# Patient Record
Sex: FEMALE | Race: WHITE | NOT HISPANIC OR LATINO | Employment: FULL TIME | URBAN - NONMETROPOLITAN AREA
[De-identification: names, ages, dates, MRNs, and addresses within clinical notes are randomized per-mention and may not be internally consistent; named-entity substitution may affect disease eponyms.]

---

## 2021-07-19 ENCOUNTER — OFFICE VISIT (OUTPATIENT)
Dept: URGENT CARE | Facility: PHYSICIAN GROUP | Age: 61
End: 2021-07-19
Payer: COMMERCIAL

## 2021-07-19 VITALS
HEIGHT: 63 IN | DIASTOLIC BLOOD PRESSURE: 94 MMHG | BODY MASS INDEX: 27.46 KG/M2 | HEART RATE: 86 BPM | OXYGEN SATURATION: 99 % | WEIGHT: 155 LBS | RESPIRATION RATE: 14 BRPM | SYSTOLIC BLOOD PRESSURE: 136 MMHG | TEMPERATURE: 98.5 F

## 2021-07-19 DIAGNOSIS — E03.9 HYPOTHYROIDISM, UNSPECIFIED TYPE: ICD-10-CM

## 2021-07-19 DIAGNOSIS — R53.83 FATIGUE, UNSPECIFIED TYPE: ICD-10-CM

## 2021-07-19 PROCEDURE — 99204 OFFICE O/P NEW MOD 45 MIN: CPT | Performed by: PHYSICIAN ASSISTANT

## 2021-07-19 RX ORDER — LEVOTHYROXINE SODIUM 112 UG/1
112 TABLET ORAL
COMMUNITY
End: 2021-07-19 | Stop reason: SDUPTHER

## 2021-07-19 RX ORDER — PREDNISONE 10 MG/1
TABLET ORAL
Qty: 15 TABLET | Refills: 0 | Status: SHIPPED | OUTPATIENT
Start: 2021-07-19 | End: 2021-07-24

## 2021-07-19 RX ORDER — LEVOTHYROXINE SODIUM 112 UG/1
112 TABLET ORAL
Qty: 90 TABLET | Refills: 0 | Status: SHIPPED | OUTPATIENT
Start: 2021-07-19 | End: 2021-08-02 | Stop reason: SDUPTHER

## 2021-07-19 NOTE — PROGRESS NOTES
Chief Complaint   Patient presents with   • Rash     itchy, has tried at home alternatives with no luck, arms   • Medication Refill     synthroid, pt is taking a hormonal pill but cannot remember the name       HISTORY OF PRESENT ILLNESS: Patient is a 60 y.o. female who presents today for the following:    Patient reports a rash on both arms for starting few days ago  It started immediately after pulling weeds  She has a rash on the chest and lower legs as well but not as bad as 8 hours  She complains of itching, denying pain  Over-the-counter medication has not helped    She needs a refill of her thyroid medicine  She has not had labs done since [facility]  Her primary care provider is hard to get into    There are no problems to display for this patient.      Allergies:Codeine and Pcn [penicillins]    Current Outpatient Medications Ordered in Epic   Medication Sig Dispense Refill   • predniSONE (DELTASONE) 10 MG Tab 50 mg x 1 day; 40 mg x 1 day; 30 mg x 1 day; 20 mg x 1 day; 10 mg x 1 day 15 tablet 0   • levothyroxine (SYNTHROID) 112 MCG Tab Take 1 tablet by mouth every morning on an empty stomach. 90 tablet 0     No current Epic-ordered facility-administered medications on file.       History reviewed. No pertinent past medical history.    Social History     Tobacco Use   • Smoking status: Current Every Day Smoker   • Smokeless tobacco: Never Used   Substance Use Topics   • Alcohol use: Never   • Drug use: Never       No family status information on file.   History reviewed. No pertinent family history.    Review of Systems:    Constitutional ROS: No unexpected change in weight, No weakness, No fatigue  Pulmonary ROS: No chronic cough, sputum, or hemoptysis, No dyspnea on exertion, No wheezing  Cardiovascular ROS: No diaphoresis, No edema, No palpitations  Hematologic/Lymphatic ROS: No chills, No night sweats, No weight loss  Skin/Integumentary ROS: Positive for rash    Exam:  /94   Pulse 86   Temp 36.9 °C  "(98.5 °F)   Resp 14   Ht 1.6 m (5' 3\")   Wt 70.3 kg (155 lb)   SpO2 99%   General: Well developed, well nourished. No distress.    HENT: Head is grossly normal.  Pulmonary: Unlabored respiratory effort.   Neurologic: Grossly nonfocal. No facial asymmetry noted.  Skin: Scattered, raised erythematous lesions noted on both arms with excoriation marks diffusely.  Similar rash is noted on the chest on the lower legs.  Psych: Normal mood. Alert and oriented to person, place and time.    Assessment/Plan:  Use all medication as directed.  Patient to have labs drawn and will establish with primary care here in [place]. Encouraged patient to sign up for [facility]. Sign up information was sent via text message.  1. Hypothyroidism, unspecified type  levothyroxine (SYNTHROID) 112 MCG Tab    TSH WITH REFLEX TO FT4    ANTITHYROGLOBULIN AB    THYROID PEROX AB TPO (REFLEX)   2. Fatigue, unspecified type  predniSONE (DELTASONE) 10 MG Tab    CBC WITH DIFFERENTIAL    Comp Metabolic Panel    Lipid Profile    VITAMIN D,25 HYDROXY       "

## 2021-07-24 ENCOUNTER — HOSPITAL ENCOUNTER (OUTPATIENT)
Dept: LAB | Facility: MEDICAL CENTER | Age: 61
End: 2021-07-24
Attending: PHYSICIAN ASSISTANT
Payer: COMMERCIAL

## 2021-07-24 ENCOUNTER — OFFICE VISIT (OUTPATIENT)
Dept: URGENT CARE | Facility: PHYSICIAN GROUP | Age: 61
End: 2021-07-24
Payer: COMMERCIAL

## 2021-07-24 VITALS
RESPIRATION RATE: 16 BRPM | HEART RATE: 64 BPM | SYSTOLIC BLOOD PRESSURE: 116 MMHG | OXYGEN SATURATION: 98 % | HEIGHT: 63 IN | DIASTOLIC BLOOD PRESSURE: 84 MMHG | BODY MASS INDEX: 27.11 KG/M2 | WEIGHT: 153 LBS | TEMPERATURE: 98.4 F

## 2021-07-24 DIAGNOSIS — R53.83 FATIGUE, UNSPECIFIED TYPE: ICD-10-CM

## 2021-07-24 DIAGNOSIS — L23.9 ALLERGIC DERMATITIS: Primary | ICD-10-CM

## 2021-07-24 DIAGNOSIS — E03.9 HYPOTHYROIDISM, UNSPECIFIED TYPE: ICD-10-CM

## 2021-07-24 LAB
25(OH)D3 SERPL-MCNC: 17 NG/ML (ref 30–100)
ALBUMIN SERPL BCP-MCNC: 4.3 G/DL (ref 3.2–4.9)
ALBUMIN/GLOB SERPL: 1.6 G/DL
ALP SERPL-CCNC: 68 U/L (ref 30–99)
ALT SERPL-CCNC: 16 U/L (ref 2–50)
ANION GAP SERPL CALC-SCNC: 12 MMOL/L (ref 7–16)
AST SERPL-CCNC: 12 U/L (ref 12–45)
BASOPHILS # BLD AUTO: 0.9 % (ref 0–1.8)
BASOPHILS # BLD: 0.13 K/UL (ref 0–0.12)
BILIRUB SERPL-MCNC: 0.4 MG/DL (ref 0.1–1.5)
BUN SERPL-MCNC: 14 MG/DL (ref 8–22)
CALCIUM SERPL-MCNC: 9 MG/DL (ref 8.5–10.5)
CHLORIDE SERPL-SCNC: 104 MMOL/L (ref 96–112)
CHOLEST SERPL-MCNC: 180 MG/DL (ref 100–199)
CO2 SERPL-SCNC: 24 MMOL/L (ref 20–33)
CREAT SERPL-MCNC: 0.92 MG/DL (ref 0.5–1.4)
EOSINOPHIL # BLD AUTO: 0.87 K/UL (ref 0–0.51)
EOSINOPHIL NFR BLD: 5.9 % (ref 0–6.9)
ERYTHROCYTE [DISTWIDTH] IN BLOOD BY AUTOMATED COUNT: 47.6 FL (ref 35.9–50)
FASTING STATUS PATIENT QL REPORTED: NORMAL
GLOBULIN SER CALC-MCNC: 2.7 G/DL (ref 1.9–3.5)
GLUCOSE SERPL-MCNC: 83 MG/DL (ref 65–99)
HCT VFR BLD AUTO: 48.1 % (ref 37–47)
HDLC SERPL-MCNC: 76 MG/DL
HGB BLD-MCNC: 15.5 G/DL (ref 12–16)
IMM GRANULOCYTES # BLD AUTO: 0.05 K/UL (ref 0–0.11)
IMM GRANULOCYTES NFR BLD AUTO: 0.3 % (ref 0–0.9)
LDLC SERPL CALC-MCNC: 85 MG/DL
LYMPHOCYTES # BLD AUTO: 3.68 K/UL (ref 1–4.8)
LYMPHOCYTES NFR BLD: 25 % (ref 22–41)
MCH RBC QN AUTO: 31.9 PG (ref 27–33)
MCHC RBC AUTO-ENTMCNC: 32.2 G/DL (ref 33.6–35)
MCV RBC AUTO: 99 FL (ref 81.4–97.8)
MONOCYTES # BLD AUTO: 0.82 K/UL (ref 0–0.85)
MONOCYTES NFR BLD AUTO: 5.6 % (ref 0–13.4)
NEUTROPHILS # BLD AUTO: 9.15 K/UL (ref 2–7.15)
NEUTROPHILS NFR BLD: 62.3 % (ref 44–72)
NRBC # BLD AUTO: 0 K/UL
NRBC BLD-RTO: 0 /100 WBC
PLATELET # BLD AUTO: 309 K/UL (ref 164–446)
PMV BLD AUTO: 10.7 FL (ref 9–12.9)
POTASSIUM SERPL-SCNC: 4.1 MMOL/L (ref 3.6–5.5)
PROT SERPL-MCNC: 7 G/DL (ref 6–8.2)
RBC # BLD AUTO: 4.86 M/UL (ref 4.2–5.4)
SODIUM SERPL-SCNC: 140 MMOL/L (ref 135–145)
TRIGL SERPL-MCNC: 96 MG/DL (ref 0–149)
TSH SERPL DL<=0.005 MIU/L-ACNC: 0.63 UIU/ML (ref 0.38–5.33)
WBC # BLD AUTO: 14.7 K/UL (ref 4.8–10.8)

## 2021-07-24 PROCEDURE — 85025 COMPLETE CBC W/AUTO DIFF WBC: CPT

## 2021-07-24 PROCEDURE — 80053 COMPREHEN METABOLIC PANEL: CPT

## 2021-07-24 PROCEDURE — 80061 LIPID PANEL: CPT

## 2021-07-24 PROCEDURE — 82306 VITAMIN D 25 HYDROXY: CPT

## 2021-07-24 PROCEDURE — 36415 COLL VENOUS BLD VENIPUNCTURE: CPT

## 2021-07-24 PROCEDURE — 99214 OFFICE O/P EST MOD 30 MIN: CPT | Performed by: PHYSICIAN ASSISTANT

## 2021-07-24 PROCEDURE — 84443 ASSAY THYROID STIM HORMONE: CPT

## 2021-07-24 PROCEDURE — 86800 THYROGLOBULIN ANTIBODY: CPT

## 2021-07-24 PROCEDURE — 86376 MICROSOMAL ANTIBODY EACH: CPT

## 2021-07-24 RX ORDER — ESTRADIOL AND NORETHINDRONE ACETATE 1; .5 MG/1; MG/1
1 TABLET ORAL
COMMUNITY
Start: 2021-06-25 | End: 2022-11-21 | Stop reason: SDUPTHER

## 2021-07-24 RX ORDER — CLOBETASOL PROPIONATE 0.5 MG/G
1 CREAM TOPICAL 2 TIMES DAILY
Qty: 60 G | Refills: 0 | Status: SHIPPED | OUTPATIENT
Start: 2021-07-24 | End: 2021-08-03

## 2021-07-24 NOTE — PROGRESS NOTES
"Subjective:   Diane Aguilar is a 60 y.o. female who presents for Itching (x1week, had an allergic reaction )        HPI   The patient was initially seen on 7/19/2021 treated for allergic dermatitis with a 5-day prednisone taper.  She completed full course of medication without improvement.  Symptoms began shortly after pulling weeds in her garden.  There was no new food, products, medications.  She has tried over-the-counter Benadryl cream, cortisone 10 without relief.  She continues to have redness, itching and swelling.      PMH:  has no past medical history on file.  MEDS:   Current Outpatient Medications:   •  estradiol-norethindrone (ACTIVELLA) 1-0.5 MG per tablet, Take 1 tablet by mouth every day., Disp: , Rfl:   •  clobetasol (TEMOVATE) 0.05 % Cream, Apply 1 Application topically 2 times a day for 10 days., Disp: 60 g, Rfl: 0  •  levothyroxine (SYNTHROID) 112 MCG Tab, Take 1 tablet by mouth every morning on an empty stomach., Disp: 90 tablet, Rfl: 0  ALLERGIES:   Allergies   Allergen Reactions   • Codeine    • Pcn [Penicillins]      SURGHX: No past surgical history on file.  SOCHX:  reports that she has been smoking. She has never used smokeless tobacco. She reports that she does not drink alcohol and does not use drugs.  No family history on file.     Objective:   /84   Pulse 64   Temp 36.9 °C (98.4 °F) (Temporal)   Resp 16   Ht 1.6 m (5' 3\")   Wt 69.4 kg (153 lb)   SpO2 98%   BMI 27.10 kg/m²     Physical Exam  Vitals reviewed.   Constitutional:       General: She is not in acute distress.     Appearance: She is well-developed.   HENT:      Head: Normocephalic and atraumatic.   Neck:      Trachea: No tracheal deviation.   Skin:     General: Skin is warm and dry.      Capillary Refill: Capillary refill takes less than 2 seconds.          Neurological:      Mental Status: She is alert and oriented to person, place, and time.   Psychiatric:         Mood and Affect: Mood normal.         Behavior: " Behavior normal.           Assessment/Plan:     1. Allergic dermatitis  clobetasol (TEMOVATE) 0.05 % Cream     Advised patient to discontinue all suspicious products including ingredients with fragrance, dyes.  Patient can apply Aquaphor for moisturizing. We discussed appropriate use and AEs of prolonged use of steroid.     If symptoms persist she can contact me for referral to allergy.    If symptoms worsen or persist patient can return to clinic for reevaluation. Patient confirmed understanding of information.    Please note that this dictation was created using voice recognition software. I have made every reasonable attempt to correct obvious errors, but I expect that there are errors of grammar and possibly content that I did not discover before finalizing the note.

## 2021-07-26 LAB
THYROGLOB AB SERPL-ACNC: 286.8 IU/ML (ref 0–4)
THYROPEROXIDASE AB SERPL-ACNC: 51.2 IU/ML (ref 0–9)

## 2021-08-02 ENCOUNTER — OFFICE VISIT (OUTPATIENT)
Dept: MEDICAL GROUP | Facility: PHYSICIAN GROUP | Age: 61
End: 2021-08-02
Payer: COMMERCIAL

## 2021-08-02 VITALS
HEART RATE: 85 BPM | OXYGEN SATURATION: 97 % | WEIGHT: 155.4 LBS | TEMPERATURE: 98.6 F | DIASTOLIC BLOOD PRESSURE: 84 MMHG | SYSTOLIC BLOOD PRESSURE: 136 MMHG | BODY MASS INDEX: 27.54 KG/M2 | HEIGHT: 63 IN

## 2021-08-02 DIAGNOSIS — Z76.89 ENCOUNTER TO ESTABLISH CARE: ICD-10-CM

## 2021-08-02 DIAGNOSIS — E03.9 HYPOTHYROIDISM, UNSPECIFIED TYPE: ICD-10-CM

## 2021-08-02 DIAGNOSIS — Z78.0 POSTMENOPAUSAL: ICD-10-CM

## 2021-08-02 DIAGNOSIS — F17.210 TOBACCO DEPENDENCE DUE TO CIGARETTES: ICD-10-CM

## 2021-08-02 DIAGNOSIS — E06.3 HASHIMOTO'S THYROIDITIS: ICD-10-CM

## 2021-08-02 DIAGNOSIS — E55.9 VITAMIN D DEFICIENCY: ICD-10-CM

## 2021-08-02 PROCEDURE — 99214 OFFICE O/P EST MOD 30 MIN: CPT | Mod: 25 | Performed by: NURSE PRACTITIONER

## 2021-08-02 PROCEDURE — 99406 BEHAV CHNG SMOKING 3-10 MIN: CPT | Performed by: NURSE PRACTITIONER

## 2021-08-02 RX ORDER — LEVOTHYROXINE SODIUM 112 UG/1
112 TABLET ORAL
Qty: 90 TABLET | Refills: 3 | Status: SHIPPED | OUTPATIENT
Start: 2021-08-02 | End: 2022-10-19 | Stop reason: SDUPTHER

## 2021-08-02 ASSESSMENT — FIBROSIS 4 INDEX: FIB4 SCORE: 0.57

## 2021-08-02 ASSESSMENT — PATIENT HEALTH QUESTIONNAIRE - PHQ9: CLINICAL INTERPRETATION OF PHQ2 SCORE: 0

## 2021-08-02 NOTE — ASSESSMENT & PLAN NOTE
Labwork reviewed and up to date. Taking medicine as directed. Levothyroxine 112 mcg daily.   Denies palpitations, skin changes, temperature intolerance, changes in bowel habits.

## 2021-08-02 NOTE — PROGRESS NOTES
Chief Complaint   Patient presents with   • Establish Care       Subjective:     HPI:   Diane Aguilar is a 60 y.o. female here to discuss the evaluation and management of:        Hashimoto's thyroiditis  Labwork reviewed and up to date. Taking medicine as directed. Levothyroxine 112 mcg daily.   Denies palpitations, skin changes, temperature intolerance, changes in bowel habits.      Postmenopausal  Patient reports that she is postmenopausal.  She is currently taking estradiol-norethindrone daily to help manage symptoms.  She reports that she is up-to-date on her mammogram-we will be requesting records.    Discussed with patient that she must have annual mammogram screenings as Estradiol-normethadone can place her at higher risk for breast cancer.  Patient also does smoke cigarettes.     Vitamin D deficiency  This is a new condition.  Discussed needing daily vitamin D supplement with patient.  We will continue to monitor future appointments.          ROS:  Gen: no fevers/chills, no changes in weight  Eyes: no changes in vision  ENT: no sore throat, no hearing loss, no bloody nose  Pulm: no sob, no cough  CV: no chest pain, no palpitations  GI: no nausea/vomiting, no diarrhea  : no dysuria  MSk: no myalgias  Skin: no rash  Neuro: no headaches, no numbness/tingling      Allergies   Allergen Reactions   • Codeine    • Pcn [Penicillins]        Current medicines (including changes today)  Current Outpatient Medications   Medication Sig Dispense Refill   • levothyroxine (SYNTHROID) 112 MCG Tab Take 1 tablet by mouth every morning on an empty stomach. 90 tablet 3   • estradiol-norethindrone (ACTIVELLA) 1-0.5 MG per tablet Take 1 tablet by mouth every day.     • clobetasol (TEMOVATE) 0.05 % Cream Apply 1 Application topically 2 times a day for 10 days. 60 g 0     No current facility-administered medications for this visit.       Social History     Tobacco Use   • Smoking status: Current Every Day Smoker     Packs/day: 1.00      Years: 23.00     Pack years: 23.00     Types: Cigarettes   • Smokeless tobacco: Never Used   Vaping Use   • Vaping Use: Never assessed   Substance Use Topics   • Alcohol use: Yes     Comment: occ   • Drug use: Never       Patient Active Problem List    Diagnosis Date Noted   • Hashimoto's thyroiditis 08/02/2021   • Vitamin D deficiency 08/02/2021   • Postmenopausal 08/02/2021       Family History   Problem Relation Age of Onset   • Other Father         ALS   • Lung Disease Paternal Grandmother           Objective:     Hospital Outpatient Visit on 07/24/2021   Component Date Value Ref Range Status   • Microsomal -Tpo- Abs 07/24/2021 51.2* 0.0 - 9.0 IU/mL Final    Comment: Performed By: [facility]  [address] [place] [address]  : Susan Brown MD     • Anti-Thyroglobulin 07/24/2021 286.8* 0.0 - 4.0 IU/mL Final    Comment: INTERPRETIVE INFORMATION: Thyroglobulin Antibody    A value of 4.0 IU/mL or less indicates a negative result for  thyroglobulin antibodies.  The Thyroglobulin Antibody assay is being performed using the  [employer] DxI method.  Performed By: [facility]  [address] [id number]  : Susan Brown MD     • TSH 07/24/2021 0.630  0.380 - 5.330 uIU/mL Final    Comment: Please note new reference ranges effective 12/14/2017 10:00 AM    Pregnant Females, 1st Trimester  0.050-3.700  Pregnant Females, 2nd Trimester  0.310-4.350  Pregnant Females, 3rd Trimester  0.410-5.180     • 25-Hydroxy   Vitamin D 25 07/24/2021 17* 30 - 100 ng/mL Final    Comment: Adult Ranges:   <20 ng/mL - Deficiency  20-29 ng/mL - Insufficiency   ng/mL - Sufficiency  Effective 3/18/2020, this electrochemiluminescence binding assay is  performed using Roche leonides e immunoassay analyzer.  The [employer] Vitamin D  total II assay is intended for the quantitative determination of total 25  hydroxyvitamin D in human serum and plasma.  This assay is to be used as an  aid in the assessment of vitamin D sufficiency in adults.     • Cholesterol,Tot 07/24/2021 180  100 - 199 mg/dL Final   • Triglycerides 07/24/2021 96  0 - 149 mg/dL Final   • HDL 07/24/2021 76  >=40 mg/dL Final   • LDL 07/24/2021 85  <100 mg/dL Final   • Sodium 07/24/2021 140  135 - 145 mmol/L Final   • Potassium 07/24/2021 4.1  3.6 - 5.5 mmol/L Final   • Chloride 07/24/2021 104  96 - 112 mmol/L Final   • Co2 07/24/2021 24  20 - 33 mmol/L Final   • Anion Gap 07/24/2021 12.0  7.0 - 16.0 Final   • Glucose 07/24/2021 83  65 - 99 mg/dL Final   • Bun 07/24/2021 14  8 - 22 mg/dL Final   • Creatinine 07/24/2021 0.92  0.50 - 1.40 mg/dL Final   • Calcium 07/24/2021 9.0  8.5 - 10.5 mg/dL Final   • AST(SGOT) 07/24/2021 12  12 - 45 U/L Final   • ALT(SGPT) 07/24/2021 16  2 - 50 U/L Final   • Alkaline Phosphatase 07/24/2021 68  30 - 99 U/L Final   • Total Bilirubin 07/24/2021 0.4  0.1 - 1.5 mg/dL Final   • Albumin 07/24/2021 4.3  3.2 - 4.9 g/dL Final   • Total Protein 07/24/2021 7.0  6.0 - 8.2 g/dL Final   • Globulin 07/24/2021 2.7  1.9 - 3.5 g/dL Final   • A-G Ratio 07/24/2021 1.6  g/dL Final   • WBC 07/24/2021 14.7* 4.8 - 10.8 K/uL Final   • RBC 07/24/2021 4.86  4.20 - 5.40 M/uL Final   • Hemoglobin 07/24/2021 15.5  12.0 - 16.0 g/dL Final   • Hematocrit 07/24/2021 48.1* 37.0 - 47.0 % Final   • MCV 07/24/2021 99.0* 81.4 - 97.8 fL Final   • MCH 07/24/2021 31.9  27.0 - 33.0 pg Final   • MCHC 07/24/2021 32.2* 33.6 - 35.0 g/dL Final   • RDW 07/24/2021 47.6  35.9 - 50.0 fL Final   • Platelet Count 07/24/2021 309  164 - 446 K/uL Final   • MPV 07/24/2021 10.7  9.0 - 12.9 fL Final   • Neutrophils-Polys 07/24/2021 62.30  44.00 - 72.00 % Final   • Lymphocytes 07/24/2021 25.00  22.00 - 41.00 % Final   • Monocytes 07/24/2021 5.60  0.00 - 13.40 % Final   • Eosinophils 07/24/2021 5.90  0.00 - 6.90 % Final   • Basophils 07/24/2021 0.90  0.00 - 1.80 % Final   • Immature Granulocytes 07/24/2021 0.30  0.00 - 0.90 %  "Final   • Nucleated RBC 07/24/2021 0.00  /100 WBC Final   • Neutrophils (Absolute) 07/24/2021 9.15* 2.00 - 7.15 K/uL Final    Includes immature neutrophils, if present.   • Lymphs (Absolute) 07/24/2021 3.68  1.00 - 4.80 K/uL Final   • Monos (Absolute) 07/24/2021 0.82  0.00 - 0.85 K/uL Final   • Eos (Absolute) 07/24/2021 0.87* 0.00 - 0.51 K/uL Final   • Baso (Absolute) 07/24/2021 0.13* 0.00 - 0.12 K/uL Final   • Immature Granulocytes (abs) 07/24/2021 0.05  0.00 - 0.11 K/uL Final   • NRBC (Absolute) 07/24/2021 0.00  K/uL Final   • Fasting Status 07/24/2021 Fasting   Final   • GFR If  07/24/2021 >60  >60 mL/min/1.73 m 2 Final   • GFR If Non  07/24/2021 >60  >60 mL/min/1.73 m 2 Final       /84   Pulse 85   Temp 37 °C (98.6 °F) (Temporal)   Ht 1.6 m (5' 3\")   Wt 70.5 kg (155 lb 6.4 oz)   SpO2 97%  Body mass index is 27.53 kg/m².    Physical Exam:  Constitutional: Well-developed and well-nourished female in NAD. Not diaphoretic. No distress.   Skin: warm, dry, intact, no evidence of rash or concerning lesions  Head: Atraumatic without lesions.  Eyes: Conjunctivae and extraocular motions are normal. Pupils are equal, round, and reactive to light. No scleral icterus.   Ears:  External ears unremarkable. TMs normal; bilaterally  Mouth/Throat: Tongue normal. Oropharynx is clear and moist. Posterior pharynx without erythema or exudates.  Neck: Supple, trachea midline. No thyromegaly present. No cervical or supraclavicular lymphadenopathy.  Cardiovascular: Regular rate and rhythm without murmur. Radial pulses are intact and equal bilaterally.  Pulmonary: Clear to ausculation. Normal effort. No rales, ronchi, or wheezing.  Extremities: No cyanosis, clubbing, erythema, nor edema.   Neurological: Alert and oriented x 3. No cranial nerve deficit. 5/5 myotomes. Sensation intact.   Psychiatric:  Behavior, mood, and affect are appropriate.       Assessment and Plan:     The following " treatment plan was discussed:    1. Encounter to establish care  This is a very pleasant 60-year-old female presents today to establish care.  I have reviewed and updated her medical history, family history, surgical history, allergies, medications, and social determinants of health.       2. Hashimoto's thyroiditis  Chronic and stable condition.  Refill of levothyroxine 112 mcg daily was sent to pharmacy.  Patient is tolerating this medication well without any side effects.  Recheck labs in approximate 4 months.  - TSH WITH REFLEX TO FT4; Future    3. Hypothyroidism, unspecified type  - levothyroxine (SYNTHROID) 112 MCG Tab; Take 1 tablet by mouth every morning on an empty stomach.  Dispense: 90 tablet; Refill: 3    4. Vitamin D deficiency  Patient will start taking vitamin D supplement daily as well as 1200 mg calcium.  - VITAMIN D,25 HYDROXY; Future    5. Postmenopausal  Patient is currently taking estradiol/norethindrone and reports that she is tolerating it well.  She reports she is also up-to-date on her mammograms.  Patient also continues to smoke cigarettes.    6.  Chronic tobacco dependency due to cigarettes  Patient was counseled for approximately 4 minutes in regards to smoking sensation as well as risks of smoking cigarettes.  She reports states she does not want to quit smoking cigarettes.  She is aware of these risks and continues to smoke.  We will continue to  at future appointments.    Any change or worsening of signs or symptoms, patient encouraged to follow-up or report to emergency room for further evaluation. Patient verbalizes understanding and agrees.    Follow-Up: Return in about 4 months (around 12/2/2021) for Follow up labs thyroid .      PLEASE NOTE: This dictation was created using voice recognition software. I have made every reasonable attempt to correct obvious errors, but I expect that there are errors of grammar and possibly content that I did not discover before finalizing  the note.

## 2021-08-02 NOTE — ASSESSMENT & PLAN NOTE
Patient does continue to want to smoke cigarettes and reports that she does not want to quit today.  She does understand the risks of continuing to smoke cigarettes.    The 10-year ASCVD risk score (Kajal DAVID Jr., et al., 2013) is: 5.2%    Values used to calculate the score:      Age: 60 years      Sex: Female      Is Non- : No      Diabetic: No      Tobacco smoker: Yes      Systolic Blood Pressure: 136 mmHg      Is BP treated: No      HDL Cholesterol: 76 mg/dL      Total Cholesterol: 180 mg/dL

## 2021-08-02 NOTE — ASSESSMENT & PLAN NOTE
This is a new condition.  Discussed needing daily vitamin D supplement with patient.  We will continue to monitor future appointments.

## 2021-08-02 NOTE — PATIENT INSTRUCTIONS
"· Vit D 2,000 IU daily and 1,200mg Walter daily.      Smoking Cessation, Tips for Success  If you are ready to quit smoking, congratulations! You have chosen to help yourself be healthier. Cigarettes bring nicotine, tar, carbon monoxide, and other irritants into your body. Your lungs, heart, and blood vessels will be able to work better without these poisons. There are many different ways to quit smoking. Nicotine gum, nicotine patches, a nicotine inhaler, or nicotine nasal spray can help with physical craving. Hypnosis, support groups, and medicines help break the habit of smoking.  WHAT THINGS CAN I DO TO MAKE QUITTING EASIER?   Here are some tips to help you quit for good:  · Pick a date when you will quit smoking completely. Tell all of your friends and family about your plan to quit on that date.  · Do not try to slowly cut down on the number of cigarettes you are smoking. Pick a quit date and quit smoking completely starting on that day.  · Throw away all cigarettes.    · Clean and remove all ashtrays from your home, work, and car.  · On a card, write down your reasons for quitting. Carry the card with you and read it when you get the urge to smoke.  · Cleanse your body of nicotine. Drink enough water and fluids to keep your urine clear or pale yellow. Do this after quitting to flush the nicotine from your body.  · Learn to predict your moods. Do not let a bad situation be your excuse to have a cigarette. Some situations in your life might tempt you into wanting a cigarette.  · Never have \"just one\" cigarette. It leads to wanting another and another. Remind yourself of your decision to quit.  · Change habits associated with smoking. If you smoked while driving or when feeling stressed, try other activities to replace smoking. Stand up when drinking your coffee. Brush your teeth after eating. Sit in a different chair when you read the paper. Avoid alcohol while trying to quit, and try to drink fewer caffeinated " "beverages. Alcohol and caffeine may urge you to smoke.  · Avoid foods and drinks that can trigger a desire to smoke, such as sugary or spicy foods and alcohol.  · Ask people who smoke not to smoke around you.  · Have something planned to do right after eating or having a cup of coffee. For example, plan to take a walk or exercise.  · Try a relaxation exercise to calm you down and decrease your stress. Remember, you may be tense and nervous for the first 2 weeks after you quit, but this will pass.  · Find new activities to keep your hands busy. Play with a pen, coin, or rubber band. Doodle or draw things on paper.  · Brush your teeth right after eating. This will help cut down on the craving for the taste of tobacco after meals. You can also try mouthwash.    · Use oral substitutes in place of cigarettes. Try using lemon drops, carrots, cinnamon sticks, or chewing gum. Keep them handy so they are available when you have the urge to smoke.  · When you have the urge to smoke, try deep breathing.  · Designate your home as a nonsmoking area.  · If you are a heavy smoker, ask your health care provider about a prescription for nicotine chewing gum. It can ease your withdrawal from nicotine.  · Reward yourself. Set aside the cigarette money you save and buy yourself something nice.  · Look for support from others. Join a support group or smoking cessation program. Ask someone at home or at work to help you with your plan to quit smoking.  · Always ask yourself, \"Do I need this cigarette or is this just a reflex?\" Tell yourself, \"Today, I choose not to smoke,\" or \"I do not want to smoke.\" You are reminding yourself of your decision to quit.  · Do not replace cigarette smoking with electronic cigarettes (commonly called e-cigarettes). The safety of e-cigarettes is unknown, and some may contain harmful chemicals.  · If you relapse, do not give up! Plan ahead and think about what you will do the next time you get the urge to " smoke.  HOW WILL I FEEL WHEN I QUIT SMOKING?  You may have symptoms of withdrawal because your body is used to nicotine (the addictive substance in cigarettes). You may crave cigarettes, be irritable, feel very hungry, cough often, get headaches, or have difficulty concentrating. The withdrawal symptoms are only temporary. They are strongest when you first quit but will go away within 10-14 days. When withdrawal symptoms occur, stay in control. Think about your reasons for quitting. Remind yourself that these are signs that your body is healing and getting used to being without cigarettes. Remember that withdrawal symptoms are easier to treat than the major diseases that smoking can cause.   Even after the withdrawal is over, expect periodic urges to smoke. However, these cravings are generally short lived and will go away whether you smoke or not. Do not smoke!  WHAT RESOURCES ARE AVAILABLE TO HELP ME QUIT SMOKING?  Your health care provider can direct you to community resources or hospitals for support, which may include:  · Group support.  · Education.  · Hypnosis.  · Therapy.     This information is not intended to replace advice given to you by your health care provider. Make sure you discuss any questions you have with your health care provider.     Document Released: 09/15/2005 Document Revised: 01/08/2016 Document Reviewed: 06/05/2014  [employer] Interactive Patient Education ©2016 [employer]      MyPlate from [employer]    MyPlate is an outline of a general healthy diet based on the 2010 Dietary Guidelines for Americans, from the U.S. Department of Agriculture (USDA). It sets guidelines for how much food you should eat from each food group based on your age, sex, and level of physical activity.  What are tips for following MyPlate?  To follow MyPlate recommendations:  · Eat a wide variety of fruits and vegetables, grains, and protein foods.  · Serve smaller portions and eat less food throughout the day.  · Limit  portion sizes to avoid overeating.  · Enjoy your food.  · Get at least 150 minutes of exercise every week. This is about 30 minutes each day, 5 or more days per week.  It can be difficult to have every meal look like MyPlate. Think about MyPlate as eating guidelines for an entire day, rather than each individual meal.  Fruits and vegetables  · Make half of your plate fruits and vegetables.  · Eat many different colors of fruits and vegetables each day.  · For a 2,000 calorie daily food plan, eat:  ? 2½ cups of vegetables every day.  ? 2 cups of fruit every day.  · 1 cup is equal to:  ? 1 cup raw or cooked vegetables.  ? 1 cup raw fruit.  ? 1 medium-sized orange, apple, or banana.  ? 1 cup 100% fruit or vegetable juice.  ? 2 cups raw leafy greens, such as lettuce, spinach, or kale.  ? ½ cup dried fruit.  Grains  · One fourth of your plate should be grains.  · Make at least half of the grains you eat each day whole grains.  · For a 2,000 calorie daily food plan, eat 6 oz of grains every day.  · 1 oz is equal to:  ? 1 slice bread.  ? 1 cup cereal.  ? ½ cup cooked rice, cereal, or pasta.  Protein  · One fourth of your plate should be protein.  · Eat a wide variety of protein foods, including meat, poultry, fish, eggs, beans, nuts, and tofu.  · For a 2,000 calorie daily food plan, eat 5½ oz of protein every day.  · 1 oz is equal to:  ? 1 oz meat, poultry, or fish.  ? ¼ cup cooked beans.  ? 1 egg.  ? ½ oz nuts or seeds.  ? 1 Tbsp peanut butter.  Dairy  · Drink fat-free or low-fat (1%) milk.  · Eat or drink dairy as a side to meals.  · For a 2,000 calorie daily food plan, eat or drink 3 cups of dairy every day.  · 1 cup is equal to:  ? 1 cup milk, yogurt, cottage cheese, or soy milk (soy beverage).  ? 2 oz processed cheese.  ? 1½ oz natural cheese.  Fats, oils, salt, and sugars  · Only small amounts of oils are recommended.  · Avoid foods that are high in calories and low in nutritional value (empty calories), like foods  high in fat or added sugars.  · Choose foods that are low in salt (sodium). Choose foods that have less than 140 milligrams (mg) of sodium per serving.  · Drink water instead of sugary drinks. Drink enough water each day to keep your urine pale yellow.  Where to find support  · Work with your health care provider or a nutrition specialist (dietitian) to develop a customized eating plan that is right for you.  · Download an elizabet (mobile application) to help you track your daily food intake.  Where to find more information  · Go to ChooseMyPlate.gov for more information.  · Learn more and log your daily food intake according to MyPlate using [employer]: www.MyTime.[employer].gov  Summary  · MyPlate is a general guideline for healthy eating from the USDA. It is based on the 2010 Dietary Guidelines for Americans.  · In general, fruits and vegetables should take up ½ of your plate, grains should take up ¼ of your plate, and protein should take up ¼ of your plate.  This information is not intended to replace advice given to you by your health care provider. Make sure you discuss any questions you have with your health care provider.  Document Released: 01/06/2009 Document Revised: 01/19/2019 Document Reviewed: 03/19/2018  Elsevier Patient Education © 2020 [employer] Inc.

## 2021-08-02 NOTE — ASSESSMENT & PLAN NOTE
Patient reports that she is postmenopausal.  She is currently taking estradiol-norethindrone daily to help manage symptoms.  She reports that she is up-to-date on her mammogram-we will be requesting records.    Discussed with patient that she must have annual mammogram screenings as Estradiol-normethadone can place her at higher risk for breast cancer.  Patient also does smoke cigarettes.

## 2021-11-10 ENCOUNTER — OFFICE VISIT (OUTPATIENT)
Dept: URGENT CARE | Facility: PHYSICIAN GROUP | Age: 61
End: 2021-11-10
Payer: COMMERCIAL

## 2021-11-10 VITALS
SYSTOLIC BLOOD PRESSURE: 108 MMHG | HEIGHT: 63 IN | HEART RATE: 80 BPM | DIASTOLIC BLOOD PRESSURE: 64 MMHG | OXYGEN SATURATION: 95 % | RESPIRATION RATE: 16 BRPM | WEIGHT: 159 LBS | BODY MASS INDEX: 28.17 KG/M2 | TEMPERATURE: 97.8 F

## 2021-11-10 DIAGNOSIS — M79.641 HAND PAIN, RIGHT: ICD-10-CM

## 2021-11-10 DIAGNOSIS — M25.532 WRIST PAIN, ACUTE, LEFT: ICD-10-CM

## 2021-11-10 PROCEDURE — 99213 OFFICE O/P EST LOW 20 MIN: CPT | Performed by: PHYSICIAN ASSISTANT

## 2021-11-10 RX ORDER — PREDNISONE 10 MG/1
TABLET ORAL
Qty: 15 TABLET | Refills: 0 | Status: SHIPPED | OUTPATIENT
Start: 2021-11-10 | End: 2021-12-07

## 2021-11-10 RX ORDER — IBUPROFEN 200 MG
200 TABLET ORAL EVERY 6 HOURS PRN
COMMUNITY
End: 2022-03-22

## 2021-11-10 ASSESSMENT — FIBROSIS 4 INDEX: FIB4 SCORE: 0.58

## 2021-11-11 NOTE — PROGRESS NOTES
Chief Complaint   Patient presents with   • Hand Pain     severe pain with movement and swelling-worse in the AM        HISTORY OF PRESENT ILLNESS: Patient is a 61 y.o. female who presents today for the following:    Bilateral hand pain x 1 week  No injury  Right hand swelling along digits 1 and 2  Left wrist pain, dorsal aspect  Ice/ibuprofen seems to help  Denies fever, chills    Patient Active Problem List    Diagnosis Date Noted   • Hashimoto's thyroiditis 2021   • Vitamin D deficiency 2021   • Postmenopausal 2021   • Tobacco dependence due to cigarettes 2021       Allergies:Codeine and Pcn [penicillins]    Current Outpatient Medications Ordered in Epic   Medication Sig Dispense Refill   • ibuprofen (MOTRIN) 200 MG Tab Take 200 mg by mouth every 6 hours as needed.     • VITAMIN D PO Take  by mouth.     • Calcium Carbonate (CALCIUM 500 PO) Take  by mouth.     • predniSONE (DELTASONE) 10 MG Tab 50 mg x 1 day; 40 mg x 1 day; 30 mg x 1 day; 20 mg x 1 day; 10 mg x 1 day 15 Tablet 0   • levothyroxine (SYNTHROID) 112 MCG Tab Take 1 tablet by mouth every morning on an empty stomach. 90 tablet 3   • estradiol-norethindrone (ACTIVELLA) 1-0.5 MG per tablet Take 1 tablet by mouth every day.       No current [facility]-ordered facility-administered medications on file.       Past Medical History:   Diagnosis Date   • Thyroid disease        Social History     Tobacco Use   • Smoking status: Current Every Day Smoker     Packs/day: 1.00     Years: 23.00     Pack years: 23.00     Types: Cigarettes   • Smokeless tobacco: Never Used   Vaping Use   • Vaping Use: Not on file   Substance Use Topics   • Alcohol use: Yes     Comment: occ   • Drug use: Never       Family Status   Relation Name Status   • Mo  Alive   • Fa     • Bro  Alive   • MGMo     • MGFa     • PGMo     • PGFa     • Bro  Alive   • Bro  Alive   • Bro     • Bro       Family History   Problem  "Relation Age of Onset   • Other Father         ALS   • Lung Disease Paternal Grandmother        Review of Systems:    Constitutional ROS: No unexpected change in weight, No weakness, No fatigue  Pulmonary ROS: No chronic cough, sputum, or hemoptysis, No dyspnea on exertion, No wheezing  Cardiovascular ROS: No diaphoresis, No edema, No palpitations  Musculoskeletal/Extremities ROS: bilateral hand pain  Hematologic/Lymphatic ROS: No chills, No night sweats, No weight loss  Skin/Integumentary ROS: No edema, No evidence of rash, No itching    Exam:  /64   Pulse 80   Temp 36.6 °C (97.8 °F)   Resp 16   Ht 1.6 m (5' 3\")   Wt 72.1 kg (159 lb)   SpO2 95%   General: Well developed, well nourished. No distress.    HENT: Head is grossly normal.  Pulmonary: Unlabored respiratory effort.   Neurologic: Grossly nonfocal. No facial asymmetry noted.  Musculoskeletal: Diffuse tenderness is noted on digits 1 and 2 of the right hand with trace edema extending into the thenar aspect.  No erythema or rashes noted in the area of pain.  Tenderness is noted on the dorsal aspect of the left wrist without soft tissue swelling or ecchymosis.  Patient is full range of motion of the left wrist.  Able to make a full fist bilaterally.  Brisk cap refill noted bilaterally.  Skin: Warm, dry, good turgor. No rashes in visible areas.   Psych: Normal mood. Alert and oriented to person, place and time.    Assessment/Plan:  Unknown etiology.  Starting prednisone but have advised follow-up if symptoms do not improve or if they return after stopping steroids.  No signs of infection noted. Follow up for worsening or persistent symptoms.  1. Hand pain, right  predniSONE (DELTASONE) 10 MG Tab   2. Wrist pain, acute, left  predniSONE (DELTASONE) 10 MG Tab       "

## 2021-12-07 ENCOUNTER — OFFICE VISIT (OUTPATIENT)
Dept: MEDICAL GROUP | Facility: PHYSICIAN GROUP | Age: 61
End: 2021-12-07
Payer: COMMERCIAL

## 2021-12-07 VITALS
DIASTOLIC BLOOD PRESSURE: 84 MMHG | SYSTOLIC BLOOD PRESSURE: 132 MMHG | HEIGHT: 63 IN | TEMPERATURE: 97.4 F | BODY MASS INDEX: 28.24 KG/M2 | RESPIRATION RATE: 16 BRPM | WEIGHT: 159.4 LBS | HEART RATE: 89 BPM | OXYGEN SATURATION: 99 %

## 2021-12-07 DIAGNOSIS — R21 RASH: ICD-10-CM

## 2021-12-07 PROCEDURE — 99214 OFFICE O/P EST MOD 30 MIN: CPT | Performed by: NURSE PRACTITIONER

## 2021-12-07 RX ORDER — CETIRIZINE HYDROCHLORIDE 10 MG/1
TABLET ORAL
Qty: 30 TABLET | Refills: 2 | Status: SHIPPED | OUTPATIENT
Start: 2021-12-07 | End: 2023-11-22

## 2021-12-07 ASSESSMENT — FIBROSIS 4 INDEX: FIB4 SCORE: 0.58

## 2021-12-08 NOTE — PROGRESS NOTES
Chief Complaint   Patient presents with   • Itching     since september    • Hand Pain     bilateral hurts more on the right x since august        Subjective:     HPI:   Diane Aguilar is a 61 y.o. female here to discuss the evaluation and management of:      Rash  This is a chronic and ongoing condition.  Patient has been seen off-and-on in the urgent care since July 2021 for allergic dermatitis.  She has tried multiple over-the-counter medications including oral Benadryl, Benadryl cream, and cortisone 10.  She has also been prescribed Clobetasol 0.05% and prednisone which has given her some relief however it is only been short-term.  Approximately a week after the steroid treatments her symptoms come back.  She is recently prescribed on 11- prednisone taper.  She reports that back in July the rash appeared and she thinks it was related to when she was weed eating and pulling weeds in her yard however it has never completely gone away.  She denies anyone else in the family with this rash.  She denies any history of anyone diagnosed with scabies.  She denies any new medications.  She denies any change in detergents or fragrances.      ROS  Gen: no fevers/chills, no changes in weight  Pulm: no sob, no cough  CV: no chest pain, no palpitations  GI: no nausea/vomiting, no diarrhea  MSk: no myalgias  Skin: Positive per HPI  Neuro: no headaches, no numbness/tingling      Allergies   Allergen Reactions   • Codeine    • Pcn [Penicillins]        Current medicines (including changes today)  Current Outpatient Medications   Medication Sig Dispense Refill   • cetirizine (ZYRTEC) 10 MG Tab Take 4 tablets at night for 1 to 2 weeks then 1 tablet nightly for itching 30 Tablet 2   • ibuprofen (MOTRIN) 200 MG Tab Take 200 mg by mouth every 6 hours as needed.     • VITAMIN D PO Take  by mouth.     • Calcium Carbonate (CALCIUM 500 PO) Take  by mouth.     • levothyroxine (SYNTHROID) 112 MCG Tab Take 1 tablet by mouth every morning  "on an empty stomach. 90 tablet 3   • estradiol-norethindrone (ACTIVELLA) 1-0.5 MG per tablet Take 1 tablet by mouth every day.       No current facility-administered medications for this visit.       Social History     Tobacco Use   • Smoking status: Current Every Day Smoker     Packs/day: 1.00     Years: 23.00     Pack years: 23.00     Types: Cigarettes   • Smokeless tobacco: Never Used   Vaping Use   • Vaping Use: Never used   Substance Use Topics   • Alcohol use: Yes     Comment: occ   • Drug use: Never       Patient Active Problem List    Diagnosis Date Noted   • Rash 12/07/2021   • Hashimoto's thyroiditis 08/02/2021   • Vitamin D deficiency 08/02/2021   • Postmenopausal 08/02/2021   • Tobacco dependence due to cigarettes 08/02/2021       Family History   Problem Relation Age of Onset   • Other Father         ALS   • Lung Disease Paternal Grandmother           Objective:     /84 (BP Location: Right arm, Patient Position: Sitting, BP Cuff Size: Adult)   Pulse 89   Temp 36.3 °C (97.4 °F) (Temporal)   Resp 16   Ht 1.6 m (5' 3\")   Wt 72.3 kg (159 lb 6.4 oz)   SpO2 99%  Body mass index is 28.24 kg/m².    Physical Exam:  Constitutional: Well-developed and well-nourished female in NAD. Not diaphoretic. No distress.   Skin: Multiple scattered raised lesions on both upper and lower extremities that are erythematous, pruritic in nature, with diffuse excoriations, with mild weeping.  No central clearing, no blisters, no scaling.  Cardiovascular: Regular rate and rhythm without murmur. Radial pulses are intact and equal bilaterally.  Pulmonary: Clear to ausculation. Normal effort. No rales, ronchi, or wheezing.  Abdomen: Soft, non tender, and without distention. Active bowel sounds in all four quadrants. No rebound, guarding, masses   Extremities: No cyanosis, clubbing, erythema, nor edema.   Neurological: Alert and oriented x 3. No cranial nerve deficit. 5/5 myotomes. Sensation intact.   Psychiatric:  Behavior, " mood, and affect are appropriate.       Assessment and Plan:     The following treatment plan was discussed:      1. Rash  Chronic and ongoing condition with exacerbation.  Patient will start taking Zyrtec 4 tablets at night for 1 to 2 weeks then 1 tablet nightly for 3 months.    over-the-counter Sarna with menthol cream and Pepcid.  Ibuprofen 600 mg 2-3 times daily as needed for inflammation.    - cetirizine (ZYRTEC) 10 MG Tab; Take 4 tablets at night for 1 to 2 weeks then 1 tablet nightly for itching  Dispense: 30 Tablet; Refill: 2  - Referral to Dermatology  -Referral to allergist      Any change or worsening of signs or symptoms, patient encouraged to follow-up or report to emergency room for further evaluation. Patient verbalizes understanding and agrees.    Follow-Up: Return in about 7 weeks (around 1/25/2022) for Rash.      PLEASE NOTE: This dictation was created using voice recognition software. I have made every reasonable attempt to correct obvious errors, but I expect that there are errors of grammar and possibly content that I did not discover before finalizing the note.

## 2021-12-08 NOTE — PATIENT INSTRUCTIONS
Taking Zyrtec as prescribed.   over-the-counter Sarna with menthol.  Start taking over-the-counter Pepcid once daily.  You can take over-the-counter ibuprofen 600 mg 2-3 times daily as needed for inflammation from rash.

## 2021-12-08 NOTE — ASSESSMENT & PLAN NOTE
This is a chronic and ongoing condition.  Patient has been seen off-and-on in the urgent care since July 2021 for allergic dermatitis.  She has tried multiple over-the-counter medications including oral Benadryl, Benadryl cream, and cortisone 10.  She has also been prescribed Clobetasol 0.05% and prednisone which has given her some relief however it is only been short-term.  Approximately a week after the steroid treatments her symptoms come back.  She is recently prescribed on 11- prednisone taper.  She reports that back in July the rash appeared and she thinks it was related to when she was weed eating and pulling weeds in her yard however it has never completely gone away.  She denies anyone else in the family with this rash.  She denies any history of anyone diagnosed with scabies.  She denies any new medications.  She denies any change in detergents or fragrances.

## 2022-01-26 ENCOUNTER — OFFICE VISIT (OUTPATIENT)
Dept: MEDICAL GROUP | Facility: PHYSICIAN GROUP | Age: 62
End: 2022-01-26
Payer: COMMERCIAL

## 2022-01-26 VITALS
HEART RATE: 81 BPM | OXYGEN SATURATION: 97 % | HEIGHT: 63 IN | WEIGHT: 163 LBS | BODY MASS INDEX: 28.88 KG/M2 | TEMPERATURE: 97.6 F | DIASTOLIC BLOOD PRESSURE: 80 MMHG | RESPIRATION RATE: 20 BRPM | SYSTOLIC BLOOD PRESSURE: 120 MMHG

## 2022-01-26 DIAGNOSIS — L03.113 CELLULITIS OF RIGHT UPPER EXTREMITY: ICD-10-CM

## 2022-01-26 DIAGNOSIS — R21 RASH: ICD-10-CM

## 2022-01-26 PROCEDURE — 99214 OFFICE O/P EST MOD 30 MIN: CPT | Performed by: NURSE PRACTITIONER

## 2022-01-26 RX ORDER — CLOBETASOL PROPIONATE 0.5 MG/G
1 CREAM TOPICAL 2 TIMES DAILY
Qty: 30 G | Refills: 1 | Status: SHIPPED | OUTPATIENT
Start: 2022-01-26 | End: 2022-08-19

## 2022-01-26 RX ORDER — CLINDAMYCIN HYDROCHLORIDE 300 MG/1
300 CAPSULE ORAL 3 TIMES DAILY
Qty: 21 CAPSULE | Refills: 0 | Status: SHIPPED | OUTPATIENT
Start: 2022-01-26 | End: 2022-02-02

## 2022-01-26 RX ORDER — METHYLPREDNISOLONE 4 MG/1
TABLET ORAL
Qty: 21 TABLET | Refills: 0 | Status: SHIPPED | OUTPATIENT
Start: 2022-01-26 | End: 2022-03-08

## 2022-01-26 RX ORDER — NAPROXEN 250 MG/1
250 TABLET ORAL 2 TIMES DAILY WITH MEALS
COMMUNITY
End: 2022-03-08

## 2022-01-26 ASSESSMENT — FIBROSIS 4 INDEX: FIB4 SCORE: 0.58

## 2022-01-26 ASSESSMENT — PATIENT HEALTH QUESTIONNAIRE - PHQ9: CLINICAL INTERPRETATION OF PHQ2 SCORE: 0

## 2022-01-26 NOTE — ASSESSMENT & PLAN NOTE
Is a chronic ongoing condition.  Patient continues to have a allergic dermatitis rash on bilateral upper arms and lower ankles.  She is currently using Zyrtec, cream with menthol, and Clobetasol 0.05% and has had mild improvement on most areas.  Her right wrist today is red, mildly swollen and warm to the touch.  She also has pain with movement.  She did follow-up with her an allergist and was recommended to change shampoos.  She reports that they are unsure of what would be causing it.  She does have an appointment scheduled with dermatology however is not until March.    Plan:  Continue with Zyrtec, over-the-counter lotion with menthol, and Cobetasol 0.05%.   New prescription of clindamycin was sent in for cellulitis symptoms.  Close follow-up in 1 month.  Encourage patient to keep dermatology appointment.  ER precautions were discussed

## 2022-01-26 NOTE — PROGRESS NOTES
Chief Complaint   Patient presents with   • Rash     hands, arms, itching        Subjective:     HPI:   Diane Aguilar is a 61 y.o. female here to discuss the evaluation and management of:      Rash  Is a chronic ongoing condition.  Patient continues to have a allergic dermatitis rash on bilateral upper arms and lower ankles.  She is currently using Zyrtec, cream with menthol, and Clobetasol 0.05% and has had mild improvement on most areas.  Her right wrist today is red, mildly swollen and warm to the touch.  She also has pain with movement.  She did follow-up with her an allergist and was recommended to change shampoos.  She reports that they are unsure of what would be causing it.  She does have an appointment scheduled with dermatology however is not until March.    Plan:  Continue with Zyrtec, over-the-counter lotion with menthol, and Cobetasol 0.05%.   New prescription of clindamycin was sent in for cellulitis symptoms.  Close follow-up in 1 month.  Encourage patient to keep dermatology appointment.  ER precautions were discussed              ROS:  Gen: no fevers/chills, no changes in weight  Pulm: no sob, no cough  CV: no chest pain, no palpitations  Skin: Per HPI  Neuro: no headaches, no numbness/tingling  Heme/Lymph: no easy bruising    Allergies   Allergen Reactions   • Codeine    • Pcn [Penicillins]        Current medicines (including changes today)  Current Outpatient Medications   Medication Sig Dispense Refill   • naproxen (NAPROSYN) 250 MG Tab Take 250 mg by mouth 2 times a day with meals.     • methylPREDNISolone (MEDROL DOSEPAK) 4 MG Tablet Therapy Pack As directed on the packaging label. 21 Tablet 0   • clindamycin (CLEOCIN) 300 MG Cap Take 1 Capsule by mouth 3 times a day for 7 days. 21 Capsule 0   • clobetasol (TEMOVATE) 0.05 % Cream Apply 1 Application topically 2 times a day. 30 g 1   • cetirizine (ZYRTEC) 10 MG Tab Take 4 tablets at night for 1 to 2 weeks then 1 tablet nightly for itching 30  "Tablet 2   • VITAMIN D PO Take  by mouth.     • Calcium Carbonate (CALCIUM 500 PO) Take  by mouth.     • levothyroxine (SYNTHROID) 112 MCG Tab Take 1 tablet by mouth every morning on an empty stomach. 90 tablet 3   • estradiol-norethindrone (ACTIVELLA) 1-0.5 MG per tablet Take 1 tablet by mouth every day.     • ibuprofen (MOTRIN) 200 MG Tab Take 200 mg by mouth every 6 hours as needed. (Patient not taking: Reported on 1/26/2022)       No current facility-administered medications for this visit.       Social History     Tobacco Use   • Smoking status: Current Every Day Smoker     Packs/day: 1.00     Years: 23.00     Pack years: 23.00     Types: Cigarettes   • Smokeless tobacco: Never Used   Vaping Use   • Vaping Use: Never used   Substance Use Topics   • Alcohol use: Yes     Comment: occ   • Drug use: Never       Patient Active Problem List    Diagnosis Date Noted   • Cellulitis of right upper extremity 01/26/2022   • Rash 12/07/2021   • Hashimoto's thyroiditis 08/02/2021   • Vitamin D deficiency 08/02/2021   • Postmenopausal 08/02/2021   • Tobacco dependence due to cigarettes 08/02/2021       Family History   Problem Relation Age of Onset   • Other Father         ALS   • Lung Disease Paternal Grandmother           Objective:     /80 (BP Location: Right arm, Patient Position: Sitting, BP Cuff Size: Adult)   Pulse 81   Temp 36.4 °C (97.6 °F) (Temporal)   Resp 20   Ht 1.6 m (5' 3\")   Wt 73.9 kg (163 lb)   SpO2 97%  Body mass index is 28.87 kg/m².    Physical Exam:  Constitutional: Well-developed and well-nourished female in NAD. Not diaphoretic. No distress.   Skin :Multiple scattered raised lesions on both upper and lower extremities that are erythematous, pruritic in nature, with diffuse excoriations, No weeping.  No central clearing, no blisters, no scaling.  Head: Atraumatic without lesions.  Eyes: Conjunctivae and extraocular motions are normal. Pupils are equal, round, and reactive to light. No " scleral icterus.   Cardiovascular: Regular rate and rhythm without murmur. Radial pulses are intact and equal bilaterally.  Pulmonary: Clear to ausculation. Normal effort. No rales, ronchi, or wheezing.  Extremities: No cyanosis, clubbing, erythema, nor edema.   Neurological: Alert and oriented x 3. No cranial nerve deficit. 5/5 myotomes. Sensation intact.   Psychiatric:  Behavior, mood, and affect are appropriate.      Assessment and Plan:     The following treatment plan was discussed:      1. Rash  - methylPREDNISolone (MEDROL DOSEPAK) 4 MG Tablet Therapy Pack; As directed on the packaging label.  Dispense: 21 Tablet; Refill: 0  - clindamycin (CLEOCIN) 300 MG Cap; Take 1 Capsule by mouth 3 times a day for 7 days.  Dispense: 21 Capsule; Refill: 0  - clobetasol (TEMOVATE) 0.05 % Cream; Apply 1 Application topically 2 times a day.  Dispense: 30 g; Refill: 1    2. Cellulitis of right upper extremity  - clindamycin (CLEOCIN) 300 MG Cap; Take 1 Capsule by mouth 3 times a day for 7 days.  Dispense: 21 Capsule; Refill: 0    Other orders  - naproxen (NAPROSYN) 250 MG Tab; Take 250 mg by mouth 2 times a day with meals.      Any change or worsening of signs or symptoms, patient encouraged to follow-up or report to emergency room for further evaluation. Patient verbalizes understanding and agrees.    Follow-Up: Return in about 4 weeks (around 2/23/2022) for Rash.      PLEASE NOTE: This dictation was created using voice recognition software. I have made every reasonable attempt to correct obvious errors, but I expect that there are errors of grammar and possibly content that I did not discover before finalizing the note.

## 2022-03-08 ENCOUNTER — OFFICE VISIT (OUTPATIENT)
Dept: MEDICAL GROUP | Facility: PHYSICIAN GROUP | Age: 62
End: 2022-03-08
Payer: COMMERCIAL

## 2022-03-08 ENCOUNTER — HOSPITAL ENCOUNTER (OUTPATIENT)
Dept: LAB | Facility: MEDICAL CENTER | Age: 62
End: 2022-03-08
Attending: NURSE PRACTITIONER
Payer: COMMERCIAL

## 2022-03-08 VITALS
BODY MASS INDEX: 26.23 KG/M2 | WEIGHT: 157.4 LBS | HEIGHT: 65 IN | HEART RATE: 77 BPM | RESPIRATION RATE: 14 BRPM | DIASTOLIC BLOOD PRESSURE: 78 MMHG | OXYGEN SATURATION: 96 % | TEMPERATURE: 97.1 F | SYSTOLIC BLOOD PRESSURE: 118 MMHG

## 2022-03-08 DIAGNOSIS — R21 RASH: ICD-10-CM

## 2022-03-08 DIAGNOSIS — M79.89 SWELLING OF BOTH HANDS: ICD-10-CM

## 2022-03-08 DIAGNOSIS — L03.113 CELLULITIS OF RIGHT UPPER EXTREMITY: ICD-10-CM

## 2022-03-08 LAB — RHEUMATOID FACT SER IA-ACNC: 80 IU/ML (ref 0–14)

## 2022-03-08 PROCEDURE — 36415 COLL VENOUS BLD VENIPUNCTURE: CPT

## 2022-03-08 PROCEDURE — 86431 RHEUMATOID FACTOR QUANT: CPT

## 2022-03-08 PROCEDURE — 86039 ANTINUCLEAR ANTIBODIES (ANA): CPT

## 2022-03-08 PROCEDURE — 99214 OFFICE O/P EST MOD 30 MIN: CPT | Performed by: NURSE PRACTITIONER

## 2022-03-08 PROCEDURE — 86038 ANTINUCLEAR ANTIBODIES: CPT

## 2022-03-08 RX ORDER — MELOXICAM 7.5 MG/1
7.5 TABLET ORAL DAILY
Qty: 30 TABLET | Refills: 1 | Status: SHIPPED | OUTPATIENT
Start: 2022-03-08 | End: 2022-08-19

## 2022-03-08 ASSESSMENT — FIBROSIS 4 INDEX: FIB4 SCORE: 0.58

## 2022-03-08 NOTE — ASSESSMENT & PLAN NOTE
Chronic and ongoing condition.  Patient reports that approximately every 3 months she continues to have this pruritic rash on her hands and lower extremities.  Bilateral upper extremity joints and wrists also become swollen and painful with palpation.  She does continue to use Zyrtec and cream with menthol in it to help with her symptoms.    She did follow-up with dermatology today who started her on Ultravate 0.05% cream for the next 2 weeks.  Patient does have follow-up in 2 weeks with dermatology.    Plan  Patient will continue with Zyrtec and menthol lotion.  Patient will start new prescription as ordered by dermatology.  Encourage patient to keep follow-up with dermatologist.  Discussed with patient running labs to check for autoimmune disorders.  Patient was agreeable to this.  Orders were placed as indicated below.

## 2022-03-08 NOTE — ASSESSMENT & PLAN NOTE
See additional notes under rash.  This is a chronic and ongoing condition.    Plan   labs were ordered as indicated below.  Discussed possible autoimmune etiologies

## 2022-03-08 NOTE — PROGRESS NOTES
Chief Complaint   Patient presents with   • Follow-Up     Rash/ hand swelling       Subjective:     HPI:   Diane Aguilar is a 61 y.o. female here to discuss the evaluation and management of:      Rash  Chronic and ongoing condition.  Patient reports that approximately every 3 months she continues to have this pruritic rash on her hands and lower extremities.  Bilateral upper extremity joints and wrists also become swollen and painful with palpation.  She does continue to use Zyrtec and cream with menthol in it to help with her symptoms.    She did follow-up with dermatology today who started her on Ultravate 0.05% cream for the next 2 weeks.  Patient does have follow-up in 2 weeks with dermatology.    Plan  Patient will continue with Zyrtec and menthol lotion.  Patient will start new prescription as ordered by dermatology.  Encourage patient to keep follow-up with dermatologist.  Discussed with patient running labs to check for autoimmune disorders.  Patient was agreeable to this.  Orders were placed as indicated below.    Cellulitis of right upper extremity  This is resolved.  Patient finished antibiotic treatment and has had improvement.  No cellulitis indicated today during physical exam.    Swelling of both hands  See additional notes under rash.  This is a chronic and ongoing condition.    Plan   labs were ordered as indicated below.  Discussed possible autoimmune etiologies            ROS:  Gen: no fevers/chills, no changes in weight  Pulm: no sob, no cough  CV: no chest pain, no palpitations  GI: no nausea/vomiting, no diarrhea  MSk: Positive per HPI  Skin: Positive HPI  Neuro: no headaches, no numbness/tingling      Allergies   Allergen Reactions   • Codeine    • Pcn [Penicillins]        Current medicines (including changes today)  Current Outpatient Medications   Medication Sig Dispense Refill   • halobetasol (ULTRAVATE) 0.05 % cream Apply  topically 2 times a day. Times 2 weeks     • meloxicam (MOBIC) 7.5 MG  Tab Take 1 Tablet by mouth every day. 30 Tablet 1   • clobetasol (TEMOVATE) 0.05 % Cream Apply 1 Application topically 2 times a day. 30 g 1   • VITAMIN D PO Take  by mouth.     • Calcium Carbonate (CALCIUM 500 PO) Take  by mouth.     • levothyroxine (SYNTHROID) 112 MCG Tab Take 1 tablet by mouth every morning on an empty stomach. 90 tablet 3   • estradiol-norethindrone (ACTIVELLA) 1-0.5 MG per tablet Take 1 tablet by mouth every day.     • cetirizine (ZYRTEC) 10 MG Tab Take 4 tablets at night for 1 to 2 weeks then 1 tablet nightly for itching 30 Tablet 2   • ibuprofen (MOTRIN) 200 MG Tab Take 200 mg by mouth every 6 hours as needed. (Patient not taking: No sig reported)       No current facility-administered medications for this visit.          Objective:     No visits with results within 1 Month(s) from this visit.   Latest known visit with results is:   Hospital Outpatient Visit on 07/24/2021   Component Date Value Ref Range Status   • Microsomal -Tpo- Abs 07/24/2021 51.2 (A) 0.0 - 9.0 IU/mL Final    Comment: Performed By: [facility]  [address] [place], [place] [id number]  : Susan Brown MD     • Anti-Thyroglobulin 07/24/2021 286.8 (A) 0.0 - 4.0 IU/mL Final    Comment: INTERPRETIVE INFORMATION: Thyroglobulin Antibody    A value of 4.0 IU/mL or less indicates a negative result for  thyroglobulin antibodies.  The Thyroglobulin Antibody assay is being performed using the  [employer] Access DxI method.  Performed By: [facility]  [address] [place] [id number]  : Susan Brown MD     • TSH 07/24/2021 0.630  0.380 - 5.330 uIU/mL Final    Comment: Please note new reference ranges effective 12/14/2017 10:00 AM    Pregnant Females, 1st Trimester  0.050-3.700  Pregnant Females, 2nd Trimester  0.310-4.350  Pregnant Females, 3rd Trimester  0.410-5.180     • 25-Hydroxy   Vitamin D 25 07/24/2021 17 (A) 30 - 100 ng/mL Final    Comment: Adult Ranges:    <20 ng/mL - Deficiency  20-29 ng/mL - Insufficiency   ng/mL - Sufficiency  Effective 3/18/2020, this electrochemiluminescence binding assay is  performed using Roche leonides e immunoassay analyzer.  The [employer] Vitamin D  total II assay is intended for the quantitative determination of total 25  hydroxyvitamin D in human serum and plasma. This assay is to be used as an  aid in the assessment of vitamin D sufficiency in adults.     • Cholesterol,Tot 07/24/2021 180  100 - 199 mg/dL Final   • Triglycerides 07/24/2021 96  0 - 149 mg/dL Final   • HDL 07/24/2021 76  >=40 mg/dL Final   • LDL 07/24/2021 85  <100 mg/dL Final   • Sodium 07/24/2021 140  135 - 145 mmol/L Final   • Potassium 07/24/2021 4.1  3.6 - 5.5 mmol/L Final   • Chloride 07/24/2021 104  96 - 112 mmol/L Final   • Co2 07/24/2021 24  20 - 33 mmol/L Final   • Anion Gap 07/24/2021 12.0  7.0 - 16.0 Final   • Glucose 07/24/2021 83  65 - 99 mg/dL Final   • Bun 07/24/2021 14  8 - 22 mg/dL Final   • Creatinine 07/24/2021 0.92  0.50 - 1.40 mg/dL Final   • Calcium 07/24/2021 9.0  8.5 - 10.5 mg/dL Final   • AST(SGOT) 07/24/2021 12  12 - 45 U/L Final   • ALT(SGPT) 07/24/2021 16  2 - 50 U/L Final   • Alkaline Phosphatase 07/24/2021 68  30 - 99 U/L Final   • Total Bilirubin 07/24/2021 0.4  0.1 - 1.5 mg/dL Final   • Albumin 07/24/2021 4.3  3.2 - 4.9 g/dL Final   • Total Protein 07/24/2021 7.0  6.0 - 8.2 g/dL Final   • Globulin 07/24/2021 2.7  1.9 - 3.5 g/dL Final   • A-G Ratio 07/24/2021 1.6  g/dL Final   • WBC 07/24/2021 14.7 (A) 4.8 - 10.8 K/uL Final   • RBC 07/24/2021 4.86  4.20 - 5.40 M/uL Final   • Hemoglobin 07/24/2021 15.5  12.0 - 16.0 g/dL Final   • Hematocrit 07/24/2021 48.1 (A) 37.0 - 47.0 % Final   • MCV 07/24/2021 99.0 (A) 81.4 - 97.8 fL Final   • MCH 07/24/2021 31.9  27.0 - 33.0 pg Final   • MCHC 07/24/2021 32.2 (A) 33.6 - 35.0 g/dL Final   • RDW 07/24/2021 47.6  35.9 - 50.0 fL Final   • Platelet Count 07/24/2021 309  164 - 446 K/uL Final   • MPV  "07/24/2021 10.7  9.0 - 12.9 fL Final   • Neutrophils-Polys 07/24/2021 62.30  44.00 - 72.00 % Final   • Lymphocytes 07/24/2021 25.00  22.00 - 41.00 % Final   • Monocytes 07/24/2021 5.60  0.00 - 13.40 % Final   • Eosinophils 07/24/2021 5.90  0.00 - 6.90 % Final   • Basophils 07/24/2021 0.90  0.00 - 1.80 % Final   • Immature Granulocytes 07/24/2021 0.30  0.00 - 0.90 % Final   • Nucleated RBC 07/24/2021 0.00  /100 WBC Final   • Neutrophils (Absolute) 07/24/2021 9.15 (A) 2.00 - 7.15 K/uL Final    Includes immature neutrophils, if present.   • Lymphs (Absolute) 07/24/2021 3.68  1.00 - 4.80 K/uL Final   • Monos (Absolute) 07/24/2021 0.82  0.00 - 0.85 K/uL Final   • Eos (Absolute) 07/24/2021 0.87 (A) 0.00 - 0.51 K/uL Final   • Baso (Absolute) 07/24/2021 0.13 (A) 0.00 - 0.12 K/uL Final   • Immature Granulocytes (abs) 07/24/2021 0.05  0.00 - 0.11 K/uL Final   • NRBC (Absolute) 07/24/2021 0.00  K/uL Final   • Fasting Status 07/24/2021 Fasting   Final   • GFR If  07/24/2021 >60  >60 mL/min/1.73 m 2 Final   • GFR If Non  07/24/2021 >60  >60 mL/min/1.73 m 2 Final       /78   Pulse 77   Temp 36.2 °C (97.1 °F)   Resp 14   Ht 1.638 m (5' 4.5\")   Wt 71.4 kg (157 lb 6.4 oz)   SpO2 96%  Body mass index is 26.6 kg/m².    Physical Exam:  Constitutional: Well-developed and well-nourished female in NAD. Not diaphoretic. No distress.   Skin: Multiple scattered raised lesions on both upper extremities and hands that are erythemic in nature and pruritic with mild excoriation on her fingers.  There is no weeping, or signs of infection.  Bilateral lower extremities around ankles excoriation is noted.  Biopsies were taken today from dermatology.  Eyes: Conjunctivae and extraocular motions are normal. Pupils are equal, round, and reactive to light. No scleral icterus.   Cardiovascular: Regular rate and rhythm without murmur. Radial pulses are intact and equal bilaterally.  Pulmonary: Clear to " ausculation. Normal effort. No rales, ronchi, or wheezing.   Extremities: No cyanosis, clubbing, erythema, nor edema.   Neurological: Alert and oriented x 3.   Psychiatric:  Behavior, mood, and affect are appropriate.    Assessment and Plan:     The following treatment plan was discussed:    1. Swelling of both hands  - ANTI-NUCLEAR ANTIBODY SERUM; Future  - RHEUMATOID ARTHRITIS FACTOR; Future  - meloxicam (MOBIC) 7.5 MG Tab; Take 1 Tablet by mouth every day.  Dispense: 30 Tablet; Refill: 1  - T.PALLIDUM AB EIA; Future    2. Rash  - ANTI-NUCLEAR ANTIBODY SERUM; Future  - RHEUMATOID ARTHRITIS FACTOR; Future  - meloxicam (MOBIC) 7.5 MG Tab; Take 1 Tablet by mouth every day.  Dispense: 30 Tablet; Refill: 1  - T.PALLIDUM AB EIA; Future    3. Cellulitis of right upper extremity    Other orders  - halobetasol (ULTRAVATE) 0.05 % cream; Apply  topically 2 times a day. Times 2 weeks      Any change or worsening of signs or symptoms, patient encouraged to follow-up or report to emergency room for further evaluation. Patient verbalizes understanding and agrees.    Follow-Up: Return in about 5 weeks (around 4/12/2022) for Follow-up for rash on hands and lower extremities.      PLEASE NOTE: This dictation was created using voice recognition software. I have made every reasonable attempt to correct obvious errors, but I expect that there are errors of grammar and possibly content that I did not discover before finalizing the note.

## 2022-03-08 NOTE — ASSESSMENT & PLAN NOTE
This is resolved.  Patient finished antibiotic treatment and has had improvement.  No cellulitis indicated today during physical exam.

## 2022-03-10 ENCOUNTER — PATIENT MESSAGE (OUTPATIENT)
Dept: MEDICAL GROUP | Facility: PHYSICIAN GROUP | Age: 62
End: 2022-03-10
Payer: COMMERCIAL

## 2022-03-10 LAB — NUCLEAR IGG SER QL IA: DETECTED

## 2022-03-11 DIAGNOSIS — R76.8 POSITIVE ANA (ANTINUCLEAR ANTIBODY): ICD-10-CM

## 2022-03-11 DIAGNOSIS — M05.80 POLYARTHRITIS WITH POSITIVE RHEUMATOID FACTOR (HCC): ICD-10-CM

## 2022-03-11 RX ORDER — PREDNISONE 2.5 MG/1
2.5 TABLET ORAL DAILY
Qty: 90 TABLET | Refills: 2 | Status: SHIPPED | OUTPATIENT
Start: 2022-03-11 | End: 2022-03-22 | Stop reason: DRUGHIGH

## 2022-03-12 LAB
ANA INTERPRETIVE COMMENT Q5143: ABNORMAL
ANA PATTERN Q5144: ABNORMAL
ANA TITER Q5145: ABNORMAL
ANTINUCLEAR ANTIBODY (ANA), HEP-2, IGG Q5142: DETECTED
CYTOPLASMIC PATTERN TITER Q5148: ABNORMAL

## 2022-03-17 ENCOUNTER — PATIENT MESSAGE (OUTPATIENT)
Dept: MEDICAL GROUP | Facility: PHYSICIAN GROUP | Age: 62
End: 2022-03-17

## 2022-03-17 ENCOUNTER — OFFICE VISIT (OUTPATIENT)
Dept: URGENT CARE | Facility: PHYSICIAN GROUP | Age: 62
End: 2022-03-17
Payer: COMMERCIAL

## 2022-03-17 VITALS
BODY MASS INDEX: 26.16 KG/M2 | DIASTOLIC BLOOD PRESSURE: 78 MMHG | TEMPERATURE: 97.8 F | RESPIRATION RATE: 16 BRPM | HEART RATE: 78 BPM | OXYGEN SATURATION: 96 % | WEIGHT: 157 LBS | SYSTOLIC BLOOD PRESSURE: 132 MMHG | HEIGHT: 65 IN

## 2022-03-17 DIAGNOSIS — M05.741 RHEUMATOID ARTHRITIS INVOLVING BOTH HANDS WITH POSITIVE RHEUMATOID FACTOR (HCC): ICD-10-CM

## 2022-03-17 DIAGNOSIS — M19.90 ARTHRITIS: ICD-10-CM

## 2022-03-17 DIAGNOSIS — M05.742 RHEUMATOID ARTHRITIS INVOLVING BOTH HANDS WITH POSITIVE RHEUMATOID FACTOR (HCC): ICD-10-CM

## 2022-03-17 PROCEDURE — 99213 OFFICE O/P EST LOW 20 MIN: CPT | Performed by: PHYSICIAN ASSISTANT

## 2022-03-17 RX ORDER — PREDNISONE 10 MG/1
10 TABLET ORAL DAILY
Qty: 5 TABLET | Refills: 0 | Status: SHIPPED | OUTPATIENT
Start: 2022-03-17 | End: 2022-03-22 | Stop reason: DRUGHIGH

## 2022-03-17 ASSESSMENT — FIBROSIS 4 INDEX: FIB4 SCORE: 0.58

## 2022-03-17 ASSESSMENT — ENCOUNTER SYMPTOMS
EYE REDNESS: 0
SORE THROAT: 0
VOMITING: 0
SHORTNESS OF BREATH: 0
NAUSEA: 0
FEVER: 0
EYE DISCHARGE: 0
PAIN: 1
HEADACHES: 0
COUGH: 0

## 2022-03-17 NOTE — PROGRESS NOTES
Subjective     Diane Aguilar is a 61 y.o. female who presents with Pain (Located in hands, pt is requesting to have note excusing her from work until she is able to see her doctor for arthritis 03/22/2022)          This is a recurrent problem.  The patient presents to clinic complaining of pain to her bilateral wrists and hands.  The patient states this has been an ongoing problem since July 2021.  The patient states that she was recently diagnosed with rheumatoid arthritis and started on prednisone.  The patient states over the past week she has been experiencing increasing pain and swelling to her bilateral wrists and hands.  The patient reports no recent injury or trauma to her wrist/hands.  The patient also reports no bruising or redness.  The patient states that she is unable to perform her job duties secondary to pain.  The patient notes slight intermittent tingling of her bilateral wrists/hands.  The patient also reports increased pain with use and movement of the wrist/hands.  The patient has been taking the prednisone as prescribed.  Patient states she was also recently prescribed meloxicam.  Patient has not taken any additional OTC medications for her current symptoms.  Patient is requesting a work note at this time.  Patient states she is scheduled to follow-up with a rheumatologist next week.    Pain  Pertinent negatives include no chest pain, congestion, coughing, fever, headaches, nausea, rash, sore throat or vomiting.     PMH:  has a past medical history of Thyroid disease and Tobacco dependence due to cigarettes.    She has no past medical history of Anemia, Anxiety, Arrhythmia, Arthritis, Asthma, Blood transfusion without reported diagnosis, Cancer ([facility]), Clotting disorder ([facility]), COPD (chronic obstructive pulmonary disease) ([facility]), Depression, Diabetes ([facility]), GERD (gastroesophageal reflux disease), Glaucoma, Heart attack ([facility]), Heart murmur, HIV (human immunodeficiency virus infection) ([facility]),  Hyperlipidemia, Hypertension, Kidney disease, Migraine, Pulmonary emphysema (HCC), Seizure (HCC), Stroke (HCC), or Tuberculosis.  MEDS:   Current Outpatient Medications:   •  halobetasol (ULTRAVATE) 0.05 % cream, Apply  topically 2 times a day. Times 2 weeks, Disp: , Rfl:   •  meloxicam (MOBIC) 7.5 MG Tab, Take 1 Tablet by mouth every day., Disp: 30 Tablet, Rfl: 1  •  clobetasol (TEMOVATE) 0.05 % Cream, Apply 1 Application topically 2 times a day., Disp: 30 g, Rfl: 1  •  cetirizine (ZYRTEC) 10 MG Tab, Take 4 tablets at night for 1 to 2 weeks then 1 tablet nightly for itching, Disp: 30 Tablet, Rfl: 2  •  VITAMIN D PO, Take  by mouth., Disp: , Rfl:   •  Calcium Carbonate (CALCIUM 500 PO), Take  by mouth., Disp: , Rfl:   •  levothyroxine (SYNTHROID) 112 MCG Tab, Take 1 tablet by mouth every morning on an empty stomach., Disp: 90 tablet, Rfl: 3  •  estradiol-norethindrone (ACTIVELLA) 1-0.5 MG per tablet, Take 1 tablet by mouth every day., Disp: , Rfl:   •  predniSONE (DELTASONE) 10 MG Tab, Take 1 Tablet by mouth every day for 5 days. (Patient not taking: Reported on 3/17/2022), Disp: 5 Tablet, Rfl: 0  •  prednisONE (DELTASONE) 2.5 MG Tab, Take 1 Tablet by mouth every day. (Patient not taking: Reported on 3/17/2022), Disp: 90 Tablet, Rfl: 2  •  ibuprofen (MOTRIN) 200 MG Tab, Take 200 mg by mouth every 6 hours as needed. (Patient not taking: No sig reported), Disp: , Rfl:   ALLERGIES:   Allergies   Allergen Reactions   • Codeine    • Pcn [Penicillins]      SURGHX: No past surgical history on file.  SOCHX:  reports that she has been smoking cigarettes. She has a 23.00 pack-year smoking history. She has never used smokeless tobacco. She reports current alcohol use. She reports that she does not use drugs.  FH: Family history was reviewed, no pertinent findings to report      Review of Systems   Constitutional: Negative for fever.   HENT: Negative for congestion, ear pain and sore throat.    Eyes: Negative for discharge and  "redness.   Respiratory: Negative for cough and shortness of breath.    Cardiovascular: Negative for chest pain and leg swelling.   Gastrointestinal: Negative for nausea and vomiting.   Musculoskeletal: Positive for joint pain.   Skin: Negative for rash.   Neurological: Negative for headaches.              Objective     /78   Pulse 78   Temp 36.6 °C (97.8 °F) (Temporal)   Resp 16   Ht 1.638 m (5' 4.5\")   Wt 71.2 kg (157 lb)   SpO2 96%   BMI 26.53 kg/m²      Physical Exam  Constitutional:       General: She is not in acute distress.     Appearance: Normal appearance. She is well-developed. She is not ill-appearing.   HENT:      Head: Normocephalic and atraumatic.      Right Ear: External ear normal.      Left Ear: External ear normal.      Nose: Nose normal.   Eyes:      Extraocular Movements: Extraocular movements intact.      Conjunctiva/sclera: Conjunctivae normal.   Cardiovascular:      Rate and Rhythm: Normal rate.   Pulmonary:      Effort: Pulmonary effort is normal.   Musculoskeletal:      Right wrist: Swelling and tenderness present. Decreased range of motion. Normal pulse.      Left wrist: Swelling and tenderness present. Decreased range of motion. Normal pulse.      Right hand: Swelling and tenderness present. Decreased range of motion.      Left hand: Swelling and tenderness present. Decreased range of motion.      Cervical back: Normal range of motion and neck supple.   Skin:     General: Skin is warm and dry.   Neurological:      Mental Status: She is alert and oriented to person, place, and time.                             Assessment & Plan        1. Arthritis    Differential diagnoses, supportive care, and indications for immediate follow-up discussed with patient.   Instructed to return to clinic or nearest emergency department for any change in condition, further concerns, or worsening of symptoms.    Continue prednisone as prescribed  Continue meloxicam as prescribed  RICE  Follow-up " withRheumatology as scheduled   Follow-up with PCP as needed  Work note provided  Return to clinic or go tot he ED if symptoms worsen or fail to improve, or if the patient should develop worsening/increasing pain/tenderness, swelling, bruising, redness or warmth to the affected area, decreased ROM, numbness, tingling or weakness, difficulty walking, fever/chills, and/or any concerning symptoms.     Discussed plan with the patient, and she agrees to the above.    I personally reviewed prior external notes and test results pertinent to today's visit.  I have independently reviewed and interpreted all diagnostics ordered during this urgent care visit.       Please note that this dictation was created using voice recognition software. I have made every reasonable attempt to correct obvious errors, but I expect that there may be errors of grammar and possibly content that I did not discover before finalizing the note.     This note was electronically signed by Sonia Love PA-C

## 2022-03-17 NOTE — LETTER
March 17, 2022         Patient: Diane Aguilar   YOB: 1960   Date of Visit: 3/17/2022           To Whom it May Concern:    Diane Aguilar was seen in my clinic on 3/17/2022. Please excuse her from work 3/16, 3/17, and 3/18. She may return to work on 3/21/2022.    If you have any questions or concerns, please don't hesitate to call.        Sincerely,           Sonia Love P.A.-C.  Electronically Signed

## 2022-03-20 NOTE — PROGRESS NOTES
[facility]  [address], [address], [place] [address]  Phone: [phone] / Fax: [phone]    RHEUMATOLOGY NEW PATIENT VISIT NOTE      DATE OF SERVICE: 03/22/2022    REFERRING PROVIDER:  TOM Pretty [address] [facility] [place] [address]      SUBJECTIVE:     CHIEF COMPLAINT:   Chief Complaint   Patient presents with   • New Patient     Evaluation for Polyarthritis with positive rheumatoid factor        HISTORY OF PRESENT ILLNESS:  Diane Aguilar is a 61 y.o. female with pertinent history notable for Hashimoto's hypothyroidism, vitamin D deficiency, polyarthralgia since 7/2021 preceded by hypersensitivity rash from weeding at her yard. Since then she has been treated with several one-week courses of steroids and presently on prednisone 2.5mg daily. With each course of steroid she experienced improvement but flared soon after completing the treatment. She reports ongoing pain/swelling in her hands, wrists, and feet, as well as pain in her lower back and hips, associated with morning stiffness lasting all day. In addition to the low dose prednisone, she has been managing with meloxicam 7.5mg daily which she has been taken for a long time.  Pertinent labs as of 3/8/22: Positive RF of 80, JORGE 1:320 homogenous/cytoplasmic pattern, anti-TPO, and anti-TG; low vitamin D of 17 in 7/2021.    REVIEW OF SYSTEMS:  Except as noted in the history above, a complete review of systems with emphasis on autoimmune inflammatory conditions was otherwise negative for any significant symptoms.      ACTIVE PROBLEM LIST:  Patient Active Problem List   Diagnosis   • Hashimoto's thyroiditis   • Vitamin D deficiency   • Postmenopausal   • Tobacco dependence due to cigarettes   • Rash   • Cellulitis of right upper extremity   • Swelling of both hands   • Seropositive rheumatoid arthritis (HCC)   • High risk medication use   • Positive JORGE (1:320 homogenous)   No  problem-specific Assessment & Plan notes found for this encounter.      PAST MEDICAL HISTORY:  Past Medical History:   Diagnosis Date   • Seropositive rheumatoid arthritis (HCC) 3/22/2022   • Thyroid disease    • Tobacco dependence due to cigarettes        PAST SURGICAL HISTORY:  History reviewed. No pertinent surgical history.    MEDICATIONS:  Current Outpatient Medications   Medication Sig Dispense Refill   • methotrexate 2.5 MG Tab Take 6 Tablets by mouth every 7 days for 30 days. 24 Tablet 3   • folic acid (FOLVITE) 1 MG Tab Take 1 Tablet by mouth every day for 30 days. 30 Tablet 3   • predniSONE (DELTASONE) 5 MG Tab Take 4 Tablets by mouth every day for 7 days, THEN 3 Tablets every day for 7 days, THEN 2 Tablets every day for 7 days, THEN 1 Tablet every day for 7 days. 70 Tablet 0   • halobetasol (ULTRAVATE) 0.05 % cream Apply  topically 2 times a day. Times 2 weeks     • meloxicam (MOBIC) 7.5 MG Tab Take 1 Tablet by mouth every day. 30 Tablet 1   • clobetasol (TEMOVATE) 0.05 % Cream Apply 1 Application topically 2 times a day. 30 g 1   • cetirizine (ZYRTEC) 10 MG Tab Take 4 tablets at night for 1 to 2 weeks then 1 tablet nightly for itching 30 Tablet 2   • VITAMIN D PO Take  by mouth.     • Calcium Carbonate (CALCIUM 500 PO) Take  by mouth.     • levothyroxine (SYNTHROID) 112 MCG Tab Take 1 tablet by mouth every morning on an empty stomach. 90 tablet 3   • estradiol-norethindrone (ACTIVELLA) 1-0.5 MG per tablet Take 1 tablet by mouth every day.       No current facility-administered medications for this visit.       ALLERGIES:   Allergies   Allergen Reactions   • Codeine    • Pcn [Penicillins]        IMMUNIZATIONS:  Immunization History   Administered Date(s) Administered   • Influenza Seasonal Injectable - Historical Data 12/18/2012   • Influenza Vac Subunit Quad Inj (Pf) 10/15/2018   • Influenza Vaccine Quad Inj (Pf) 10/07/2017, 10/18/2019   • Influenza Vaccine Quad Recombinant 10/06/2020, 11/18/2021   •  "Moderna SARS-CoV-2 Vaccine 06/04/2021, 07/02/2021   • Zoster Vaccine Live (ZVL) (Zostavax) - HISTORICAL DATA 09/20/2016       SOCIAL HISTORY:   Social History     Tobacco Use   • Smoking status: Current Every Day Smoker     Packs/day: 1.00     Years: 23.00     Pack years: 23.00     Types: Cigarettes   • Smokeless tobacco: Never Used   Vaping Use   • Vaping Use: Never used   Substance Use Topics   • Alcohol use: Yes     Comment: occ   • Drug use: Never       FAMILY HISTORY:  Family History   Problem Relation Age of Onset   • Other Father         ALS   • Lung Disease Paternal Grandmother         OBJECTIVE:     Vital Signs: /70 (BP Location: Left arm, Patient Position: Sitting, BP Cuff Size: Adult)   Pulse 70   Temp 36.9 °C (98.4 °F) (Temporal)   Resp 16   Ht 1.549 m (5' 1\")   Wt 71.7 kg (158 lb)   SpO2 95% Body mass index is 29.85 kg/m².    General: Appears well and comfortable; no acute distress  Eyes: Extraocular muscles and vision intact; no conjunctival lesions  ENT: Wearing mask, but no visible lesions  Head/Neck: Neck supple; no cervical LAD; no scalp lesions  Cardiovascular: Regular rate and rhythm; no murmurs  Respiratory: Clear to auscultation bilaterally; no wheezes, rales, or rhonchi  Gastrointestinal: Abdomen soft and non-tender; no masses or organomegaly  Integumentary: Skin soft and supple; no significant cutaneous lesions  Musculoskeletal: Mild tenderness on hands with MCP squeeze, wrists R>L, and feet with MTP squeeze; no significant swelling or joint effusion; no significant restriction in ROM  Neurologic: No focal sensory or motor deficits  Psychiatric: Mood and affect appropriate      LABORATORY RESULTS REVIEWED AND INTERPRETED BY ME:  Lab Results   Component Value Date/Time    WBC 14.7 (H) 07/24/2021 09:09 AM    RBC 4.86 07/24/2021 09:09 AM    HEMOGLOBIN 15.5 07/24/2021 09:09 AM    HEMATOCRIT 48.1 (H) 07/24/2021 09:09 AM    MCV 99.0 (H) 07/24/2021 09:09 AM    MCH 31.9 07/24/2021 09:09 " AM    MCHC 32.2 (L) 07/24/2021 09:09 AM    RDW 47.6 07/24/2021 09:09 AM    PLATELETCT 309 07/24/2021 09:09 AM    MPV 10.7 07/24/2021 09:09 AM    NEUTS 9.15 (H) 07/24/2021 09:09 AM    LYMPHOCYTES 25.00 07/24/2021 09:09 AM    MONOCYTES 5.60 07/24/2021 09:09 AM    EOSINOPHILS 5.90 07/24/2021 09:09 AM    BASOPHILS 0.90 07/24/2021 09:09 AM     Lab Results   Component Value Date/Time    SODIUM 140 07/24/2021 09:09 AM    POTASSIUM 4.1 07/24/2021 09:09 AM    CHLORIDE 104 07/24/2021 09:09 AM    CO2 24 07/24/2021 09:09 AM    GLUCOSE 83 07/24/2021 09:09 AM    BUN 14 07/24/2021 09:09 AM    CREATININE 0.92 07/24/2021 09:09 AM     Lab Results   Component Value Date/Time    ASTSGOT 12 07/24/2021 09:09 AM    ALTSGPT 16 07/24/2021 09:09 AM    ALKPHOSPHAT 68 07/24/2021 09:09 AM    TBILIRUBIN 0.4 07/24/2021 09:09 AM    TOTPROTEIN 7.0 07/24/2021 09:09 AM    ALBUMIN 4.3 07/24/2021 09:09 AM    CALCIUM 9.0 07/24/2021 09:09 AM     Lab Results   Component Value Date/Time    RHEUMFACTN 80 (H) 03/08/2022 02:32 PM     Lab Results   Component Value Date/Time    ANTINUCAB Detected (A) 03/08/2022 02:32 PM     Lab Results   Component Value Date/Time    TSHULTRASEN 0.630 07/24/2021 09:09 AM    ANTITHYROGL 286.8 (H) 07/24/2021 09:09 AM    MICROSOMALA 51.2 (H) 07/24/2021 09:09 AM     Lab Results   Component Value Date/Time    25HYDROXY 17 (L) 07/24/2021 09:09 AM     Lab Results   Component Value Date/Time    CHOLSTRLTOT 180 07/24/2021 09:09 AM    LDL 85 07/24/2021 09:09 AM    HDL 76 07/24/2021 09:09 AM    TRIGLYCERIDE 96 07/24/2021 09:09 AM       RADIOLOGY RESULTS REVIEWED AND INTERPRETED BY ME:    All relevant laboratory and imaging results reported on this note were reviewed and interpreted by me.      ASSESSMENT AND PLAN:     Diane Aguilar is a 61 y.o. female with history as noted above whose presentation merits the following diagnostic and clinical status impressions and recommendations:    1. Seropositive rheumatoid arthritis (HCC)  Her clinical  picture supports the diagnosis of rheumatoid arthritis with laboratory evidence of immunologic activity and clinical evidence of disease activity. However, the possibility of a concomitant background biomechanical joint pain secondary to osteoarthritis and/or central pain cannot be excluded. That said, it is reasonable to undertake further investigation with confirmatory workup for diagnostic clarification and start appropriate DMARD therapy as noted below.  - CCP ANTIBODY; Future  - Sed Rate; Future  - CRP QUANTITIVE (NON-CARDIAC)  - DX-JOINT SURVEY-HANDS SINGLE VIEW; Future  - DX-JOINT SURVEY-FEET SINGLE VIEW; Future  - Methotrexate 2.5 MG Tab; Take 6 Tablets by mouth every 7 days for 30 days.  Dispense: 24 Tablet; Refill: 3  - PredniSONE (DELTASONE) 5 MG Tab; Take 4 Tablets by mouth every day for 7 days, THEN 3 Tablets every day for 7 days, THEN 2 Tablets every day for 7 days, THEN 1 Tablet every day for 7 days.  Dispense: 70 Tablet; Refill: 0    2. High risk medication use  - Up to date on Zostavax, current season influenza, and Moderna COVID-19 vaccines  - Comp Metabolic Panel; Future  - CBC WITH DIFFERENTIAL; Future  - Would need to check QTB and HBV later in case of need for biologic therapy  - Need routine monitoring labs every 3-4 months  - Folic acid (FOLVITE) 1 MG Tab; Take 1 Tablet by mouth every day for 30 days.  Dispense: 30 Tablet; Refill: 3    3. Positive JORGE (1:320 homogenous)  Notably, positive JORGE can be found in over 10% of the general population with no clinical evidence of autoimmune disease, and occurs in patients with Hashimoto's thyroiditis as a cross-reaction with positive anti-TPO. In this case, her historical and physical do not suggest the presence of an underlying JORGE-associated autoimmune disease. That said, it is reasonable to undertake confirmatory workup as noted below.  - JORGE REFLEXIVE PROFILE; Future    FOLLOW-UP: Return in about 3 months (around 6/22/2022) for Short.            Thank you for giving me the opportunity to participate in the care of Diane Aguilar.    Josr Mayer MD, MS  Rheumatologist, [facility]  , Department of Internal Medicine  [facility]

## 2022-03-20 NOTE — PATIENT INSTRUCTIONS
AFTER VISIT INSTRUCTIONS    Below are important information to help you navigate your healthcare needs and help us serve you safely and effectively:  • If lab tests and imaging studies were ordered, be sure to go get them done.  • If new prescriptions or refills were sent to the pharmacy, be sure to go pick them up.  • Take your medications exactly as prescribed unless instructed otherwise.  • Follow up with your primary care provider and/or specialists as scheduled.  • If there are significant findings from your lab tests and imaging studies, I will notify you with explanations via Tushkyhart or phone call, otherwise you can view them on Telik and let me know if you have any questions.  • Sign up for Telik if you have not already done so, in order to have access to the results of your lab tests and imaging studies, and to be able to send and receive messages from me.  • Please note that Telik messaging is for non-urgent matters that do not require immediate attention and are typically read only during office hours, so do not expect immediate responses from me.

## 2022-03-22 ENCOUNTER — OFFICE VISIT (OUTPATIENT)
Dept: RHEUMATOLOGY | Facility: MEDICAL CENTER | Age: 62
End: 2022-03-22
Payer: COMMERCIAL

## 2022-03-22 VITALS
HEART RATE: 70 BPM | HEIGHT: 61 IN | WEIGHT: 158 LBS | RESPIRATION RATE: 16 BRPM | BODY MASS INDEX: 29.83 KG/M2 | SYSTOLIC BLOOD PRESSURE: 126 MMHG | DIASTOLIC BLOOD PRESSURE: 70 MMHG | TEMPERATURE: 98.4 F | OXYGEN SATURATION: 95 %

## 2022-03-22 DIAGNOSIS — M05.9 SEROPOSITIVE RHEUMATOID ARTHRITIS (HCC): Primary | ICD-10-CM

## 2022-03-22 DIAGNOSIS — Z79.899 HIGH RISK MEDICATION USE: ICD-10-CM

## 2022-03-22 DIAGNOSIS — R76.8 POSITIVE ANA (ANTINUCLEAR ANTIBODY): ICD-10-CM

## 2022-03-22 PROCEDURE — 99204 OFFICE O/P NEW MOD 45 MIN: CPT | Performed by: STUDENT IN AN ORGANIZED HEALTH CARE EDUCATION/TRAINING PROGRAM

## 2022-03-22 RX ORDER — PREDNISONE 5 MG/1
TABLET ORAL
Qty: 70 TABLET | Refills: 0 | Status: SHIPPED | OUTPATIENT
Start: 2022-03-22 | End: 2022-04-19

## 2022-03-22 RX ORDER — FOLIC ACID 1 MG/1
1 TABLET ORAL DAILY
Qty: 30 TABLET | Refills: 3 | Status: SHIPPED | OUTPATIENT
Start: 2022-03-22 | End: 2022-04-21

## 2022-03-22 ASSESSMENT — FIBROSIS 4 INDEX: FIB4 SCORE: 0.58

## 2022-03-22 ASSESSMENT — PAIN SCALES - GENERAL: PAINLEVEL: 8=MODERATE-SEVERE PAIN

## 2022-03-23 ENCOUNTER — APPOINTMENT (OUTPATIENT)
Dept: RADIOLOGY | Facility: IMAGING CENTER | Age: 62
End: 2022-03-23
Attending: STUDENT IN AN ORGANIZED HEALTH CARE EDUCATION/TRAINING PROGRAM
Payer: COMMERCIAL

## 2022-03-23 ENCOUNTER — HOSPITAL ENCOUNTER (OUTPATIENT)
Dept: LAB | Facility: MEDICAL CENTER | Age: 62
End: 2022-03-23
Attending: STUDENT IN AN ORGANIZED HEALTH CARE EDUCATION/TRAINING PROGRAM
Payer: COMMERCIAL

## 2022-03-23 ENCOUNTER — APPOINTMENT (OUTPATIENT)
Dept: URGENT CARE | Facility: PHYSICIAN GROUP | Age: 62
End: 2022-03-23
Payer: COMMERCIAL

## 2022-03-23 DIAGNOSIS — M05.9 SEROPOSITIVE RHEUMATOID ARTHRITIS (HCC): ICD-10-CM

## 2022-03-23 DIAGNOSIS — Z79.899 HIGH RISK MEDICATION USE: ICD-10-CM

## 2022-03-23 LAB
ALBUMIN SERPL BCP-MCNC: 4.4 G/DL (ref 3.2–4.9)
ALBUMIN/GLOB SERPL: 1.6 G/DL
ALP SERPL-CCNC: 64 U/L (ref 30–99)
ALT SERPL-CCNC: 20 U/L (ref 2–50)
ANION GAP SERPL CALC-SCNC: 14 MMOL/L (ref 7–16)
AST SERPL-CCNC: 17 U/L (ref 12–45)
BASOPHILS # BLD AUTO: 0.5 % (ref 0–1.8)
BASOPHILS # BLD: 0.07 K/UL (ref 0–0.12)
BILIRUB SERPL-MCNC: 0.4 MG/DL (ref 0.1–1.5)
BUN SERPL-MCNC: 14 MG/DL (ref 8–22)
CALCIUM SERPL-MCNC: 9.4 MG/DL (ref 8.5–10.5)
CHLORIDE SERPL-SCNC: 103 MMOL/L (ref 96–112)
CO2 SERPL-SCNC: 23 MMOL/L (ref 20–33)
CREAT SERPL-MCNC: 0.83 MG/DL (ref 0.5–1.4)
EOSINOPHIL # BLD AUTO: 0.2 K/UL (ref 0–0.51)
EOSINOPHIL NFR BLD: 1.4 % (ref 0–6.9)
ERYTHROCYTE [DISTWIDTH] IN BLOOD BY AUTOMATED COUNT: 47.2 FL (ref 35.9–50)
GFR SERPLBLD CREATININE-BSD FMLA CKD-EPI: 80 ML/MIN/1.73 M 2
GLOBULIN SER CALC-MCNC: 2.8 G/DL (ref 1.9–3.5)
GLUCOSE SERPL-MCNC: 77 MG/DL (ref 65–99)
HCT VFR BLD AUTO: 48 % (ref 37–47)
HGB BLD-MCNC: 15.6 G/DL (ref 12–16)
IMM GRANULOCYTES # BLD AUTO: 0.05 K/UL (ref 0–0.11)
IMM GRANULOCYTES NFR BLD AUTO: 0.4 % (ref 0–0.9)
LYMPHOCYTES # BLD AUTO: 3.83 K/UL (ref 1–4.8)
LYMPHOCYTES NFR BLD: 27 % (ref 22–41)
MCH RBC QN AUTO: 31.3 PG (ref 27–33)
MCHC RBC AUTO-ENTMCNC: 32.5 G/DL (ref 33.6–35)
MCV RBC AUTO: 96.4 FL (ref 81.4–97.8)
MONOCYTES # BLD AUTO: 0.91 K/UL (ref 0–0.85)
MONOCYTES NFR BLD AUTO: 6.4 % (ref 0–13.4)
NEUTROPHILS # BLD AUTO: 9.1 K/UL (ref 2–7.15)
NEUTROPHILS NFR BLD: 64.3 % (ref 44–72)
NRBC # BLD AUTO: 0 K/UL
NRBC BLD-RTO: 0 /100 WBC
PLATELET # BLD AUTO: 348 K/UL (ref 164–446)
PMV BLD AUTO: 10 FL (ref 9–12.9)
POTASSIUM SERPL-SCNC: 5 MMOL/L (ref 3.6–5.5)
PROT SERPL-MCNC: 7.2 G/DL (ref 6–8.2)
RBC # BLD AUTO: 4.98 M/UL (ref 4.2–5.4)
SODIUM SERPL-SCNC: 140 MMOL/L (ref 135–145)
WBC # BLD AUTO: 14.2 K/UL (ref 4.8–10.8)

## 2022-03-23 PROCEDURE — 86039 ANTINUCLEAR ANTIBODIES (ANA): CPT

## 2022-03-23 PROCEDURE — 77077 JOINT SURVEY SINGLE VIEW: CPT | Mod: TC,FY | Performed by: STUDENT IN AN ORGANIZED HEALTH CARE EDUCATION/TRAINING PROGRAM

## 2022-03-23 PROCEDURE — 86038 ANTINUCLEAR ANTIBODIES: CPT

## 2022-03-23 PROCEDURE — 36415 COLL VENOUS BLD VENIPUNCTURE: CPT

## 2022-03-23 PROCEDURE — 85652 RBC SED RATE AUTOMATED: CPT

## 2022-03-23 PROCEDURE — 86225 DNA ANTIBODY NATIVE: CPT

## 2022-03-23 PROCEDURE — 86200 CCP ANTIBODY: CPT

## 2022-03-23 PROCEDURE — 80053 COMPREHEN METABOLIC PANEL: CPT

## 2022-03-23 PROCEDURE — 86235 NUCLEAR ANTIGEN ANTIBODY: CPT

## 2022-03-23 PROCEDURE — 85025 COMPLETE CBC W/AUTO DIFF WBC: CPT

## 2022-03-24 LAB — ERYTHROCYTE [SEDIMENTATION RATE] IN BLOOD BY WESTERGREN METHOD: 5 MM/HOUR (ref 0–25)

## 2022-03-25 LAB
CCP IGG SERPL-ACNC: 81 UNITS (ref 0–19)
NUCLEAR IGG SER QL IA: DETECTED

## 2022-03-28 LAB
ENA JO1 AB TITR SER: 1 AU/ML (ref 0–40)
ENA SCL70 IGG SER QL: 9 AU/ML (ref 0–40)
ENA SM IGG SER-ACNC: 3 AU/ML (ref 0–40)
ENA SS-B IGG SER IA-ACNC: 0 AU/ML (ref 0–40)
SSA52 R0ENA AB IGG Q0420: 1 AU/ML (ref 0–40)
SSA60 R0ENA AB IGG Q0419: 3 AU/ML (ref 0–40)
U1 SNRNP IGG SER QL: 7 UNITS (ref 0–19)

## 2022-03-29 LAB — DSDNA AB TITR SER CLIF: 15 IU (ref 0–24)

## 2022-04-18 ENCOUNTER — TELEPHONE (OUTPATIENT)
Dept: RHEUMATOLOGY | Facility: MEDICAL CENTER | Age: 62
End: 2022-04-18
Payer: COMMERCIAL

## 2022-04-18 NOTE — TELEPHONE ENCOUNTER
Patient called to ask if she could possible take Claritin for her severe allergies. Patient stated that at the last visit she was asked to stop all allergy meds and she cannot function without them. Ok to resume?? What is safe for patient to take. Please Advise thank you.

## 2022-04-19 NOTE — TELEPHONE ENCOUNTER
Her allergy meds are not contraindicated with her RA treatment and I don't remember telling her to stop them. Okay to resume.    Josr Mayer M.D.

## 2022-06-05 NOTE — PROGRESS NOTES
[facility]  [address] [place] [address], [address], [place] [address]  Phone: [phone] / Fax: [phone]    RHEUMATOLOGY FOLLOW-UP VISIT NOTE      DATE OF SERVICE: 06/10/2022    PRIMARY CARE PROVIDER:   TOM Pretty  [address] [place] [address]    LAST CLINIC VISIT:  4/18/2022      SUBJECTIVE:     CHIEF COMPLAINT:   Chief Complaint   Patient presents with   • Follow-Up     Seropositive Rheumatoid Arthritis       RHEUMATOLOGIC HISTORY:  Diane Aguilar is a 61 y.o. female with pertinent history notable for seropositive rheumatoid arthritis diagnosed in 3/2022, osteoarthritis of multiple joints (hands and feet with radiographic evidence in 3/2022), and Hashimoto's thyroiditis with hypothyroidism. She initially presented on 3/22/2022, reported onset of polyarthralgia in 7/2021 preceded by hypersensitivity rash from weeding at her yard. Since then she had been treated with several one-week courses of steroids and was on prednisone 2.5mg daily when evaluated. With each course of steroid she experienced improvement but flared soon after completing the treatment. In addition, she has been managing with meloxicam 7.5mg daily which she had been taken for a long time. She reported ongoing pain/swelling in her hands, wrists, and feet, as well as pain in her lower back and hips, associated with morning stiffness lasting all day and improving with activity.    Pertinent treatments to date: Methotrexate 15 mg weekly with folic acid 1 mg daily, prednisone 20 mg taper (3/2022-4/2022).  Pertinent labs as of 3/2022: Positive RF 80, ACPA 81, JORGE 1:320 homogenous/cytoplasmic patterns (with negative reflex panel), anti-TPO, and anti-TG; low vitamin D of 17 (in 7/2021); and normal CMP.    INTERVAL HISTORY:  Reports pain/swelling (grade 8-10/10 severity) in hands, wrists, and feet.  Difficulty with using her hands, body aches, and fatigue all interfering with her  activities.  Prednisone 20 mg taper was very helpful but methotrexate 15 mg weekly has not been very effective.    REVIEW OF SYSTEMS:  Except as noted in the history above, a complete review of systems with emphasis on autoimmune inflammatory conditions was otherwise negative for any significant symptoms.      ACTIVE PROBLEM LIST:  Patient Active Problem List   Diagnosis   • Hashimoto's thyroiditis   • Vitamin D deficiency   • Postmenopausal   • Tobacco dependence due to cigarettes   • Rash   • Cellulitis of right upper extremity   • Swelling of both hands   • Seropositive rheumatoid arthritis (HCC)   • High risk medication use   • Positive JORGE (1:320 homogenous)   • Primary osteoarthritis involving multiple joints (hands and feet)   No problem-specific Assessment & Plan notes found for this encounter.      PAST MEDICAL HISTORY:  Past Medical History:   Diagnosis Date   • Seropositive rheumatoid arthritis (HCC) 3/22/2022   • Thyroid disease    • Tobacco dependence due to cigarettes        PAST SURGICAL HISTORY:  No past surgical history on file.    MEDICATIONS:  Current Outpatient Medications   Medication Sig Dispense Refill   • folic acid (FOLVITE) 1 MG Tab Take 1 mg by mouth every day. for 30 days     • methotrexate 2.5 MG Tab Take 8 Tablets by mouth every 7 days. 96 Tablet 3   • predniSONE (DELTASONE) 10 MG Tab Take 3 Tablets by mouth every day for 7 days, THEN 2.5 Tablets every day for 7 days, THEN 2 Tablets every day for 7 days, THEN 1.5 Tablets every day for 7 days, THEN 1 Tablet every day for 7 days, THEN 0.5 Tablets every day for 7 days. Take with food. 74 Tablet 0   • halobetasol (ULTRAVATE) 0.05 % cream Apply  topically 2 times a day. Times 2 weeks     • clobetasol (TEMOVATE) 0.05 % Cream Apply 1 Application topically 2 times a day. 30 g 1   • cetirizine (ZYRTEC) 10 MG Tab Take 4 tablets at night for 1 to 2 weeks then 1 tablet nightly for itching 30 Tablet 2   • VITAMIN D PO Take  by mouth.     • Calcium  "Carbonate (CALCIUM 500 PO) Take  by mouth.     • levothyroxine (SYNTHROID) 112 MCG Tab Take 1 tablet by mouth every morning on an empty stomach. 90 tablet 3   • estradiol-norethindrone (ACTIVELLA) 1-0.5 MG per tablet Take 1 tablet by mouth every day.     • meloxicam (MOBIC) 7.5 MG Tab Take 1 Tablet by mouth every day. (Patient not taking: Reported on 6/10/2022) 30 Tablet 1     No current facility-administered medications for this visit.       ALLERGIES:   Allergies   Allergen Reactions   • Codeine    • Pcn [Penicillins]        IMMUNIZATIONS:  Immunization History   Administered Date(s) Administered   • Influenza Seasonal Injectable - Historical Data 12/18/2012   • Influenza Vac Subunit Quad Inj (Pf) 10/15/2018   • Influenza Vaccine Quad Inj (Pf) 10/07/2017, 10/18/2019   • Influenza Vaccine Quad Recombinant 10/06/2020, 11/18/2021   • Moderna SARS-CoV-2 Vaccine 06/04/2021, 07/02/2021   • Zoster Vaccine Live (ZVL) (Zostavax) - HISTORICAL DATA 09/20/2016       SOCIAL HISTORY:   Social History     Tobacco Use   • Smoking status: Current Every Day Smoker     Packs/day: 1.00     Years: 23.00     Pack years: 23.00     Types: Cigarettes   • Smokeless tobacco: Never Used   Vaping Use   • Vaping Use: Never used   Substance Use Topics   • Alcohol use: Yes     Comment: occ   • Drug use: Never       FAMILY HISTORY:  Family History   Problem Relation Age of Onset   • Other Father         ALS   • Lung Disease Paternal Grandmother         OBJECTIVE:     Vital Signs: /80 (BP Location: Left arm, Patient Position: Sitting, BP Cuff Size: Adult)   Pulse 82   Temp 36.8 °C (98.2 °F) (Temporal)   Resp 16   Ht 1.549 m (5' 1\")   Wt 71.8 kg (158 lb 6.4 oz)   SpO2 93% Body mass index is 29.93 kg/m².    General: Appears well and comfortable; no acute distress  Eyes: Extraocular muscles and vision intact; no conjunctival lesions  ENT: Wearing facemask but no visible lesions  Head/Neck: No scalp lesions; neck supple with no " lymphadenopathy  Cardiovascular: Regular rate and rhythm by palpation  Respiratory: Quiet breathing with no respiratory difficulty  Gastrointestinal: Abdomen soft and non-tender; no masses or organomegaly  Integumentary: Skin soft and supple; no significant cutaneous lesions  Musculoskeletal: Mild to moderate tenderness of wrists/hands (on MCP and PIP squeeze) with puffiness, and feet (on MTP squeeze); range of motion of affected joints limited by pain  Neurologic: No focal sensory or motor deficits  Psychiatric: Mood and affect appropriate      LABORATORY RESULTS REVIEWED AND INTERPRETED BY ME:  Lab Results   Component Value Date/Time    SEDRATEWES 5 03/23/2022 09:46 AM     Lab Results   Component Value Date/Time    RHEUMFACTN 80 (H) 03/08/2022 02:32 PM    CCPANTIBODY 81 (H) 03/23/2022 09:46 AM     Lab Results   Component Value Date/Time    ANTINUCAB Detected (A) 03/23/2022 09:46 AM     Lab Results   Component Value Date/Time    ANTIDNADS 15 03/23/2022 09:46 AM    SMITHAB 3 03/23/2022 09:46 AM    RNPAB 7 03/23/2022 09:46 AM    EMTPURM20 9 03/23/2022 09:46 AM    SSA60 3 03/23/2022 09:46 AM    SSA52 1 03/23/2022 09:46 AM    ANTISSBSJ 0 03/23/2022 09:46 AM    JO1AB 1 03/23/2022 09:46 AM     Lab Results   Component Value Date/Time    MICROSOMALA 51.2 (H) 07/24/2021 09:09 AM    ANTITHYROGL 286.8 (H) 07/24/2021 09:09 AM    TSHULTRASEN 0.630 07/24/2021 09:09 AM     Lab Results   Component Value Date/Time    ASTSGOT 17 03/23/2022 09:46 AM    ALTSGPT 20 03/23/2022 09:46 AM    ALKPHOSPHAT 64 03/23/2022 09:46 AM    TBILIRUBIN 0.4 03/23/2022 09:46 AM    TOTPROTEIN 7.2 03/23/2022 09:46 AM    ALBUMIN 4.4 03/23/2022 09:46 AM     Lab Results   Component Value Date/Time    SODIUM 140 03/23/2022 09:46 AM    POTASSIUM 5.0 03/23/2022 09:46 AM    CHLORIDE 103 03/23/2022 09:46 AM    CO2 23 03/23/2022 09:46 AM    GLUCOSE 77 03/23/2022 09:46 AM    BUN 14 03/23/2022 09:46 AM    CREATININE 0.83 03/23/2022 09:46 AM    CALCIUM 9.4 03/23/2022  09:46 AM     Lab Results   Component Value Date/Time    WBC 14.2 (H) 03/23/2022 09:46 AM    RBC 4.98 03/23/2022 09:46 AM    HEMOGLOBIN 15.6 03/23/2022 09:46 AM    HEMATOCRIT 48.0 (H) 03/23/2022 09:46 AM    MCV 96.4 03/23/2022 09:46 AM    MCH 31.3 03/23/2022 09:46 AM    MCHC 32.5 (L) 03/23/2022 09:46 AM    RDW 47.2 03/23/2022 09:46 AM    PLATELETCT 348 03/23/2022 09:46 AM    MPV 10.0 03/23/2022 09:46 AM    NEUTS 9.10 (H) 03/23/2022 09:46 AM    LYMPHOCYTES 27.00 03/23/2022 09:46 AM    MONOCYTES 6.40 03/23/2022 09:46 AM    EOSINOPHILS 1.40 03/23/2022 09:46 AM    BASOPHILS 0.50 03/23/2022 09:46 AM     Lab Results   Component Value Date/Time    25HYDROXY 17 (L) 07/24/2021 09:09 AM     Lab Results   Component Value Date/Time    CHOLSTRLTOT 180 07/24/2021 09:09 AM    LDL 85 07/24/2021 09:09 AM    HDL 76 07/24/2021 09:09 AM    TRIGLYCERIDE 96 07/24/2021 09:09 AM       RADIOLOGY RESULTS REVIEWED AND INTERPRETED BY ME:  Results for orders placed in visit on 03/23/22    DX-JOINT SURVEY-FEET SINGLE VIEW    Impression  1.  No evidence for inflammatory arthritis    2.  Postoperative changes of the right 5th proximal phalanx in the left 1st metatarsal head    3.  osteoarthritis of both 1st metatarsophalangeal joint    4.  Right accessory navicular, anatomic variant    Results for orders placed in visit on 03/23/22    DX-JOINT SURVEY-HANDS SINGLE VIEW    Impression  1.  No evidence for erosive arthritis    2.  Typical osteoarthritis of bilateral 1st carpometacarpal joint and distal interphalangeal joint      All relevant laboratory and imaging results reported on this note were reviewed and interpreted by me.      ASSESSMENT AND PLAN:     Diane Aguilar is a 61 y.o. female with history as noted above whose presentation merits the following diagnostic and clinical status impressions and recommendations:    1. Seropositive rheumatoid arthritis (HCC)  Clinical picture suggests active or inadequately controlled disease on the current  regimen, so needs dose escalation of methotrexate as noted below. Given that DMARDs can take up to 6 or more weeks to exert clinical effect, need to bridge with a course of steroid taper for more immediate symptom relief in the meantime. If if she remains symptomatic at 20 mg of methotrexate, would add sulfasalazine or switch to leflunomide.  - Sed Rate  - CRP QUANTITIVE (NON-CARDIAC)  - methotrexate 2.5 MG Tab; Take 8 Tablets by mouth every 7 days.  Dispense: 96 Tablet; Refill: 3  - predniSONE (DELTASONE) 10 MG Tab; Take 3 Tablets by mouth every day for 7 days, THEN 2.5 Tablets every day for 7 days, THEN 2 Tablets every day for 7 days, THEN 1.5 Tablets every day for 7 days, THEN 1 Tablet every day for 7 days, THEN 0.5 Tablets every day for 7 days. Take with food.  Dispense: 74 Tablet; Refill: 0    2. Primary osteoarthritis involving multiple joints (hands and feet)  Presumably an important contributor to her overall joint pain burden and can be managed with topical NSAID.  -Trial of Voltaren 1% gel 3-4 times daily as needed    3. Positive JORGE (1:320 homogenous)  Given the negative reflexive panel, reasonable to rule out drug-induced autoimmunity.  - ANTI-HISTONE ABS  - CHROMATIN AB,IGG    4. High risk medication use  No current history, physical findings, or laboratory evidence to suggest significant adverse drug effects or opportunistic infections.  - Comp Metabolic Panel  - CBC WITH DIFFERENTIAL  - Continue folic acid 1 mg daily  - Need to confirm/address age-appropriate vaccines      FOLLOW-UP: Return in about 2 months (around 8/10/2022) for Short.           Thank you for giving me the opportunity to participate in the care of Diane Aguilar.    Josr Mayer MD, MS  Rheumatologist, [facility] - [facility]  , Department of Internal Medicine  [facility]

## 2022-06-05 NOTE — PATIENT INSTRUCTIONS
AFTER VISIT INSTRUCTIONS    Below are important information to help you navigate your healthcare needs and help us serve you safely and effectively:  If laboratory tests and/or imaging studies were ordered, remember to go get them done.  If new prescriptions or refills were sent to the pharmacy, remember to go pick them up.  Take your medications exactly as prescribed unless instructed otherwise.  Follow up with your primary care provider and/or specialists as scheduled.  If there are significant findings from your lab tests and imaging studies, I will notify you with explanations via [employer] or phone call, otherwise you can view them on Robert Applebaum MD and let me know if you have any questions.  Sign up for Robert Applebaum MD if you have not already done so, in order to have access to the results of your lab tests and imaging studies, and to be able to send and receive messages from me.  Note that Robert Applebaum MD messaging is for non-urgent matters that do not require immediate attention and are typically read only during office hours, so do not expect immediate responses from me.

## 2022-06-10 ENCOUNTER — OFFICE VISIT (OUTPATIENT)
Dept: RHEUMATOLOGY | Facility: MEDICAL CENTER | Age: 62
End: 2022-06-10
Payer: COMMERCIAL

## 2022-06-10 VITALS
DIASTOLIC BLOOD PRESSURE: 80 MMHG | HEART RATE: 82 BPM | TEMPERATURE: 98.2 F | RESPIRATION RATE: 16 BRPM | BODY MASS INDEX: 29.91 KG/M2 | SYSTOLIC BLOOD PRESSURE: 104 MMHG | HEIGHT: 61 IN | WEIGHT: 158.4 LBS | OXYGEN SATURATION: 93 %

## 2022-06-10 DIAGNOSIS — M05.9 SEROPOSITIVE RHEUMATOID ARTHRITIS (HCC): ICD-10-CM

## 2022-06-10 DIAGNOSIS — Z79.899 HIGH RISK MEDICATION USE: ICD-10-CM

## 2022-06-10 DIAGNOSIS — M15.9 PRIMARY OSTEOARTHRITIS INVOLVING MULTIPLE JOINTS: ICD-10-CM

## 2022-06-10 DIAGNOSIS — R76.8 POSITIVE ANA (ANTINUCLEAR ANTIBODY): ICD-10-CM

## 2022-06-10 PROBLEM — M15.0 PRIMARY OSTEOARTHRITIS INVOLVING MULTIPLE JOINTS: Status: ACTIVE | Noted: 2022-06-10

## 2022-06-10 PROCEDURE — 99214 OFFICE O/P EST MOD 30 MIN: CPT | Performed by: STUDENT IN AN ORGANIZED HEALTH CARE EDUCATION/TRAINING PROGRAM

## 2022-06-10 RX ORDER — METHOTREXATE 2.5 MG/1
TABLET ORAL
COMMUNITY
Start: 2022-06-09 | End: 2022-06-10 | Stop reason: DRUGHIGH

## 2022-06-10 RX ORDER — FOLIC ACID 1 MG/1
1 TABLET ORAL DAILY
COMMUNITY
Start: 2022-05-01 | End: 2023-03-21

## 2022-06-10 RX ORDER — PREDNISONE 10 MG/1
TABLET ORAL
Qty: 74 TABLET | Refills: 0 | Status: SHIPPED | OUTPATIENT
Start: 2022-06-10 | End: 2022-07-22

## 2022-06-10 ASSESSMENT — PAIN SCALES - GENERAL: PAINLEVEL: 10=SEVERE PAIN

## 2022-06-10 ASSESSMENT — FIBROSIS 4 INDEX: FIB4 SCORE: 0.66

## 2022-07-30 ENCOUNTER — HOSPITAL ENCOUNTER (OUTPATIENT)
Dept: LAB | Facility: MEDICAL CENTER | Age: 62
End: 2022-07-30
Attending: STUDENT IN AN ORGANIZED HEALTH CARE EDUCATION/TRAINING PROGRAM
Payer: COMMERCIAL

## 2022-07-30 LAB
ALBUMIN SERPL BCP-MCNC: 4.1 G/DL (ref 3.2–4.9)
ALBUMIN/GLOB SERPL: 1.6 G/DL
ALP SERPL-CCNC: 61 U/L (ref 30–99)
ALT SERPL-CCNC: 22 U/L (ref 2–50)
ANION GAP SERPL CALC-SCNC: 9 MMOL/L (ref 7–16)
AST SERPL-CCNC: 18 U/L (ref 12–45)
BASOPHILS # BLD AUTO: 0.6 % (ref 0–1.8)
BASOPHILS # BLD: 0.05 K/UL (ref 0–0.12)
BILIRUB SERPL-MCNC: 0.7 MG/DL (ref 0.1–1.5)
BUN SERPL-MCNC: 11 MG/DL (ref 8–22)
CALCIUM SERPL-MCNC: 8.9 MG/DL (ref 8.5–10.5)
CHLORIDE SERPL-SCNC: 106 MMOL/L (ref 96–112)
CO2 SERPL-SCNC: 22 MMOL/L (ref 20–33)
CREAT SERPL-MCNC: 0.98 MG/DL (ref 0.5–1.4)
CRP SERPL HS-MCNC: 0.74 MG/DL (ref 0–0.75)
EOSINOPHIL # BLD AUTO: 0.22 K/UL (ref 0–0.51)
EOSINOPHIL NFR BLD: 2.5 % (ref 0–6.9)
ERYTHROCYTE [DISTWIDTH] IN BLOOD BY AUTOMATED COUNT: 48.5 FL (ref 35.9–50)
ERYTHROCYTE [SEDIMENTATION RATE] IN BLOOD BY WESTERGREN METHOD: 4 MM/HOUR (ref 0–25)
GFR SERPLBLD CREATININE-BSD FMLA CKD-EPI: 66 ML/MIN/1.73 M 2
GLOBULIN SER CALC-MCNC: 2.6 G/DL (ref 1.9–3.5)
GLUCOSE SERPL-MCNC: 87 MG/DL (ref 65–99)
HCT VFR BLD AUTO: 46.4 % (ref 37–47)
HGB BLD-MCNC: 15.5 G/DL (ref 12–16)
IMM GRANULOCYTES # BLD AUTO: 0.04 K/UL (ref 0–0.11)
IMM GRANULOCYTES NFR BLD AUTO: 0.4 % (ref 0–0.9)
LYMPHOCYTES # BLD AUTO: 2.39 K/UL (ref 1–4.8)
LYMPHOCYTES NFR BLD: 26.7 % (ref 22–41)
MCH RBC QN AUTO: 33.5 PG (ref 27–33)
MCHC RBC AUTO-ENTMCNC: 33.4 G/DL (ref 33.6–35)
MCV RBC AUTO: 100.2 FL (ref 81.4–97.8)
MONOCYTES # BLD AUTO: 0.63 K/UL (ref 0–0.85)
MONOCYTES NFR BLD AUTO: 7 % (ref 0–13.4)
NEUTROPHILS # BLD AUTO: 5.61 K/UL (ref 2–7.15)
NEUTROPHILS NFR BLD: 62.8 % (ref 44–72)
NRBC # BLD AUTO: 0 K/UL
NRBC BLD-RTO: 0 /100 WBC
PLATELET # BLD AUTO: 299 K/UL (ref 164–446)
PMV BLD AUTO: 10.2 FL (ref 9–12.9)
POTASSIUM SERPL-SCNC: 4.6 MMOL/L (ref 3.6–5.5)
PROT SERPL-MCNC: 6.7 G/DL (ref 6–8.2)
RBC # BLD AUTO: 4.63 M/UL (ref 4.2–5.4)
SODIUM SERPL-SCNC: 137 MMOL/L (ref 135–145)
WBC # BLD AUTO: 8.9 K/UL (ref 4.8–10.8)

## 2022-07-30 PROCEDURE — 85025 COMPLETE CBC W/AUTO DIFF WBC: CPT

## 2022-07-30 PROCEDURE — 80053 COMPREHEN METABOLIC PANEL: CPT

## 2022-07-30 PROCEDURE — 86140 C-REACTIVE PROTEIN: CPT

## 2022-07-30 PROCEDURE — 83516 IMMUNOASSAY NONANTIBODY: CPT | Mod: 91

## 2022-07-30 PROCEDURE — 36415 COLL VENOUS BLD VENIPUNCTURE: CPT

## 2022-07-30 PROCEDURE — 85652 RBC SED RATE AUTOMATED: CPT

## 2022-08-02 LAB — CHROMATIN IGG SERPL-ACNC: 81 UNITS (ref 0–19)

## 2022-08-08 LAB — HISTONE IGG SER IA-ACNC: 0.1 UNITS (ref 0–0.9)

## 2022-08-19 ENCOUNTER — OFFICE VISIT (OUTPATIENT)
Dept: RHEUMATOLOGY | Facility: MEDICAL CENTER | Age: 62
End: 2022-08-19
Attending: STUDENT IN AN ORGANIZED HEALTH CARE EDUCATION/TRAINING PROGRAM
Payer: COMMERCIAL

## 2022-08-19 VITALS
BODY MASS INDEX: 30.43 KG/M2 | HEIGHT: 61 IN | DIASTOLIC BLOOD PRESSURE: 82 MMHG | TEMPERATURE: 98.1 F | OXYGEN SATURATION: 92 % | HEART RATE: 77 BPM | SYSTOLIC BLOOD PRESSURE: 122 MMHG | RESPIRATION RATE: 16 BRPM | WEIGHT: 161.2 LBS

## 2022-08-19 DIAGNOSIS — R76.8 POSITIVE ANA (ANTINUCLEAR ANTIBODY): ICD-10-CM

## 2022-08-19 DIAGNOSIS — E55.9 VITAMIN D DEFICIENCY: ICD-10-CM

## 2022-08-19 DIAGNOSIS — L30.8 PSORIASIFORM ERUPTION: ICD-10-CM

## 2022-08-19 DIAGNOSIS — M05.9 SEROPOSITIVE RHEUMATOID ARTHRITIS (HCC): ICD-10-CM

## 2022-08-19 DIAGNOSIS — Z79.899 HIGH RISK MEDICATION USE: ICD-10-CM

## 2022-08-19 DIAGNOSIS — M15.9 PRIMARY OSTEOARTHRITIS INVOLVING MULTIPLE JOINTS: ICD-10-CM

## 2022-08-19 PROCEDURE — 99214 OFFICE O/P EST MOD 30 MIN: CPT | Performed by: STUDENT IN AN ORGANIZED HEALTH CARE EDUCATION/TRAINING PROGRAM

## 2022-08-19 PROCEDURE — 99212 OFFICE O/P EST SF 10 MIN: CPT | Performed by: STUDENT IN AN ORGANIZED HEALTH CARE EDUCATION/TRAINING PROGRAM

## 2022-08-19 ASSESSMENT — FIBROSIS 4 INDEX: FIB4 SCORE: 0.77

## 2022-08-19 ASSESSMENT — PAIN SCALES - GENERAL: PAINLEVEL: 7=MODERATE-SEVERE PAIN

## 2022-08-19 NOTE — PATIENT INSTRUCTIONS
AFTER VISIT INSTRUCTIONS    Below are important information to help you navigate your healthcare needs and help us serve you safely and effectively:  If laboratory tests and/or imaging studies were ordered, remember to go get them done.  If new prescriptions or refills were sent to the pharmacy, remember to go pick them up.  Take your medications exactly as prescribed unless instructed otherwise.  If there are significant findings on your lab tests and imaging studies that warrant further action, I will notify you with explanations via [employer] or phone call, otherwise you can view them on [employer] and let me know if you have any questions.  Sign up for [employer] if you have not already done so, in order to have access to the results of your lab tests and imaging studies, and to be able to send and receive messages from me.  Note that [employer] messages are typically read during office hours only and may take 1-7 days before a response depending on the urgency of the situation and how busy my schedule is.  In general, [employer] messaging is for non-urgent matters that do not require immediate attention, so for urgent matters that cannot wait, you are advised to go to an urgent care.

## 2022-08-19 NOTE — PROGRESS NOTES
[facility]  [address] [place] [address], [address], [place] [address]  Phone: [phone] / Fax: [phone]    RHEUMATOLOGY FOLLOW-UP VISIT NOTE      DATE OF SERVICE: 08/19/2022    PRIMARY CARE PROVIDER:  TOM Pretty  [address] [place] [address]    MEDICAL RECORD NUMBER:  [id number]    LAST CLINIC VISIT:  Visit date not found      SUBJECTIVE:     CHIEF COMPLAINT:   Chief Complaint   Patient presents with    Follow-Up     Rheumatoid Arthritis        RHEUMATOLOGIC HISTORY:  Diane Aguilar is a 61 y.o. female with pertinent history notable for seropositive rheumatoid arthritis diagnosed in 3/2022 (positive RF and ACPA), osteoarthritis of multiple joints (hands and feet with radiographic evidence in 3/2022), and Hashimoto's thyroiditis with hypothyroidism. She initially presented on 3/22/2022, reported onset of polyarthralgia in 7/2021 preceded by hypersensitivity rash from weeding at her yard. Since then she had been treated with several one-week courses of steroids and was on prednisone 2.5mg daily when evaluated. With each course of steroid she experienced improvement but flared soon after completing the treatment. In addition, she had been managing with meloxicam 7.5mg daily which she had been taken for a long time. She reported ongoing pain/swelling in her hands, wrists, and feet, as well as pain in her lower back and hips, associated with morning stiffness lasting all day and improving with activity.     Pertinent treatments history as of 8/2022: Prednisone 20mg/30 mg taper (3/2022-4/2022, 6/2022-7/2022), methotrexate 15 mg > 20 mg weekly (3/2022-present).  Pertinent labs as of 3-7/2022: Positive RF 80 and ACPA 81; positive JORGE 1:320 homogenous/cytoplasmic patterns with positive anti-chromatin 81 and negative anti-histone; positive anti-TPO and anti-TG; elevated .2 without anemia; low vitamin D of 17 (in 7/2021); normal ESR, CRP, and  CMP.    INTERVAL HISTORY:  Prednisone taper helpful, noticed more symptoms at 3 weeks afterwards, but methotrexate has been helpful.  Joint pain in her hands (MCP joints), wrists, ankles, and right lower back radiating down the leg.  Reports episodic skin itching and peeling since 2021, mildly worsening on palms since 4 weeks ago.  Steroid ointment prescribed by dermatology has not been helpful.    REVIEW OF SYSTEMS:  Except as noted in the history above, a complete review of systems with emphasis on autoimmune inflammatory conditions was otherwise negative for any significant symptoms.      ACTIVE PROBLEM LIST:  Patient Active Problem List   Diagnosis    Hashimoto's thyroiditis    Vitamin D deficiency    Postmenopausal    Tobacco dependence due to cigarettes    Rash    Cellulitis of right upper extremity    Swelling of both hands    Seropositive rheumatoid arthritis (positive RF and ACPA)    High risk medication use    Positive JORGE (anti-chromatin)    Primary osteoarthritis involving multiple joints (hands and feet)   No problem-specific Assessment & Plan notes found for this encounter.      PAST MEDICAL HISTORY:  Past Medical History:   Diagnosis Date    Seropositive rheumatoid arthritis (HCC) 3/22/2022    Thyroid disease     Tobacco dependence due to cigarettes        PAST SURGICAL HISTORY:  History reviewed. No pertinent surgical history.    MEDICATIONS:  Current Outpatient Medications   Medication Sig Dispense Refill    folic acid (FOLVITE) 1 MG Tab Take 1 mg by mouth every day. for 30 days      methotrexate 2.5 MG Tab Take 8 Tablets by mouth every 7 days. 96 Tablet 3    halobetasol (ULTRAVATE) 0.05 % cream Apply  topically 2 times a day. Times 2 weeks      cetirizine (ZYRTEC) 10 MG Tab Take 4 tablets at night for 1 to 2 weeks then 1 tablet nightly for itching 30 Tablet 2    VITAMIN D PO Take  by mouth.      Calcium Carbonate (CALCIUM 500 PO) Take  by mouth.      levothyroxine (SYNTHROID) 112 MCG Tab Take 1  "tablet by mouth every morning on an empty stomach. 90 tablet 3    estradiol-norethindrone (ACTIVELLA) 1-0.5 MG per tablet Take 1 tablet by mouth every day.       No current facility-administered medications for this visit.       ALLERGIES:   Allergies   Allergen Reactions    Codeine     Pcn [Penicillins]        IMMUNIZATIONS:  Immunization History   Administered Date(s) Administered    Influenza Seasonal Injectable - Historical Data 12/18/2012    Influenza Vac Subunit Quad Inj (Pf) 10/15/2018    Influenza Vaccine Quad Inj (Pf) 10/07/2017, 10/18/2019    Influenza Vaccine Quad Recombinant 10/06/2020, 11/18/2021    MODERNA SARS-COV-2 VACCINE (12+) 06/04/2021, 07/02/2021    Zoster Vaccine Live (ZVL) (Zostavax) - HISTORICAL DATA 09/20/2016       SOCIAL HISTORY:   Social History     Tobacco Use    Smoking status: Every Day     Packs/day: 1.00     Years: 23.00     Pack years: 23.00     Types: Cigarettes    Smokeless tobacco: Never   Vaping Use    Vaping Use: Never used   Substance Use Topics    Alcohol use: Yes     Comment: occ    Drug use: Never       FAMILY HISTORY:  Family History   Problem Relation Age of Onset    Other Father         ALS    Lung Disease Paternal Grandmother         OBJECTIVE:     Vital Signs: /82 (BP Location: Right arm, Patient Position: Sitting, BP Cuff Size: Adult)   Pulse 77   Temp 36.7 °C (98.1 °F) (Temporal)   Resp 16   Ht 1.549 m (5' 1\")   Wt 73.1 kg (161 lb 3.2 oz)   SpO2 92% Body mass index is 30.46 kg/m².    General: Appears well and comfortable  Eyes: No scleral or conjunctival lesions  ENT: No apparent oral or nasal lesions  Head/Neck: No apparent scalp or neck lesions  Cardiovascular: Regular rate and rhythm  Respiratory: Breathing quiet and unlabored  Gastrointestinal: No organomegaly or abdominal masses  Integumentary: Multiple dry psoriasiform rashes mostly on palms with mild peeling  Musculoskeletal: Poorly localized mild tenderness of hands (on MCP squeeze), wrists, and " ankles; no definite periarticular soft tissue swelling or warmth; no significant restriction in range of motion of joints examined  Neurologic: No focal sensory or motor deficits  Psychiatric: Mood and affect appropriate      LABORATORY RESULTS REVIEWED AND INTERPRETED BY ME:  Lab Results   Component Value Date/Time    SEDRATEWES 4 07/30/2022 09:21 AM    CREACTPROT 0.74 07/30/2022 09:21 AM     Lab Results   Component Value Date/Time    RHEUMFACTN 80 (H) 03/08/2022 02:32 PM    CCPANTIBODY 81 (H) 03/23/2022 09:46 AM     Lab Results   Component Value Date/Time    ANTINUCAB Detected (A) 03/23/2022 09:46 AM     Lab Results   Component Value Date/Time    ANTIDNADS 15 03/23/2022 09:46 AM    SMITHAB 3 03/23/2022 09:46 AM    RNPAB 7 03/23/2022 09:46 AM    HENEOGZ25 9 03/23/2022 09:46 AM    SSA60 3 03/23/2022 09:46 AM    SSA52 1 03/23/2022 09:46 AM    ANTISSBSJ 0 03/23/2022 09:46 AM    JO1AB 1 03/23/2022 09:46 AM     Lab Results   Component Value Date/Time    MICROSOMALA 51.2 (H) 07/24/2021 09:09 AM    ANTITHYROGL 286.8 (H) 07/24/2021 09:09 AM    TSHULTRASEN 0.630 07/24/2021 09:09 AM     Lab Results   Component Value Date/Time    ASTSGOT 18 07/30/2022 09:21 AM    ALTSGPT 22 07/30/2022 09:21 AM    ALKPHOSPHAT 61 07/30/2022 09:21 AM    TBILIRUBIN 0.7 07/30/2022 09:21 AM    TOTPROTEIN 6.7 07/30/2022 09:21 AM    ALBUMIN 4.1 07/30/2022 09:21 AM     Lab Results   Component Value Date/Time    SODIUM 137 07/30/2022 09:21 AM    POTASSIUM 4.6 07/30/2022 09:21 AM    CHLORIDE 106 07/30/2022 09:21 AM    CO2 22 07/30/2022 09:21 AM    GLUCOSE 87 07/30/2022 09:21 AM    BUN 11 07/30/2022 09:21 AM    CREATININE 0.98 07/30/2022 09:21 AM    CALCIUM 8.9 07/30/2022 09:21 AM     Lab Results   Component Value Date/Time    WBC 8.9 07/30/2022 09:21 AM    RBC 4.63 07/30/2022 09:21 AM    HEMOGLOBIN 15.5 07/30/2022 09:21 AM    HEMATOCRIT 46.4 07/30/2022 09:21 AM    .2 (H) 07/30/2022 09:21 AM    MCH 33.5 (H) 07/30/2022 09:21 AM    MCHC 33.4 (L)  07/30/2022 09:21 AM    RDW 48.5 07/30/2022 09:21 AM    PLATELETCT 299 07/30/2022 09:21 AM    MPV 10.2 07/30/2022 09:21 AM    NEUTS 5.61 07/30/2022 09:21 AM    LYMPHOCYTES 26.70 07/30/2022 09:21 AM    MONOCYTES 7.00 07/30/2022 09:21 AM    EOSINOPHILS 2.50 07/30/2022 09:21 AM    BASOPHILS 0.60 07/30/2022 09:21 AM     Lab Results   Component Value Date/Time    25HYDROXY 17 (L) 07/24/2021 09:09 AM     Lab Results   Component Value Date/Time    CHOLSTRLTOT 180 07/24/2021 09:09 AM    LDL 85 07/24/2021 09:09 AM    HDL 76 07/24/2021 09:09 AM    TRIGLYCERIDE 96 07/24/2021 09:09 AM       RADIOLOGY RESULTS REVIEWED AND INTERPRETED BY ME:  Results for orders placed in visit on 03/23/22    DX-JOINT SURVEY-FEET SINGLE VIEW    Impression  1.  No evidence for inflammatory arthritis    2.  Postoperative changes of the right 5th proximal phalanx in the left 1st metatarsal head    3.  osteoarthritis of both 1st metatarsophalangeal joint    4.  Right accessory navicular, anatomic variant    Results for orders placed in visit on 03/23/22    DX-JOINT SURVEY-HANDS SINGLE VIEW    Impression  1.  No evidence for erosive arthritis    2.  Typical osteoarthritis of bilateral 1st carpometacarpal joint and distal interphalangeal joint      All relevant laboratory and imaging results reported on this note were reviewed and interpreted by me.      ASSESSMENT AND PLAN:     Diane Aguilar is a 61 y.o. female with history as noted above whose presentation merits the following diagnostic and clinical status impressions and recommendations:    1. Seropositive rheumatoid arthritis (positive RF and ACPA)  Clinical picture suggests relatively low-to-moderate disease activity on the current regimen methotrexate monotherapy, so no need for escalation of treatment at this time. That said, given the possibility of discordance between immunologic activity and clinical disease manifestations, would need to routinely check laboratory markers of disease activity for  complete assessment.  - Continue methotrexate 20 mg oral daily with increased dose of folic 2 mg daily  - Consider augmentation of treatment with hydroxychloroquine and/or sulfasalazine if needed    2. Primary osteoarthritis involving multiple joints (hands and feet)  Presumably a significant contributor to her overall joint pain burden which can be managed with topical and/or oral NSAIDs and analgesics.  - Consider intra-articular steroid injection if needed    3. Psoriasiform eruption  Raises concern for possible underlying psoriasis with psoriatic arthritis in addition to her rheumatoid arthritis.  - HLA-B27    4. Vitamin D deficiency  Hypovitaminosis D can contribute to diffuse musculoskeletal aches, fatigue, and general feeling of unwellness.  - VITAMIN D,25 HYDROXY    5. Positive JORGE (anti-chromatin)  Raises concern for possible evolving drug-induced lupus, though no reported history of any culprit medication.  - Consider addition of hydroxychloroquine especially given the psoriasiform rash of unclear etiology    6. High risk medication use  Mildly elevated MCV without anemia may be a side effect of methotrexate for which increasing folic acid from 1 mg to 2 mg would be helpful.  - Need routine monitoring labs every 3-4 months  - Need to confirm/address age-appropriate vaccines      FOLLOW-UP: Return in about 3 months (around 11/19/2022) for Short.           Thank you for giving me the opportunity to participate in the care of Diane Aguilar.    Josr Mayer MD, MS  Rheumatologist, [facility]  , Department of Internal Medicine  [facility]

## 2022-10-25 DIAGNOSIS — E03.9 HYPOTHYROIDISM, UNSPECIFIED TYPE: ICD-10-CM

## 2022-10-25 RX ORDER — LEVOTHYROXINE SODIUM 112 UG/1
112 TABLET ORAL
Qty: 90 TABLET | Refills: 0 | Status: CANCELLED | OUTPATIENT
Start: 2022-10-25

## 2022-10-26 ENCOUNTER — TELEPHONE (OUTPATIENT)
Dept: URGENT CARE | Facility: PHYSICIAN GROUP | Age: 62
End: 2022-10-26
Payer: COMMERCIAL

## 2022-10-26 ENCOUNTER — HOSPITAL ENCOUNTER (OUTPATIENT)
Dept: LAB | Facility: MEDICAL CENTER | Age: 62
End: 2022-10-26
Attending: NURSE PRACTITIONER
Payer: COMMERCIAL

## 2022-10-26 ENCOUNTER — HOSPITAL ENCOUNTER (OUTPATIENT)
Dept: LAB | Facility: MEDICAL CENTER | Age: 62
End: 2022-10-26
Attending: STUDENT IN AN ORGANIZED HEALTH CARE EDUCATION/TRAINING PROGRAM
Payer: COMMERCIAL

## 2022-10-26 DIAGNOSIS — R21 RASH: ICD-10-CM

## 2022-10-26 DIAGNOSIS — M79.89 SWELLING OF BOTH HANDS: ICD-10-CM

## 2022-10-26 LAB
25(OH)D3 SERPL-MCNC: 26 NG/ML (ref 30–100)
T PALLIDUM AB SER QL IA: NORMAL

## 2022-10-26 PROCEDURE — 36415 COLL VENOUS BLD VENIPUNCTURE: CPT

## 2022-10-26 PROCEDURE — 86812 HLA TYPING A B OR C: CPT

## 2022-10-26 PROCEDURE — 82306 VITAMIN D 25 HYDROXY: CPT

## 2022-10-26 PROCEDURE — 86780 TREPONEMA PALLIDUM: CPT

## 2022-10-27 DIAGNOSIS — E06.3 HASHIMOTO'S THYROIDITIS: ICD-10-CM

## 2022-10-27 DIAGNOSIS — Z13.6 SCREENING FOR CARDIOVASCULAR CONDITION: ICD-10-CM

## 2022-10-27 DIAGNOSIS — E55.9 VITAMIN D DEFICIENCY: ICD-10-CM

## 2022-10-27 DIAGNOSIS — F17.210 TOBACCO DEPENDENCE DUE TO CIGARETTES: ICD-10-CM

## 2022-10-28 LAB — HLA-B27 QL FC: NEGATIVE

## 2022-11-04 ENCOUNTER — OFFICE VISIT (OUTPATIENT)
Dept: RHEUMATOLOGY | Facility: MEDICAL CENTER | Age: 62
End: 2022-11-04
Attending: STUDENT IN AN ORGANIZED HEALTH CARE EDUCATION/TRAINING PROGRAM
Payer: COMMERCIAL

## 2022-11-04 VITALS
HEIGHT: 61 IN | TEMPERATURE: 98.4 F | RESPIRATION RATE: 16 BRPM | BODY MASS INDEX: 30.21 KG/M2 | SYSTOLIC BLOOD PRESSURE: 104 MMHG | DIASTOLIC BLOOD PRESSURE: 72 MMHG | WEIGHT: 160 LBS | OXYGEN SATURATION: 96 % | HEART RATE: 116 BPM

## 2022-11-04 DIAGNOSIS — M05.9 SEROPOSITIVE RHEUMATOID ARTHRITIS (HCC): ICD-10-CM

## 2022-11-04 DIAGNOSIS — Z79.60 LONG-TERM USE OF IMMUNOSUPPRESSANT MEDICATION: ICD-10-CM

## 2022-11-04 DIAGNOSIS — M15.9 PRIMARY OSTEOARTHRITIS INVOLVING MULTIPLE JOINTS: ICD-10-CM

## 2022-11-04 DIAGNOSIS — E55.9 VITAMIN D DEFICIENCY: ICD-10-CM

## 2022-11-04 PROCEDURE — 99214 OFFICE O/P EST MOD 30 MIN: CPT | Performed by: STUDENT IN AN ORGANIZED HEALTH CARE EDUCATION/TRAINING PROGRAM

## 2022-11-04 PROCEDURE — 99211 OFF/OP EST MAY X REQ PHY/QHP: CPT | Performed by: STUDENT IN AN ORGANIZED HEALTH CARE EDUCATION/TRAINING PROGRAM

## 2022-11-04 RX ORDER — PREDNISONE 10 MG/1
TABLET ORAL
Qty: 74 TABLET | Refills: 0 | Status: SHIPPED | OUTPATIENT
Start: 2022-11-04 | End: 2022-12-16

## 2022-11-04 RX ORDER — LEFLUNOMIDE 20 MG/1
TABLET ORAL
Qty: 105 TABLET | Refills: 0 | Status: SHIPPED | OUTPATIENT
Start: 2022-11-04 | End: 2022-12-07 | Stop reason: SDUPTHER

## 2022-11-04 ASSESSMENT — FIBROSIS 4 INDEX: FIB4 SCORE: 0.78

## 2022-11-04 NOTE — PROGRESS NOTES
[facility] RHEUMATOLOGY  [address], [address], [place] [address]  Phone: [phone] ? Fax: [phone]    RHEUMATOLOGY FOLLOW-UP VISIT NOTE      DATE OF SERVICE: 11/04/2022         Subjective     PRIMARY CARE PROVIDER:  TOM [facility] [address] [place] [address]    PATIENT IDENTIFICATION:  Diane [facility] [id number]    YOB: 1960    MEDICAL RECORD NUMBER: [id number]          CHIEF COMPLAINT:   Chief Complaint   Patient presents with    Follow-Up     Rheumatoid arthritis        RHEUMATOLOGIC HISTORY:  Diane Aguilar is a 62 y.o. female with pertinent history notable for seropositive rheumatoid arthritis diagnosed in 3/2022, osteoarthritis of multiple joints (hands and feet), and Hashimoto's thyroiditis with hypothyroidism. She initially presented on 3/22/2022, reported onset of polyarthralgia in 7/2021 preceded by hypersensitivity rash from weeding at her yard. Since then she had been treated with several one-week courses of steroids and was on prednisone 2.5mg daily when evaluated. With each course of steroid she experienced improvement but flared soon after completing the treatment. In addition, she has been managing with meloxicam 7.5mg daily which she had been taken for a long time. She reported ongoing pain/swelling in her hands, wrists, and feet, as well as pain in her lower back and hips, associated with morning stiffness lasting all day and improving with activity.     Pertinent treatment history as of 11/2022: Methotrexate 15-20 mg weekly (3/2022-11/2022, ineffective), prednisone 20-30 mg tapers (3/2022-4/2022, 11/2022-12/2022, effective).    Pertinent labs as of 10/2022: Positive RF 80, anti-CCP 81, JORGE 1:320 homogenous/cytoplasmic patterns with negative reflex panel (in 3/2022); positive anti-TPO 51.2 and anti-.8 (in 7/2021); elevated .2 with normal Hgb; low vitamin D of 26; negative/normal HLA-B27, ESR, CRP, and  CMP.    Pertinent imaging as of 3/23/2022 XRs: Hands with typical osteoarthritis of bilateral 1st carpometacarpal joint and distal interphalangeal joint. Feet with postoperative changes of the right 5th proximal phalanx in the left 1st metatarsal head, osteoarthritis of both 1st metatarsophalangeal joint, and right accessory navicular, anatomic variant.    INTERVAL HISTORY:  Since 10/16/22, significant joint pain/stiffness in hands/wrists with swelling, right shoulder, right knee, ankles and feet.  Methotrexate has not been helpful despite the higher dose of 20 mg since last visit.  Meloxicam borrowed from her  has been helpful.    REVIEW OF SYSTEMS:  Except as noted in the history above, relevant review of systems with emphasis on autoimmune inflammatory processes was otherwise negative.      ACTIVE PROBLEM LIST:  Patient Active Problem List    Diagnosis Date Noted    Primary osteoarthritis involving multiple joints (hands and feet) 06/10/2022    Seropositive rheumatoid arthritis (positive RF and ACPA) 03/22/2022    Long-term use of immunosuppressant medication 03/22/2022    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    MEDICATIONS:  Current Outpatient Medications   Medication Sig Dispense Refill    leflunomide (ARAVA) 20 MG Tab Take 5 tablets (100 mg) daily for 3 days, then 1 tablet (20 mg) daily thereafter. 105 Tablet 0    predniSONE (DELTASONE) 10 MG Tab Take 3 Tablets by mouth every day for 7  days, THEN 2.5 Tablets every day for 7 days, THEN 2 Tablets every day for 7 days, THEN 1.5 Tablets every day for 7 days, THEN 1 Tablet every day for 7 days, THEN 0.5 Tablets every day for 7 days. Take with food. 74 Tablet 0    levothyroxine (SYNTHROID) 112 MCG Tab Take 1 Tablet by mouth every morning on an empty stomach. Take 1 tablet by mouth every morning on an empty stomach. DUE FOR THYROID LABS prior to additional refills 90 Tablet 0    folic acid (FOLVITE) 1 MG Tab Take 1 mg by mouth every day. for 30 days      methotrexate 2.5 MG Tab Take 8 Tablets by mouth every 7 days. 96 Tablet 3    halobetasol (ULTRAVATE) 0.05 % cream Apply  topically 2 times a day. Times 2 weeks      cetirizine (ZYRTEC) 10 MG Tab Take 4 tablets at night for 1 to 2 weeks then 1 tablet nightly for itching 30 Tablet 2    VITAMIN D PO Take  by mouth.      Calcium Carbonate (CALCIUM 500 PO) Take  by mouth.      estradiol-norethindrone (ACTIVELLA) 1-0.5 MG per tablet Take 1 tablet by mouth every day.       No current facility-administered medications for this visit.       ALLERGIES:   Allergies   Allergen Reactions    Codeine     Pcn [Penicillins]        IMMUNIZATIONS:  Immunization History   Administered Date(s) Administered    Influenza Seasonal Injectable - Historical Data 12/18/2012    Influenza Vac Subunit Quad Inj (Pf) 10/15/2018    Influenza Vaccine Quad Inj (Pf) 10/07/2017, 10/18/2019    Influenza Vaccine Quad Recombinant 10/06/2020, 11/18/2021    MODERNA SARS-COV-2 VACCINE (12+) 06/04/2021, 07/02/2021    Zoster Vaccine Live (ZVL) (Zostavax) - HISTORICAL DATA 09/20/2016       SOCIAL HISTORY:   Social History     Socioeconomic History    Marital status:    Tobacco Use    Smoking status: Every Day     Packs/day: 1.00     Years: 23.00     Pack years: 23.00     Types: Cigarettes    Smokeless tobacco: Never   Vaping Use    Vaping Use: Never used   Substance and Sexual Activity    Alcohol use: Yes     Comment: occ    Drug use: Never     "Sexual activity: Yes     Partners: Male     Birth control/protection: Post-Menopausal       FAMILY HISTORY:  Family History   Problem Relation Age of Onset    Other Father         ALS    Lung Disease Paternal Grandmother             Objective     Vital Signs: /72 (BP Location: Left arm, Patient Position: Sitting, BP Cuff Size: Adult)   Pulse (!) 116   Temp 36.9 °C (98.4 °F) (Temporal)   Resp 16   Ht 1.549 m (5' 1\")   Wt 72.6 kg (160 lb)   SpO2 96% Body mass index is 30.23 kg/m².    General: Appears uncomfortable due to joint pain  Eyes: No scleral or conjunctival lesions  ENT: No apparent oral or nasal lesions  Head/Neck: No apparent scalp or neck lesions  Cardiovascular: Regular rate and rhythm  Respiratory: Breathing quiet and unlabored  Gastrointestinal: No organomegaly or abdominal masses  Integumentary: No significant cutaneous lesions or discolorations  Musculoskeletal: Moderate tenderness of hands (with synovial swelling across MCP joints), wrists (with synovial swelling), shoulders AC joints, knees medial joint line, ankles and feet (on MTP squeeze); overall range of motion limited by pain  Neurologic: No focal sensory or motor deficits  Psychiatric: Mood and affect appropriate      LABORATORY RESULTS REVIEWED AND INTERPRETED BY ME:  Lab Results   Component Value Date/Time    SEDRATEWES 4 07/30/2022 09:21 AM    CREACTPROT 0.74 07/30/2022 09:21 AM     Lab Results   Component Value Date/Time    RHEUMFACTN 80 (H) 03/08/2022 02:32 PM    CCPANTIBODY 81 (H) 03/23/2022 09:46 AM    DRAC19YAES Negative 10/26/2022 03:55 PM     Lab Results   Component Value Date/Time    ANTINUCAB Detected (A) 03/23/2022 09:46 AM     Lab Results   Component Value Date/Time    ANTIDNADS 15 03/23/2022 09:46 AM    SMITHAB 3 03/23/2022 09:46 AM    RNPAB 7 03/23/2022 09:46 AM    PQVADVW72 9 03/23/2022 09:46 AM    SSA60 3 03/23/2022 09:46 AM    SSA52 1 03/23/2022 09:46 AM    ANTISSBSJ 0 03/23/2022 09:46 AM    JO1AB 1 03/23/2022 " 09:46 AM     Lab Results   Component Value Date/Time    MICROSOMALA 51.2 (H) 07/24/2021 09:09 AM    ANTITHYROGL 286.8 (H) 07/24/2021 09:09 AM    TSHULTRASEN 0.630 07/24/2021 09:09 AM     Lab Results   Component Value Date/Time    ASTSGOT 18 07/30/2022 09:21 AM    ALTSGPT 22 07/30/2022 09:21 AM    ALKPHOSPHAT 61 07/30/2022 09:21 AM    TBILIRUBIN 0.7 07/30/2022 09:21 AM    TOTPROTEIN 6.7 07/30/2022 09:21 AM    ALBUMIN 4.1 07/30/2022 09:21 AM     Lab Results   Component Value Date/Time    SODIUM 137 07/30/2022 09:21 AM    POTASSIUM 4.6 07/30/2022 09:21 AM    CHLORIDE 106 07/30/2022 09:21 AM    CO2 22 07/30/2022 09:21 AM    GLUCOSE 87 07/30/2022 09:21 AM    BUN 11 07/30/2022 09:21 AM    CREATININE 0.98 07/30/2022 09:21 AM    CALCIUM 8.9 07/30/2022 09:21 AM     Lab Results   Component Value Date/Time    WBC 8.9 07/30/2022 09:21 AM    RBC 4.63 07/30/2022 09:21 AM    HEMOGLOBIN 15.5 07/30/2022 09:21 AM    HEMATOCRIT 46.4 07/30/2022 09:21 AM    .2 (H) 07/30/2022 09:21 AM    MCH 33.5 (H) 07/30/2022 09:21 AM    MCHC 33.4 (L) 07/30/2022 09:21 AM    RDW 48.5 07/30/2022 09:21 AM    PLATELETCT 299 07/30/2022 09:21 AM    MPV 10.2 07/30/2022 09:21 AM    NEUTS 5.61 07/30/2022 09:21 AM    LYMPHOCYTES 26.70 07/30/2022 09:21 AM    MONOCYTES 7.00 07/30/2022 09:21 AM    EOSINOPHILS 2.50 07/30/2022 09:21 AM    BASOPHILS 0.60 07/30/2022 09:21 AM     Lab Results   Component Value Date/Time    25HYDROXY 26 (L) 10/26/2022 03:55 PM     Lab Results   Component Value Date/Time    CHOLSTRLTOT 180 07/24/2021 09:09 AM    LDL 85 07/24/2021 09:09 AM    HDL 76 07/24/2021 09:09 AM    TRIGLYCERIDE 96 07/24/2021 09:09 AM       RADIOLOGY RESULTS REVIEWED AND INTERPRETED BY ME:  Results for orders placed in visit on 03/23/22    DX-JOINT SURVEY-FEET SINGLE VIEW    Impression  1.  No evidence for inflammatory arthritis    2.  Postoperative changes of the right 5th proximal phalanx in the left 1st metatarsal head    3.  osteoarthritis of both 1st  metatarsophalangeal joint    4.  Right accessory navicular, anatomic variant    Results for orders placed in visit on 03/23/22    DX-JOINT SURVEY-HANDS SINGLE VIEW    Impression  1.  No evidence for erosive arthritis    2.  Typical osteoarthritis of bilateral 1st carpometacarpal joint and distal interphalangeal joint      All relevant laboratory and imaging results reported on this note were reviewed and interpreted by me.         Assessment & Plan     Diane Aguilar is a 62 y.o. female with history as noted above whose presentation merits the following diagnostic and clinical status impressions and recommendations:    1. Seropositive rheumatoid arthritis (HCC)  Clinically active disease inadequately controlled on the current regimen of methotrexate monotherapy, so needs optimization of treatment by switching to leflunomide. Given that DMARDs can take up to 6 or more weeks to exert clinical effect, need to bridge with a course of steroid taper for sooner symptom relief in the meantime.  - leflunomide (ARAVA) 20 MG Tab; Take 5 tablets (100 mg) daily for 3 days, then 1 tablet (20 mg) daily thereafter.  Dispense: 105 Tablet; Refill: 0  - predniSONE (DELTASONE) 10 MG Tab; Take 3 Tablets by mouth every day for 7 days, THEN 2.5 Tablets every day for 7 days, THEN 2 Tablets every day for 7 days, THEN 1.5 Tablets every day for 7 days, THEN 1 Tablet every day for 7 days, THEN 0.5 Tablets every day for 7 days. Take with food.  Dispense: 74 Tablet; Refill: 0  - Discontinue methotrexate  - Sed Rate; Future  - CRP QUANTITIVE (NON-CARDIAC); Future    2. Long-term use of immunosuppressant medication  Mild macrocytosis presumably a side effect of her methotrexate use, but since discontinuing methotrexate, would just monitor for recovery.  - CBC WITH DIFFERENTIAL; Future  - Comp Metabolic Panel; Future    3. Primary osteoarthritis involving multiple joints  Presumably an important contributor to overall joint pain burden which can be  managed with topical NSAIDs and analgesics.  - Voltaren 1% gel 3 times daily as needed  - Consider intra-articular steroid injection if needed    4. Vitamin D deficiency  Hypovitaminosis D can contribute to diffuse musculoskeletal aches, fatigue, and general feeling of unwellness.  - Needs ongoing repletion of vitamin D      FOLLOW-UP: Return in about 8 weeks (around 12/30/2022) for Short.         Thank you for the opportunity to participate in the care of Diane Aguilar.    Josr Mayer MD, MS  Rheumatologist, [facility] Rheumatology ? [facility]   of Clinical Medicine, Department of Internal Medicine  [facility] ? [facility]

## 2022-11-04 NOTE — PATIENT INSTRUCTIONS
AFTER VISIT INSTRUCTIONS    Below are important information to help you navigate your healthcare needs and help us serve you safely and effectively:  If laboratory tests and/or imaging studies were ordered, remember to go get them done.  If new prescriptions or refills were sent to the pharmacy, remember to go pick them up.  Take your medications exactly as prescribed unless instructed otherwise.  If there are significant findings on your lab tests and imaging studies that warrant further action, I will notify you with explanations via Smartjoghart or phone call, otherwise you can view them on Cosharedt and let me know if you have any questions.  Sign up for CloudMine if you have not already done so, in order to have access to the results of your lab tests and imaging studies, and to be able to send and receive messages from me.  Note that CloudMine messages are typically read during office hours only and may take 1-7 business days before a response depending on the urgency of the situation and how busy my clinic schedule is.  In general, CloudMine messaging is for non-urgent matters that do not require immediate attention, so for urgent matters that cannot wait, you are advised to go to an urgent care.

## 2022-11-21 DIAGNOSIS — Z12.31 ENCOUNTER FOR SCREENING MAMMOGRAM FOR BREAST CANCER: ICD-10-CM

## 2022-11-21 DIAGNOSIS — Z79.890 HORMONE REPLACEMENT THERAPY (HRT): ICD-10-CM

## 2022-11-21 RX ORDER — ESTRADIOL AND NORETHINDRONE ACETATE 1; .5 MG/1; MG/1
1 TABLET ORAL
Qty: 30 TABLET | Refills: 1 | Status: SHIPPED | OUTPATIENT
Start: 2022-11-21

## 2022-11-21 NOTE — TELEPHONE ENCOUNTER
Received request via: Patient    Was the patient seen in the last year in this department? Yes    Does the patient have an active prescription (recently filled or refills available) for medication(s) requested? No    Does the patient have [facility] and need 100 day supply (blood pressure, diabetes and cholesterol meds only)? Patient does not have SCP    Last OV 03/08/22

## 2022-11-22 NOTE — TELEPHONE ENCOUNTER
Please call patient and let her know I have given her 60-day supply of her estrogen however she needs to complete mammogram for further refills.

## 2022-12-14 ENCOUNTER — HOSPITAL ENCOUNTER (OUTPATIENT)
Dept: LAB | Facility: MEDICAL CENTER | Age: 62
End: 2022-12-14
Attending: NURSE PRACTITIONER
Payer: COMMERCIAL

## 2022-12-14 ENCOUNTER — HOSPITAL ENCOUNTER (OUTPATIENT)
Dept: LAB | Facility: MEDICAL CENTER | Age: 62
End: 2022-12-14
Attending: STUDENT IN AN ORGANIZED HEALTH CARE EDUCATION/TRAINING PROGRAM
Payer: COMMERCIAL

## 2022-12-14 DIAGNOSIS — E06.3 HASHIMOTO'S THYROIDITIS: ICD-10-CM

## 2022-12-14 DIAGNOSIS — Z79.60 LONG-TERM USE OF IMMUNOSUPPRESSANT MEDICATION: ICD-10-CM

## 2022-12-14 DIAGNOSIS — Z13.6 SCREENING FOR CARDIOVASCULAR CONDITION: ICD-10-CM

## 2022-12-14 DIAGNOSIS — F17.210 TOBACCO DEPENDENCE DUE TO CIGARETTES: ICD-10-CM

## 2022-12-14 DIAGNOSIS — E55.9 VITAMIN D DEFICIENCY: ICD-10-CM

## 2022-12-14 DIAGNOSIS — M05.9 SEROPOSITIVE RHEUMATOID ARTHRITIS (HCC): ICD-10-CM

## 2022-12-14 LAB
25(OH)D3 SERPL-MCNC: 49 NG/ML (ref 30–100)
ALBUMIN SERPL BCP-MCNC: 4.4 G/DL (ref 3.2–4.9)
ALBUMIN/GLOB SERPL: 1.6 G/DL
ALP SERPL-CCNC: 66 U/L (ref 30–99)
ALT SERPL-CCNC: 19 U/L (ref 2–50)
ANION GAP SERPL CALC-SCNC: 9 MMOL/L (ref 7–16)
AST SERPL-CCNC: 14 U/L (ref 12–45)
BASOPHILS # BLD AUTO: 0.7 % (ref 0–1.8)
BASOPHILS # BLD: 0.06 K/UL (ref 0–0.12)
BILIRUB SERPL-MCNC: 0.6 MG/DL (ref 0.1–1.5)
BUN SERPL-MCNC: 16 MG/DL (ref 8–22)
CALCIUM ALBUM COR SERPL-MCNC: 8.8 MG/DL (ref 8.5–10.5)
CALCIUM SERPL-MCNC: 9.1 MG/DL (ref 8.5–10.5)
CHLORIDE SERPL-SCNC: 105 MMOL/L (ref 96–112)
CHOLEST SERPL-MCNC: 203 MG/DL (ref 100–199)
CO2 SERPL-SCNC: 25 MMOL/L (ref 20–33)
CREAT SERPL-MCNC: 0.92 MG/DL (ref 0.5–1.4)
CRP SERPL HS-MCNC: <0.3 MG/DL (ref 0–0.75)
EOSINOPHIL # BLD AUTO: 0.07 K/UL (ref 0–0.51)
EOSINOPHIL NFR BLD: 0.8 % (ref 0–6.9)
ERYTHROCYTE [DISTWIDTH] IN BLOOD BY AUTOMATED COUNT: 51.2 FL (ref 35.9–50)
ERYTHROCYTE [SEDIMENTATION RATE] IN BLOOD BY WESTERGREN METHOD: 6 MM/HOUR (ref 0–25)
FASTING STATUS PATIENT QL REPORTED: NORMAL
GFR SERPLBLD CREATININE-BSD FMLA CKD-EPI: 71 ML/MIN/1.73 M 2
GLOBULIN SER CALC-MCNC: 2.7 G/DL (ref 1.9–3.5)
GLUCOSE SERPL-MCNC: 84 MG/DL (ref 65–99)
HCT VFR BLD AUTO: 46.6 % (ref 37–47)
HDLC SERPL-MCNC: 85 MG/DL
HGB BLD-MCNC: 15.4 G/DL (ref 12–16)
IMM GRANULOCYTES # BLD AUTO: 0.03 K/UL (ref 0–0.11)
IMM GRANULOCYTES NFR BLD AUTO: 0.3 % (ref 0–0.9)
LDLC SERPL CALC-MCNC: 104 MG/DL
LYMPHOCYTES # BLD AUTO: 2.02 K/UL (ref 1–4.8)
LYMPHOCYTES NFR BLD: 22.2 % (ref 22–41)
MCH RBC QN AUTO: 32.6 PG (ref 27–33)
MCHC RBC AUTO-ENTMCNC: 33 G/DL (ref 33.6–35)
MCV RBC AUTO: 98.7 FL (ref 81.4–97.8)
MONOCYTES # BLD AUTO: 0.7 K/UL (ref 0–0.85)
MONOCYTES NFR BLD AUTO: 7.7 % (ref 0–13.4)
NEUTROPHILS # BLD AUTO: 6.21 K/UL (ref 2–7.15)
NEUTROPHILS NFR BLD: 68.3 % (ref 44–72)
NRBC # BLD AUTO: 0 K/UL
NRBC BLD-RTO: 0 /100 WBC
PLATELET # BLD AUTO: 244 K/UL (ref 164–446)
PMV BLD AUTO: 10.9 FL (ref 9–12.9)
POTASSIUM SERPL-SCNC: 4.2 MMOL/L (ref 3.6–5.5)
PROT SERPL-MCNC: 7.1 G/DL (ref 6–8.2)
RBC # BLD AUTO: 4.72 M/UL (ref 4.2–5.4)
SODIUM SERPL-SCNC: 139 MMOL/L (ref 135–145)
TRIGL SERPL-MCNC: 72 MG/DL (ref 0–149)
TSH SERPL DL<=0.005 MIU/L-ACNC: 1.3 UIU/ML (ref 0.38–5.33)
WBC # BLD AUTO: 9.1 K/UL (ref 4.8–10.8)

## 2022-12-14 PROCEDURE — 36415 COLL VENOUS BLD VENIPUNCTURE: CPT

## 2022-12-14 PROCEDURE — 85652 RBC SED RATE AUTOMATED: CPT

## 2022-12-14 PROCEDURE — 86140 C-REACTIVE PROTEIN: CPT

## 2022-12-14 PROCEDURE — 84443 ASSAY THYROID STIM HORMONE: CPT

## 2022-12-14 PROCEDURE — 82306 VITAMIN D 25 HYDROXY: CPT

## 2022-12-14 PROCEDURE — 80053 COMPREHEN METABOLIC PANEL: CPT

## 2022-12-14 PROCEDURE — 85025 COMPLETE CBC W/AUTO DIFF WBC: CPT

## 2022-12-14 PROCEDURE — 80061 LIPID PANEL: CPT

## 2022-12-19 ENCOUNTER — OFFICE VISIT (OUTPATIENT)
Dept: RHEUMATOLOGY | Facility: MEDICAL CENTER | Age: 62
End: 2022-12-19
Attending: STUDENT IN AN ORGANIZED HEALTH CARE EDUCATION/TRAINING PROGRAM
Payer: COMMERCIAL

## 2022-12-19 VITALS
DIASTOLIC BLOOD PRESSURE: 70 MMHG | RESPIRATION RATE: 16 BRPM | SYSTOLIC BLOOD PRESSURE: 118 MMHG | TEMPERATURE: 97.9 F | HEART RATE: 80 BPM | WEIGHT: 160.2 LBS | BODY MASS INDEX: 30.25 KG/M2 | HEIGHT: 61 IN | OXYGEN SATURATION: 93 %

## 2022-12-19 DIAGNOSIS — M05.9 SEROPOSITIVE RHEUMATOID ARTHRITIS (HCC): ICD-10-CM

## 2022-12-19 DIAGNOSIS — M15.9 PRIMARY OSTEOARTHRITIS INVOLVING MULTIPLE JOINTS: ICD-10-CM

## 2022-12-19 DIAGNOSIS — Z79.60 LONG-TERM USE OF IMMUNOSUPPRESSANT MEDICATION: ICD-10-CM

## 2022-12-19 PROCEDURE — 99214 OFFICE O/P EST MOD 30 MIN: CPT | Performed by: STUDENT IN AN ORGANIZED HEALTH CARE EDUCATION/TRAINING PROGRAM

## 2022-12-19 PROCEDURE — 99211 OFF/OP EST MAY X REQ PHY/QHP: CPT | Performed by: STUDENT IN AN ORGANIZED HEALTH CARE EDUCATION/TRAINING PROGRAM

## 2022-12-19 ASSESSMENT — FIBROSIS 4 INDEX: FIB4 SCORE: 0.8

## 2022-12-20 ENCOUNTER — HOSPITAL ENCOUNTER (OUTPATIENT)
Dept: RADIOLOGY | Facility: MEDICAL CENTER | Age: 62
End: 2022-12-20
Payer: COMMERCIAL

## 2022-12-20 NOTE — PATIENT INSTRUCTIONS
AFTER VISIT INSTRUCTIONS    Below are important information to help you navigate your healthcare needs and help us serve you safely and effectively:  If laboratory tests and/or imaging studies were ordered, remember to go get them done as instructed.  If new prescriptions and/or refills were sent, remember to go pick them up from your local pharmacy, or call the specialty pharmacy to request shipment.  Always take your prescription medications exactly as prescribed unless instructed otherwise.  Note that antirheumatic drugs and steroids are immunosuppressive which means they increase your risk of infections and have multiple potential adverse effects on various organ systems in your body, though most of them are uncommon.  It is important that you are up-to-date on age-appropriate immunizations, particularly shingles and bacterial/viral pneumonia vaccines, which you can request from me or your primary care provider.  Be sure to read the drug package inserts to learn about the potential side effects of your medications before you start taking them.  If you experience any significant drug side effects, stop taking the medication and notify me promptly, and depending on the severity of the side effects, consider going to an urgent care or emergency department for immediate attention.  If there are significant findings on your lab tests and imaging studies that warrant further action, I will notify you with explanations via Utterzhart or phone call, otherwise you can view them on [employer] and let me know if you have any questions.  Note that [employer] Green messages are typically read during office hours and may take 1-7 business days before a response depending on the urgency of the situation and how busy my clinic schedule is.  In general, [employer] messaging is for non-urgent matters that do not require immediate attention, so for urgent matters that cannot wait, you are advised to go to an urgent care.  Lastly, you are granted  MyChart access to my documentation of your visit and are encouraged to read my note which details my assessment and plan for your condition.

## 2022-12-20 NOTE — PROGRESS NOTES
[facility] RHEUMATOLOGY  [address] [place] [address], [address], [place] [address]  Phone: [phone] ? Fax: [phone]    RHEUMATOLOGY FOLLOW-UP VISIT NOTE      DATE OF SERVICE: 12/19/2022         Subjective     PRIMARY CARE PRACTITIONER:  TOM Pretty  [address] [facility] [address] [place] [address]    PATIENT IDENTIFICATION:  Diane Aguilar  [address] [place] [id number]    YOB: 1960    MEDICAL RECORD NUMBER: [id number]          CHIEF COMPLAINT:   Chief Complaint   Patient presents with    Follow-Up     Rheumatoid arthritis        RHEUMATOLOGIC HISTORY:  Diane Aguilar is a 62 y.o. female with pertinent history notable for seropositive rheumatoid arthritis diagnosed in 3/2022, osteoarthritis of multiple joints (hands and feet), and Hashimoto's thyroiditis with hypothyroidism. She initially presented on 3/22/2022, reported onset of polyarthralgia in 7/2021 preceded by hypersensitivity rash from weeding at her yard. Since then she had been treated with several one-week courses of steroids and was on prednisone 2.5mg daily when evaluated. With each course of steroid she experienced improvement but flared soon after completing the treatment. In addition, she has been managing with meloxicam 7.5mg daily which she had been taken for a long time. She reported ongoing pain/swelling in her hands, wrists, and feet, as well as pain in her lower back and hips, associated with morning stiffness lasting all day and improving with activity.     Pertinent treatment history as of 11/2022: Methotrexate 15-20 mg weekly (3/2022-11/2022, ineffective), leflunomide 20 mg daily (11/2022-), prednisone 20-30 mg tapers (3/2022-4/2022, 11/2022-12/2022, effective).     Pertinent labs as of 12/2022: Positive RF 80, anti-CCP 81, and JORGE 1:320 homogenous/cytoplasmic patterns with negative reflex panel (in 3/2022); positive anti-TPO 51.2 and anti-.8 (in 7/2021); negative/normal HLA-B27, ESR, CRP, vitamin D  (previously low at 26), CMP, and unremarkable CBC.     Pertinent imaging as of 3/23/2022 XRs: Hands with typical osteoarthritis of bilateral 1st carpometacarpal joint and distal interphalangeal joint. Feet with postoperative changes of the right 5th proximal phalanx in the left 1st metatarsal head, osteoarthritis of both 1st metatarsophalangeal joint, and right accessory navicular, anatomic variant.    INTERVAL HISTORY:  Mild-to-moderate pain in hands (MCP joints) and wrists.  Worsening pain on overuse at work.    REVIEW OF SYSTEMS:  Except as noted in the history above, relevant review of systems with emphasis on autoimmune inflammatory processes was otherwise negative.      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 No pertinent surgical history.    MEDICATIONS:  Current Outpatient Medications   Medication Sig    leflunomide (ARAVA) 20 MG Tab Take 1 Tablet by mouth every day for 90 days.    estradiol-norethindrone (ACTIVELLA) 1-0.5 MG per tablet Take 1 Tablet by mouth every day.    levothyroxine (SYNTHROID) 112 MCG Tab Take 1 Tablet by mouth every morning on an empty stomach. Take 1 tablet by mouth every morning on an empty stomach. DUE  "FOR THYROID LABS prior to additional refills    halobetasol (ULTRAVATE) 0.05 % cream Apply  topically 2 times a day. Times 2 weeks    cetirizine (ZYRTEC) 10 MG Tab Take 4 tablets at night for 1 to 2 weeks then 1 tablet nightly for itching    VITAMIN D PO Take  by mouth.    Calcium Carbonate (CALCIUM 500 PO) Take  by mouth.    folic acid (FOLVITE) 1 MG Tab Take 1 mg by mouth every day. for 30 days (Patient not taking: Reported on 12/19/2022)       ALLERGIES:   Allergies   Allergen Reactions    Codeine     Pcn [Penicillins]        IMMUNIZATIONS:  Immunization History   Administered Date(s) Administered    Influenza Seasonal Injectable - Historical Data 12/18/2012    Influenza Vac Subunit Quad Inj (Pf) 10/15/2018    Influenza Vaccine Quad Inj (Pf) 10/07/2017, 10/18/2019    Influenza Vaccine Quad Recombinant 10/06/2020, 11/18/2021    MODERNA SARS-COV-2 VACCINE (12+) 06/04/2021, 07/02/2021    Zoster Vaccine Live (ZVL) (Zostavax) - HISTORICAL DATA 09/20/2016       SOCIAL HISTORY:   Social History     Socioeconomic History    Marital status:    Tobacco Use    Smoking status: Every Day     Packs/day: 1.00     Years: 23.00     Pack years: 23.00     Types: Cigarettes    Smokeless tobacco: Never   Vaping Use    Vaping Use: Never used   Substance and Sexual Activity    Alcohol use: Yes     Comment: occ    Drug use: Never    Sexual activity: Yes     Partners: Male     Birth control/protection: Post-Menopausal       FAMILY HISTORY:  Family History   Problem Relation Age of Onset    Other Father         ALS    Lung Disease Paternal Grandmother             Objective     Vital Signs: /70 (BP Location: Left arm, Patient Position: Sitting, BP Cuff Size: Adult)   Pulse 80   Temp 36.6 °C (97.9 °F) (Temporal)   Resp 16   Ht 1.549 m (5' 1\")   Wt 72.7 kg (160 lb 3.2 oz)   SpO2 93% Body mass index is 30.27 kg/m².    General: Appears well and comfortable  Eyes: No scleral or conjunctival lesions  ENT: No apparent oral or " nasal lesions  Head/Neck: No apparent scalp or neck lesions  Cardiovascular: Regular rate and rhythm  Respiratory: Breathing quiet and unlabored  Gastrointestinal: No organomegaly or abdominal masses  Integumentary: No significant cutaneous lesions or discolorations  Musculoskeletal: Minimal tenderness of bilateral first CMC joint; no periarticular soft tissue swelling, warmth, erythema, or overt signs of synovitis; no significant restriction in range of motion of joints examined  Neurologic: No focal sensory or motor deficits  Psychiatric: Mood and affect appropriate      LABORATORY RESULTS REVIEWED AND INTERPRETED BY ME:  Lab Results   Component Value Date/Time    SEDRATEWES 6 12/14/2022 11:40 AM    CREACTPROT <0.30 12/14/2022 11:40 AM     Lab Results   Component Value Date/Time    RHEUMFACTN 80 (H) 03/08/2022 02:32 PM    CCPANTIBODY 81 (H) 03/23/2022 09:46 AM    HUAJ56XSAU Negative 10/26/2022 03:55 PM     Lab Results   Component Value Date/Time    ANTINUCAB Detected (A) 03/23/2022 09:46 AM     Lab Results   Component Value Date/Time    ANTIDNADS 15 03/23/2022 09:46 AM    SMITHAB 3 03/23/2022 09:46 AM    RNPAB 7 03/23/2022 09:46 AM    MGRPYJY17 9 03/23/2022 09:46 AM    SSA60 3 03/23/2022 09:46 AM    SSA52 1 03/23/2022 09:46 AM    ANTISSBSJ 0 03/23/2022 09:46 AM    JO1AB 1 03/23/2022 09:46 AM     Lab Results   Component Value Date/Time    MICROSOMALA 51.2 (H) 07/24/2021 09:09 AM    ANTITHYROGL 286.8 (H) 07/24/2021 09:09 AM    TSHULTRASEN 1.300 12/14/2022 11:39 AM     Lab Results   Component Value Date/Time    ASTSGOT 14 12/14/2022 11:39 AM    ALTSGPT 19 12/14/2022 11:39 AM    ALKPHOSPHAT 66 12/14/2022 11:39 AM    TBILIRUBIN 0.6 12/14/2022 11:39 AM    TOTPROTEIN 7.1 12/14/2022 11:39 AM    ALBUMIN 4.4 12/14/2022 11:39 AM     Lab Results   Component Value Date/Time    SODIUM 139 12/14/2022 11:39 AM    POTASSIUM 4.2 12/14/2022 11:39 AM    CHLORIDE 105 12/14/2022 11:39 AM    CO2 25 12/14/2022 11:39 AM    GLUCOSE 84  12/14/2022 11:39 AM    BUN 16 12/14/2022 11:39 AM    CREATININE 0.92 12/14/2022 11:39 AM    CALCIUM 9.1 12/14/2022 11:39 AM     Lab Results   Component Value Date/Time    WBC 9.1 12/14/2022 11:40 AM    RBC 4.72 12/14/2022 11:40 AM    HEMOGLOBIN 15.4 12/14/2022 11:40 AM    HEMATOCRIT 46.6 12/14/2022 11:40 AM    MCV 98.7 (H) 12/14/2022 11:40 AM    MCH 32.6 12/14/2022 11:40 AM    MCHC 33.0 (L) 12/14/2022 11:40 AM    RDW 51.2 (H) 12/14/2022 11:40 AM    PLATELETCT 244 12/14/2022 11:40 AM    MPV 10.9 12/14/2022 11:40 AM    NEUTS 6.21 12/14/2022 11:40 AM    LYMPHOCYTES 22.20 12/14/2022 11:40 AM    MONOCYTES 7.70 12/14/2022 11:40 AM    EOSINOPHILS 0.80 12/14/2022 11:40 AM    BASOPHILS 0.70 12/14/2022 11:40 AM     Lab Results   Component Value Date/Time    25HYDROXY 49 12/14/2022 11:39 AM     Lab Results   Component Value Date/Time    CHOLSTRLTOT 203 (H) 12/14/2022 11:39 AM     (H) 12/14/2022 11:39 AM    HDL 85 12/14/2022 11:39 AM    TRIGLYCERIDE 72 12/14/2022 11:39 AM       RADIOLOGY RESULTS REVIEWED AND INTERPRETED BY ME:  Results for orders placed in visit on 03/23/22    DX-JOINT SURVEY-FEET SINGLE VIEW    Impression  1.  No evidence for inflammatory arthritis    2.  Postoperative changes of the right 5th proximal phalanx in the left 1st metatarsal head    3.  osteoarthritis of both 1st metatarsophalangeal joint    4.  Right accessory navicular, anatomic variant    Results for orders placed in visit on 03/23/22    DX-JOINT SURVEY-HANDS SINGLE VIEW    Impression  1.  No evidence for erosive arthritis    2.  Typical osteoarthritis of bilateral 1st carpometacarpal joint and distal interphalangeal joint      All relevant laboratory and imaging results reported on this note were reviewed and interpreted by me.         Assessment & Plan     Diane Aguilar is a 62 y.o. female with history as noted above whose presentation merits the following diagnostic and clinical status impressions and recommendations:    1.  Seropositive rheumatoid arthritis (HCC)  Clinically low disease activity without evidence of evolving or impending flare on the current regimen of leflunomide monotherapy, so no need for escalation of treatment. However, given the potential for discordance between immunologic activity and clinical disease manifestations, reasonable to routinely check serologic markers of disease activity for complete assessment.  - Sed Rate; Future  - CRP QUANTITIVE (NON-CARDIAC); Future  - Continue leflunomide 20 mg daily    2. Long-term use of immunosuppressant medication  No present history, physical findings, or laboratory evidence to suggest significant adverse drug effects or opportunistic infections.  - CBC WITH DIFFERENTIAL; Future  - Comp Metabolic Panel; Future  - Up to date on most age-appropriate vaccines    3. Primary osteoarthritis involving multiple joints  Presumably the most significant contributor to her overall joint pain at this time which can be managed with topical or oral NSAIDs and analgesics.  - Consider intra-articular steroid injection if needed      FOLLOW-UP: Return in about 3 months (around 3/19/2023) for Short.         Thank you for the opportunity to participate in the care of Diane Aguilar.    Josr Mayer MD, MS  Rheumatologist, [facility] Rheumatology ? [facility]   of Clinical Medicine, Department of Internal Medicine  [facility] ? [facility]

## 2023-01-03 ENCOUNTER — APPOINTMENT (OUTPATIENT)
Dept: RADIOLOGY | Facility: MEDICAL CENTER | Age: 63
End: 2023-01-03
Attending: NURSE PRACTITIONER
Payer: COMMERCIAL

## 2023-01-10 ENCOUNTER — PATIENT MESSAGE (OUTPATIENT)
Dept: RHEUMATOLOGY | Facility: MEDICAL CENTER | Age: 63
End: 2023-01-10
Payer: COMMERCIAL

## 2023-01-10 DIAGNOSIS — M05.9 SEROPOSITIVE RHEUMATOID ARTHRITIS (HCC): ICD-10-CM

## 2023-01-10 RX ORDER — CELECOXIB 200 MG/1
200 CAPSULE ORAL
Qty: 60 CAPSULE | Refills: 0 | Status: SHIPPED | OUTPATIENT
Start: 2023-01-10 | End: 2023-03-21

## 2023-01-13 ENCOUNTER — HOSPITAL ENCOUNTER (OUTPATIENT)
Dept: RADIOLOGY | Facility: MEDICAL CENTER | Age: 63
End: 2023-01-13
Attending: NURSE PRACTITIONER
Payer: COMMERCIAL

## 2023-01-13 DIAGNOSIS — Z12.31 ENCOUNTER FOR SCREENING MAMMOGRAM FOR BREAST CANCER: ICD-10-CM

## 2023-01-13 DIAGNOSIS — Z79.890 HORMONE REPLACEMENT THERAPY (HRT): ICD-10-CM

## 2023-01-13 PROCEDURE — 77063 BREAST TOMOSYNTHESIS BI: CPT

## 2023-01-17 ENCOUNTER — OFFICE VISIT (OUTPATIENT)
Dept: RHEUMATOLOGY | Facility: MEDICAL CENTER | Age: 63
End: 2023-01-17
Attending: STUDENT IN AN ORGANIZED HEALTH CARE EDUCATION/TRAINING PROGRAM
Payer: COMMERCIAL

## 2023-01-17 ENCOUNTER — DOCUMENTATION (OUTPATIENT)
Dept: PHARMACY | Facility: MEDICAL CENTER | Age: 63
End: 2023-01-17
Payer: COMMERCIAL

## 2023-01-17 VITALS
SYSTOLIC BLOOD PRESSURE: 112 MMHG | WEIGHT: 156.6 LBS | OXYGEN SATURATION: 93 % | RESPIRATION RATE: 16 BRPM | TEMPERATURE: 98.8 F | DIASTOLIC BLOOD PRESSURE: 80 MMHG | HEART RATE: 104 BPM | BODY MASS INDEX: 29.57 KG/M2 | HEIGHT: 61 IN

## 2023-01-17 DIAGNOSIS — M15.9 PRIMARY OSTEOARTHRITIS INVOLVING MULTIPLE JOINTS: ICD-10-CM

## 2023-01-17 DIAGNOSIS — M05.9 SEROPOSITIVE RHEUMATOID ARTHRITIS (HCC): ICD-10-CM

## 2023-01-17 DIAGNOSIS — Z79.60 LONG-TERM USE OF IMMUNOSUPPRESSANT MEDICATION: ICD-10-CM

## 2023-01-17 PROCEDURE — 99212 OFFICE O/P EST SF 10 MIN: CPT | Performed by: STUDENT IN AN ORGANIZED HEALTH CARE EDUCATION/TRAINING PROGRAM

## 2023-01-17 PROCEDURE — 99214 OFFICE O/P EST MOD 30 MIN: CPT | Performed by: STUDENT IN AN ORGANIZED HEALTH CARE EDUCATION/TRAINING PROGRAM

## 2023-01-17 RX ORDER — MEDROXYPROGESTERONE ACETATE 150 MG/ML
50 INJECTION, SUSPENSION INTRAMUSCULAR
Qty: 4 EACH | Refills: 5 | Status: SHIPPED | OUTPATIENT
Start: 2023-01-17 | End: 2023-07-04

## 2023-01-17 RX ORDER — PREDNISONE 10 MG/1
TABLET ORAL
Qty: 35 TABLET | Refills: 0 | Status: SHIPPED | OUTPATIENT
Start: 2023-01-17 | End: 2023-02-14

## 2023-01-17 ASSESSMENT — RHEUMATOLOGY FOLLOW-UP QUESTIONNAIRE
ANXIETY: N
ORAL ULCERS: N
THYROID PAIN: N
DRY EYES: N
DIFFICULTY SWALLOWING: N
EYE REDNESS: N
HAIR SHEDDING: N
SHORTNESS OF BREATH: N
ABNORMAL DISCHARGE: N
BURNING URINATION: N
NAUSEA: Y
CHEST PAIN WITH BREATHING: N
BODY ACHES: N
HAIR LOSS WITH BALD SPOTS: N
NUMBNESS: Y
HEADACHES: Y
RINGING IN EARS: N
RATE_1TO10: 8
DEPRESSION: N
SORE THROAT: N
BLEEDING GUMS: N
ACHILLES TENDON PAIN: N
NASAL ULCERS: N
VISION LOSS: N
DIFFICULTY SPEAKING: N
COUGH WITH BLOODY PHLEGM: Y
SINUS PAIN: N
SKIN THICKENING: N
MUSCLE WEAKNESS: Y
EAR PAIN: N
NOSEBLEEDS: N
MARK ALL THE AREAS OF PAIN: 81381921
ABDOMINAL PAIN: N
BLOODY CONSTIPATION: N
PELVIC PAIN: N
SINONASAL CONGESTION: N
BLOOD IN URINE: N
PERSONAL OR EMOTIONAL STRESSORS: PAIN
TINGLING: Y
COLD-INDUCED COLOR CHANGES (WHITE, PURPLE, RED ON REWARMING): FINGERS
TEMPLE PAIN: N
CAVITIES: N
COLD-INDUCED COLOR CHANGES (WHITE, PURPLE, RED ON REWARMING): TOES
JOINT PAIN: SAME WITH ACTIVITY
NIGHT SWEATS: N
MORNING STIFFNESS: UNCHANGED WITH ACTIVITY
BLOODY DIARRHEA: N
JOINT SWELLING: Y
DIZZINESS: N
GOITER: N
VERTIGO: N
HEARING LOSS: N
NECK PAIN: N
FROTHY URINE: N
EYE PAIN: N
SNORING: N
SPASMS: N
FEVERS: N
DRY COUGH: N
SKIN PLAQUES: N
BLURRINESS: N
PALPITATIONS: N
DRY MOUTH: N
CHILLS: N
GENITAL ULCERS: N
MUSCLE PAIN: N
VOMITING: N
IRREGULAR BEATS: N
HEARTBURN: Y

## 2023-01-17 ASSESSMENT — FIBROSIS 4 INDEX: FIB4 SCORE: 0.8

## 2023-01-17 NOTE — PROGRESS NOTES
OMAR LOCK (Key: [id number])  Enbrel SureClick 50MG/ML auto-injectors  Status: Question Request  Created: January 17th, 2023

## 2023-01-17 NOTE — PATIENT INSTRUCTIONS
AFTER VISIT INSTRUCTIONS    Below are important information to help you navigate your healthcare needs and help us serve you safely and effectively:  If laboratory tests and/or imaging studies were ordered, remember to go get them done as instructed.  If new prescriptions and/or refills were sent, remember to go pick them up from your local pharmacy, or call the specialty pharmacy to request shipment.  Always take your prescription medications exactly as prescribed unless instructed otherwise.  Note that antirheumatic drugs and steroids are immunosuppressive which means they increase your risk of infections and have multiple potential adverse effects on various organ systems in your body, though most of them are uncommon.  It is important that you are up-to-date on age-appropriate immunizations, particularly shingles and bacterial/viral pneumonia vaccines, which you can request from me or your primary care provider.  Be sure to read the drug package inserts to learn about the potential side effects of your medications before you start taking them.  If you experience any significant drug side effects, stop taking the medication and notify me promptly, and depending on the severity of the side effects, consider going to an urgent care or emergency department for immediate attention.  If there are significant findings on your lab tests and imaging studies that warrant further action, I will notify you with explanations via [employer] or phone call, otherwise you can view them on [employer] and let me know if you have any questions.  Note that [employer] messages are typically read during office hours and may take 1-7 business days before a response depending on the urgency of the situation and how busy my clinic schedule is.  In general, [employer] messaging is for non-urgent matters that do not require immediate attention, so for urgent matters that cannot wait, you are advised to go to an urgent care.  Lastly, you are granted  MyChart access to my documentation of your visit and are encouraged to read my note which details my assessment and plan for your condition.

## 2023-01-17 NOTE — PROGRESS NOTES
[facility] RHEUMATOLOGY  [address] [place] [address], [address], [place] [address]  Phone: [phone] ? Fax: [phone]    RHEUMATOLOGY FOLLOW-UP VISIT NOTE      DATE OF SERVICE: 01/17/2023         Subjective     PRIMARY CARE PRACTITIONER:  TOM Pretty  [address] [place] [address]    PATIENT IDENTIFICATION:  Diane Aguilar  [address] [place] [address] [place] [address]    YOB: 1960    MEDICAL RECORD NUMBER: [id number]          CHIEF COMPLAINT:   Chief Complaint   Patient presents with    Follow-Up     Rheumatoid arthritis        RHEUMATOLOGIC HISTORY:  Diane Aguilar is a 62 y.o. female with pertinent history notable for seropositive rheumatoid arthritis diagnosed in 3/2022, osteoarthritis of multiple joints (hands and feet), and Hashimoto's thyroiditis with hypothyroidism. She initially presented on 3/22/2022, reported onset of polyarthralgia in 7/2021 preceded by hypersensitivity rash from weeding at her yard. Since then she had been treated with several one-week courses of steroids and was on prednisone 2.5mg daily when evaluated. With each course of steroid she experienced improvement but flared soon after completing the treatment. In addition, she has been managing with meloxicam 7.5mg daily which she had been taken for a long time. She reported ongoing pain/swelling in her hands, wrists, and feet, as well as pain in her lower back and hips, associated with morning stiffness lasting all day and improving with activity.     Pertinent treatment history as of 11/2022: Prednisone 20-30 mg tapers during flares (on/off since 3/2022, effective), methotrexate 15-20 mg weekly (3/2022-11/2022, ineffective), leflunomide 20 mg daily (11/2022-present), Enbrel 50 mg SC weekly (1/2023-present).     Pertinent labs as of 12/2022: Positive RF 80, anti-CCP 81, and JORGE 1:320 homogenous/cytoplasmic patterns with negative reflex panel (in 3/2022); positive anti-TPO 51.2 and anti-.8 (in 7/2021);  negative/normal HLA-B27, ESR, CRP, vitamin D (previously low at 26), CMP, and unremarkable CBC.     Pertinent imaging as of 3/2022 XRs: Hands with typical osteoarthritis of bilateral 1st carpometacarpal joint and distal interphalangeal joint. Feet with postoperative changes of the right 5th proximal phalanx in the left 1st metatarsal head, osteoarthritis of both 1st metatarsophalangeal joint, and right accessory navicular, anatomic variant.    INTERVAL HISTORY:  [facility] Rheumatology Established Patient History Form    1/17/2023  1:03 PM PST - Filed by Patient   Chief Complaint Swolen in  umerous places and alot of pain   Interval History of Present Condition   Date of worsening symptom onset: 1/9/2023   Preceding incident/ailment:    Describe/list your symptoms: Shoulder, jaw bone, elbow and hand on left side swolen and very painful. Right hand fist 2 knuckles. Both feet. So much pain not much sleep. Knees sometimes.   Aggravating factors:    Alleviating factors: Heating pads   Helpful medications:    Ineffective medications: Laflunomide ,celobrex   Symptom severity/impact (scale of 1-10): 8   Personal/emotional stressors: Pain   Shade All The Locations Of Pain    REVIEW OF SYSTEMS    General   Fevers No   Chills No   Night sweats No   Unintentional Weight Loss None   Musculoskeletal   Joint pain Same with activity   Morning stiffness Unchanged with activity   Joint swelling Yes   Achilles tendon pain No   Muscle pain No   Body Aches No   Dermatologic   Hair loss with bald spots No   Hair shedding No   Sunlight-induced skin rash    Skin thickening No   Skin plaques No   Cold-induced color changes (white, purple, red on rewarming) Fingers;  Toes   Neurologic/Psychiatric   Muscle weakness Yes   Spasms No   Tingling Yes   Numbness Yes   Anxiety No   Depression No   Head/Eyes   Headaches Yes   Temple pain No   Dizziness No   Dry eyes No   Eye pain No   Eye redness No   Blurriness No   Vision loss No   Ears/Nose   Ear pain  No   Ringing in ears No   Vertigo No   Hearing loss No   Nasal ulcers No   Nosebleeds No   Sinus pain No   Sinonasal congestion No   Snoring No   Mouth/Throat   Oral ulcers No   Bleeding gums No   Dry mouth No   Cavities No   Sore throat No   Difficulty speaking No   Difficulty swallowing No   Neck/Lymphatics   Neck pain No   Thyroid pain No   Goiter No   Swollen Glands    Cardiac/Respiratory   Chest pain with breathing No   Dry cough No   Cough with bloody phlegm Yes   Shortness of breath No   Palpitations No   Irregular beats No   Gastrointestinal   Nausea Yes   Vomiting No   Heartburn Yes   Abdominal pain No   Bloody diarrhea No   Bloody constipation No   Genitourinary   Pelvic Pain No   Genital ulcers No   Abnormal discharge No   Burning urination No   Frothy urine No   Blood in urine No   Supplemental Information   Notable Life Changes/Adjustments Since Last Visit Added Celebrex for inflamation       ACTIVE PROBLEM LIST:  Patient Active Problem List    Diagnosis Date Noted    Primary osteoarthritis involving multiple joints (hands and feet) 06/10/2022    Seropositive rheumatoid arthritis (HCC) 03/22/2022    Long-term use of immunosuppressant medication 03/22/2022    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    MEDICATIONS:  Current Outpatient Medications   Medication Sig    Etanercept (ENBREL SURECLICK) 50 MG/ML Solution Auto-injector Inject 50 mg under the skin every 7 days.    predniSONE  (DELTASONE) 10 MG Tab Take 2 Tablets by mouth every day for 7 days, THEN 1.5 Tablets every day for 7 days, THEN 1 Tablet every day for 7 days, THEN 0.5 Tablets every day for 7 days. Take with food.    celecoxib (CELEBREX) 200 MG Cap Take 1 Capsule by mouth 2 times daily with meals as needed for Moderate Pain (for arthritis).    leflunomide (ARAVA) 20 MG Tab Take 1 Tablet by mouth every day for 90 days.    estradiol-norethindrone (ACTIVELLA) 1-0.5 MG per tablet Take 1 Tablet by mouth every day.    levothyroxine (SYNTHROID) 112 MCG Tab Take 1 Tablet by mouth every morning on an empty stomach. Take 1 tablet by mouth every morning on an empty stomach. DUE FOR THYROID LABS prior to additional refills    folic acid (FOLVITE) 1 MG Tab Take 1 mg by mouth every day. for 30 days    halobetasol (ULTRAVATE) 0.05 % cream Apply  topically 2 times a day. Times 2 weeks    cetirizine (ZYRTEC) 10 MG Tab Take 4 tablets at night for 1 to 2 weeks then 1 tablet nightly for itching    VITAMIN D PO Take  by mouth.    Calcium Carbonate (CALCIUM 500 PO) Take  by mouth.       ALLERGIES:   Allergies   Allergen Reactions    Codeine     Pcn [Penicillins]        IMMUNIZATIONS:  Immunization History   Administered Date(s) Administered    Influenza Seasonal Injectable - Historical Data 12/18/2012    Influenza Vac Subunit Quad Inj (Pf) 10/15/2018    Influenza Vaccine Quad Inj (Pf) 10/07/2017, 10/18/2019    Influenza Vaccine Quad Recombinant 10/06/2020, 11/18/2021    MODERNA SARS-COV-2 VACCINE (12+) 06/04/2021, 07/02/2021    Zoster Vaccine Live (ZVL) (Zostavax) - HISTORICAL DATA 09/20/2016       SOCIAL HISTORY:   Social History     Socioeconomic History    Marital status:    Tobacco Use    Smoking status: Every Day     Packs/day: 1.00     Years: 23.00     Pack years: 23.00     Types: Cigarettes    Smokeless tobacco: Never   Vaping Use    Vaping Use: Never used   Substance and Sexual Activity    Alcohol use: Yes     Comment: occ    Drug use:  "Never    Sexual activity: Yes     Partners: Male     Birth control/protection: Post-Menopausal       FAMILY HISTORY:  Family History   Problem Relation Age of Onset    Other Father         ALS    Lung Disease Paternal Grandmother             Objective     Vital Signs: /80 (BP Location: Right arm, Patient Position: Sitting, BP Cuff Size: Adult)   Pulse (!) 104   Temp 37.1 °C (98.8 °F) (Temporal)   Resp 16   Ht 1.549 m (5' 1\")   Wt 71 kg (156 lb 9.6 oz)   SpO2 93% Body mass index is 29.59 kg/m².    General: Appears well and comfortable  Eyes: No scleral or conjunctival lesions  ENT: No apparent oral or nasal lesions  Head/Neck: No apparent scalp or neck lesions  Cardiovascular: Regular rate and rhythm  Respiratory: Breathing quiet and unlabored  Gastrointestinal: No organomegaly or abdominal masses  Integumentary: No significant cutaneous lesions or discolorations  Musculoskeletal: Moderate-to-severe tenderness of hands with swelling of MCP joints (right > left), wrists, knees, and feet; overall range of motion somewhat limited by pain in affected joints  Neurologic: No focal sensory or motor deficits  Psychiatric: Mood and affect appropriate      LABORATORY RESULTS REVIEWED AND INTERPRETED BY ME:  Lab Results   Component Value Date/Time    SEDRATEWES 6 12/14/2022 11:40 AM    CREACTPROT <0.30 12/14/2022 11:40 AM     Lab Results   Component Value Date/Time    RHEUMFACTN 80 (H) 03/08/2022 02:32 PM    CCPANTIBODY 81 (H) 03/23/2022 09:46 AM    TUYT74ABSD Negative 10/26/2022 03:55 PM     Lab Results   Component Value Date/Time    ANTINUCAB Detected (A) 03/23/2022 09:46 AM     Lab Results   Component Value Date/Time    ANTIDNADS 15 03/23/2022 09:46 AM    SMITHAB 3 03/23/2022 09:46 AM    RNPAB 7 03/23/2022 09:46 AM    NYDOMXL44 9 03/23/2022 09:46 AM    SSA60 3 03/23/2022 09:46 AM    SSA52 1 03/23/2022 09:46 AM    ANTISSBSJ 0 03/23/2022 09:46 AM    JO1AB 1 03/23/2022 09:46 AM     Lab Results   Component Value " Date/Time    MICROSOMALA 51.2 (H) 07/24/2021 09:09 AM    ANTITHYROGL 286.8 (H) 07/24/2021 09:09 AM    TSHULTRASEN 1.300 12/14/2022 11:39 AM     Lab Results   Component Value Date/Time    ASTSGOT 14 12/14/2022 11:39 AM    ALTSGPT 19 12/14/2022 11:39 AM    ALKPHOSPHAT 66 12/14/2022 11:39 AM    TBILIRUBIN 0.6 12/14/2022 11:39 AM    TOTPROTEIN 7.1 12/14/2022 11:39 AM    ALBUMIN 4.4 12/14/2022 11:39 AM     Lab Results   Component Value Date/Time    SODIUM 139 12/14/2022 11:39 AM    POTASSIUM 4.2 12/14/2022 11:39 AM    CHLORIDE 105 12/14/2022 11:39 AM    CO2 25 12/14/2022 11:39 AM    GLUCOSE 84 12/14/2022 11:39 AM    BUN 16 12/14/2022 11:39 AM    CREATININE 0.92 12/14/2022 11:39 AM    CALCIUM 9.1 12/14/2022 11:39 AM     Lab Results   Component Value Date/Time    WBC 9.1 12/14/2022 11:40 AM    RBC 4.72 12/14/2022 11:40 AM    HEMOGLOBIN 15.4 12/14/2022 11:40 AM    HEMATOCRIT 46.6 12/14/2022 11:40 AM    MCV 98.7 (H) 12/14/2022 11:40 AM    MCH 32.6 12/14/2022 11:40 AM    MCHC 33.0 (L) 12/14/2022 11:40 AM    RDW 51.2 (H) 12/14/2022 11:40 AM    PLATELETCT 244 12/14/2022 11:40 AM    MPV 10.9 12/14/2022 11:40 AM    NEUTS 6.21 12/14/2022 11:40 AM    LYMPHOCYTES 22.20 12/14/2022 11:40 AM    MONOCYTES 7.70 12/14/2022 11:40 AM    EOSINOPHILS 0.80 12/14/2022 11:40 AM    BASOPHILS 0.70 12/14/2022 11:40 AM     Lab Results   Component Value Date/Time    25HYDROXY 49 12/14/2022 11:39 AM     Lab Results   Component Value Date/Time    CHOLSTRLTOT 203 (H) 12/14/2022 11:39 AM     (H) 12/14/2022 11:39 AM    HDL 85 12/14/2022 11:39 AM    TRIGLYCERIDE 72 12/14/2022 11:39 AM       RADIOLOGY RESULTS REVIEWED AND INTERPRETED BY ME:  Results for orders placed in visit on 03/23/22    DX-JOINT SURVEY-FEET SINGLE VIEW    Impression  1.  No evidence for inflammatory arthritis    2.  Postoperative changes of the right 5th proximal phalanx in the left 1st metatarsal head    3.  osteoarthritis of both 1st metatarsophalangeal joint    4.  Right  accessory navicular, anatomic variant    Results for orders placed in visit on 03/23/22    DX-JOINT SURVEY-HANDS SINGLE VIEW    Impression  1.  No evidence for erosive arthritis    2.  Typical osteoarthritis of bilateral 1st carpometacarpal joint and distal interphalangeal joint      All relevant laboratory and imaging results reported on this note were reviewed and interpreted by me.         Assessment & Plan     Diane Aguilar is a 62 y.o. female with history as noted above whose presentation merits the following diagnostic and clinical status impressions and recommendations:    1. Seropositive rheumatoid arthritis (HCC)  Clinically active disease with inadequate control on the current regimen of leflunomide monotherapy, so needs escalation of treatment to biologic therapy with Enbrel. Given that DMARDs can take up to 6 or more weeks to exert clinical effect, need to bridge with a course of steroid taper for more immediate symptom relief.  - Etanercept (ENBREL SURECLICK) 50 MG/ML Solution Auto-injector; Inject 50 mg under the skin every 7 days.  Dispense: 4 Each; Refill: 5  - predniSONE (DELTASONE) 10 MG Tab; Take 2 Tablets by mouth every day for 7 days, THEN 1.5 Tablets every day for 7 days, THEN 1 Tablet every day for 7 days, THEN 0.5 Tablets every day for 7 days. Take with food.  Dispense: 35 Tablet; Refill: 0  - Continue leflunomide 20 mg daily    2. Primary osteoarthritis involving multiple joints  Presumably an important contributor to her overall joint pain which can be managed with topical or oral NSAIDs and analgesics, but does not explain her inflammatory arthritis.  - Consider intra-articular steroid injection if needed    3. Long-term use of immunosuppressant medication  No present history, physical findings, or laboratory evidence to suggest significant adverse drug effects or opportunistic infections.  - HEP B SURFACE ANTIGEN  - HEP C VIRUS ANTIBODY  - Quantiferon Gold Plus TB (4 tube)  - Up to  date on most age-appropriate vaccines      FOLLOW-UP: Return in about 8 weeks (around 3/14/2023) for Short.         Thank you for the opportunity to participate in the care of Diane Aguilar.    Josr Mayer MD, MS  Rheumatologist, [facility] Rheumatology ? [facility]   of Clinical Medicine, Department of Internal Medicine  Formerly [facility] ? [facility]

## 2023-01-19 ENCOUNTER — HOSPITAL ENCOUNTER (OUTPATIENT)
Dept: LAB | Facility: MEDICAL CENTER | Age: 63
End: 2023-01-19
Attending: STUDENT IN AN ORGANIZED HEALTH CARE EDUCATION/TRAINING PROGRAM
Payer: COMMERCIAL

## 2023-01-19 DIAGNOSIS — M05.9 SEROPOSITIVE RHEUMATOID ARTHRITIS (HCC): ICD-10-CM

## 2023-01-19 DIAGNOSIS — Z79.60 LONG-TERM USE OF IMMUNOSUPPRESSANT MEDICATION: ICD-10-CM

## 2023-01-19 LAB
HBV SURFACE AG SER QL: NORMAL
HCV AB SER QL: NORMAL

## 2023-01-19 PROCEDURE — 86803 HEPATITIS C AB TEST: CPT

## 2023-01-19 PROCEDURE — 36415 COLL VENOUS BLD VENIPUNCTURE: CPT

## 2023-01-19 PROCEDURE — 87340 HEPATITIS B SURFACE AG IA: CPT

## 2023-01-20 NOTE — PROGRESS NOTES
PA has been approved through 1/20/24 but patient must fill with [facility].    Was able to speak with patient and let her know about which pharmacy she will be getting medication from. Was also able to sign patient up for a copay card through [facility].

## 2023-01-22 LAB — TEST NAME 95000: NORMAL

## 2023-02-06 DIAGNOSIS — E03.9 HYPOTHYROIDISM, UNSPECIFIED TYPE: ICD-10-CM

## 2023-02-06 NOTE — TELEPHONE ENCOUNTER
Received request via: Patient    Was the patient seen in the last year in this department? Yes    Does the patient have an active prescription (recently filled or refills available) for medication(s) requested? No    Does the patient have [facility] and need 100 day supply (blood pressure, diabetes and cholesterol meds only)? Patient does not have SCP    Pt has been advice twice to establish care    Last office Visit:03/08/2022  Last Labs12/14/2022

## 2023-02-07 ENCOUNTER — APPOINTMENT (OUTPATIENT)
Dept: MEDICAL GROUP | Facility: PHYSICIAN GROUP | Age: 63
End: 2023-02-07
Payer: COMMERCIAL

## 2023-02-07 RX ORDER — LEVOTHYROXINE SODIUM 112 UG/1
112 TABLET ORAL
Qty: 90 TABLET | Refills: 0 | Status: SHIPPED | OUTPATIENT
Start: 2023-02-07 | End: 2023-04-11 | Stop reason: SDUPTHER

## 2023-02-20 ENCOUNTER — OFFICE VISIT (OUTPATIENT)
Dept: URGENT CARE | Facility: PHYSICIAN GROUP | Age: 63
End: 2023-02-20
Payer: COMMERCIAL

## 2023-02-20 VITALS
HEART RATE: 64 BPM | RESPIRATION RATE: 16 BRPM | HEIGHT: 62 IN | DIASTOLIC BLOOD PRESSURE: 64 MMHG | BODY MASS INDEX: 28.52 KG/M2 | OXYGEN SATURATION: 98 % | TEMPERATURE: 97.3 F | SYSTOLIC BLOOD PRESSURE: 112 MMHG | WEIGHT: 155 LBS

## 2023-02-20 DIAGNOSIS — M54.50 ACUTE RIGHT-SIDED LOW BACK PAIN WITHOUT SCIATICA: ICD-10-CM

## 2023-02-20 PROCEDURE — 99213 OFFICE O/P EST LOW 20 MIN: CPT | Performed by: FAMILY MEDICINE

## 2023-02-20 RX ORDER — CYCLOBENZAPRINE HCL 5 MG
5 TABLET ORAL 3 TIMES DAILY PRN
Qty: 10 TABLET | Refills: 0 | Status: SHIPPED | OUTPATIENT
Start: 2023-02-20 | End: 2023-03-21

## 2023-02-20 RX ORDER — ESTRADIOL 1 MG/1
1 TABLET ORAL DAILY
COMMUNITY
Start: 2023-01-15 | End: 2023-03-21

## 2023-02-20 RX ORDER — PROGESTERONE 100 MG/1
CAPSULE ORAL
COMMUNITY
Start: 2023-01-15 | End: 2023-03-21

## 2023-02-20 ASSESSMENT — FIBROSIS 4 INDEX: FIB4 SCORE: 0.8

## 2023-02-20 NOTE — PROGRESS NOTES
"  Subjective:      62 y.o. female presents to urgent care for right lower back pain that started on Thursday.  There was no inciting event or trauma at that time.  Pain is constant, described as sharp, currently rated 9/10.  It does radiate down her right leg.  She has tried Celebrex with only some relief in symptoms.  No weakness to her lower extremities bilaterally.  No loss of bowel or bladder function.    She denies any other questions or concerns at this time.    Current problem list, medication, and past medical/surgical history were reviewed in Epic.    ROS  See HPI     Objective:      /64   Pulse 64   Temp 36.3 °C (97.3 °F) (Temporal)   Resp 16   Ht 1.575 m (5' 2\")   Wt 70.3 kg (155 lb)   SpO2 98%   BMI 28.35 kg/m²     Physical Exam  Constitutional:       General: She is not in acute distress.     Appearance: She is not diaphoretic.   Cardiovascular:      Rate and Rhythm: Normal rate and regular rhythm.      Heart sounds: Normal heart sounds.   Pulmonary:      Effort: Pulmonary effort is normal. No respiratory distress.      Breath sounds: Normal breath sounds.   Musculoskeletal:      Comments: No discolorations or deformities noted to inspection of back.  No step-offs or areas of tenderness palpation of spine.  Nontender to palpation of paraspinal region either.   Skin:     Findings: No rash.   Neurological:      Mental Status: She is alert and oriented to person, place, and time.      Comments: Negative straight leg test.  Equal strength and sensation to lower extremities bilaterally.  Normal gait.   Psychiatric:         Mood and Affect: Affect normal.         Judgment: Judgment normal.     Assessment/Plan:     1. Acute right-sided low back pain without sciatica  We will trial Flexeril 2 times a day as needed.  She was encouraged to continue with heat, Celebrex, and massage as needed.  - cyclobenzaprine (FLEXERIL) 5 mg tablet; Take 1 Tablet by mouth 3 times a day as needed for Muscle Spasms or " Moderate Pain.  Dispense: 10 Tablet; Refill: 0      Instructed to return to Urgent Care or nearest Emergency Department if symptoms fail to improve, for any change in condition, further concerns, or new concerning symptoms. Patient states understanding of the plan of care and discharge instructions.    Alana Fontenot M.D.

## 2023-02-20 NOTE — LETTER
February 20, 2023    To Whom It May Concern:         This is confirmation that Diane Machuca Jeff attended her scheduled appointment with Alana Fontenot M.D. on 2/20/23. She may return to work on Thursday without any restrictions.          If you have any questions please do not hesitate to call me at the phone number listed below.    Sincerely,          Alana Fontenot M.D.  [phone]

## 2023-02-22 ENCOUNTER — OFFICE VISIT (OUTPATIENT)
Dept: URGENT CARE | Facility: PHYSICIAN GROUP | Age: 63
End: 2023-02-22
Payer: COMMERCIAL

## 2023-02-22 VITALS
TEMPERATURE: 97.4 F | WEIGHT: 155 LBS | OXYGEN SATURATION: 94 % | HEIGHT: 62 IN | SYSTOLIC BLOOD PRESSURE: 146 MMHG | HEART RATE: 70 BPM | BODY MASS INDEX: 28.52 KG/M2 | DIASTOLIC BLOOD PRESSURE: 72 MMHG | RESPIRATION RATE: 16 BRPM

## 2023-02-22 DIAGNOSIS — M54.31 SCIATICA OF RIGHT SIDE: ICD-10-CM

## 2023-02-22 PROCEDURE — 99213 OFFICE O/P EST LOW 20 MIN: CPT | Performed by: PHYSICIAN ASSISTANT

## 2023-02-22 RX ORDER — PREDNISONE 20 MG/1
40 TABLET ORAL DAILY
Qty: 10 TABLET | Refills: 0 | Status: SHIPPED | OUTPATIENT
Start: 2023-02-22 | End: 2023-02-27

## 2023-02-22 ASSESSMENT — FIBROSIS 4 INDEX: FIB4 SCORE: 0.8

## 2023-02-22 ASSESSMENT — PAIN SCALES - GENERAL: PAINLEVEL: 10=SEVERE PAIN

## 2023-02-22 NOTE — LETTER
February 22, 2023         Patient: Diane Aguilar   YOB: 1960   Date of Visit: 2/22/2023           To Whom it May Concern:    Diane Aguilar was seen in my clinic on 2/22/2023. Please excuse from work through 2/24.     If you have any questions or concerns, please don't hesitate to call.        Sincerely,           Richie Wilkins P.A.-C.  Electronically Signed

## 2023-02-22 NOTE — PROGRESS NOTES
"Subjective:   Diane Aguilar is a 62 y.o. female who presents for Back Pain (/Fv , pain from back down to toes pt states )      HPI  The patient presents to the Urgent Care with complaints of continued right lower back pain onset 6 days ago.  Initially seen here in the urgent care on 2/20 and recommended to continue Celebrex and start muscle relaxer.  Patient reports of no improvement.  The pain has gradually worsened located to her right lower back extending to her buttocks, and radiating all the way down to her toes.  She reports of some numbness to her toes.  Pain is worse with movement, sitting down, laying down.  Having a hard time sleeping secondary to the pain.  History of RA.  Denies any saddle anesthesia, loss of bowel or bladder control.        Medications:    CALCIUM 500 PO  celecoxib Caps  cetirizine Tabs  cyclobenzaprine  Enbrel SureClick Soaj  estradiol Tabs  estradiol-norethindrone  folic acid Tabs  halobetasol  leflunomide Tabs  levothyroxine Tabs  progesterone Caps  VITAMIN D PO    Allergies: Codeine and Pcn [penicillins]    Problem List: Diane Aguilar does not have any pertinent problems on file.    Surgical History:  No past surgical history on file.    Past Social Hx: Diane Aguilar  reports that she has been smoking cigarettes. She has a 23.00 pack-year smoking history. She has never used smokeless tobacco. She reports current alcohol use. She reports that she does not use drugs.     Past Family Hx:  Diane Aguilar family history includes Lung Disease in her paternal grandmother; Other in her father.     Problem list, medications, and allergies reviewed by myself today in Epic.     Objective:     BP (!) 146/72   Pulse 70   Temp 36.3 °C (97.4 °F) (Temporal)   Resp 16   Ht 1.575 m (5' 2\")   Wt 70.3 kg (155 lb)   SpO2 94%   BMI 28.35 kg/m²     Physical Exam  Vitals reviewed.   Constitutional:       General: She is not in acute distress.     Appearance: Normal appearance. She is not " ill-appearing or toxic-appearing.   Eyes:      Conjunctiva/sclera: Conjunctivae normal.      Pupils: Pupils are equal, round, and reactive to light.   Cardiovascular:      Rate and Rhythm: Normal rate.   Pulmonary:      Effort: Pulmonary effort is normal.   Musculoskeletal:      Cervical back: Neck supple.      Comments: Back: Full range of flexion, extension, rotation, lateralization.   Tenderness to palpation to right sciatic notch.   Negative midline tenderness, bony tenderness, crepitus, deformities, or step-offs.  Positive right straight leg raise           Skin:     General: Skin is warm and dry.   Neurological:      General: No focal deficit present.      Mental Status: She is alert and oriented to person, place, and time.      Comments: Strength 5/5 bilateral lower extremities. Neurovascularly intact distally.    Psychiatric:         Mood and Affect: Mood normal.         Behavior: Behavior normal.       Diagnosis and associated orders:     1. Sciatica of right side  - predniSONE (DELTASONE) 20 MG Tab; Take 2 Tablets by mouth every day for 5 days.  Dispense: 10 Tablet; Refill: 0  - Referral to Pain Clinic       Comments/MDM:     Discussed with patient signs and symptoms consistent with right sided sciatica. Differential diagnosis of lumbar radiculopathy.   Treatment of initial rest with no heavy lifting, stooping, or strenuous activity. Massage, ice and/or heat which ever feels better, and Tylenol 500-1000 mg every 6-8 hours. Avoid NSAIDs. Start prednisone course.  Encouraged walking, stretching, and range of motion exercises as tolerated. Avoid sitting or laying down for long periods of time except for at night during sleep.   Will place referral to PMR for evaluation if no improvement in 1 week or any worsening.        I personally reviewed prior external notes and test results pertinent to today's visit. Pathogenesis of diagnosis discussed including typical length and natural progression. Supportive care,  natural history, differential diagnoses, and indications for immediate follow-up discussed. Patient expresses understanding and agrees to plan. Patient denies any other questions or concerns.     Follow-up with the primary care physician for recheck, reevaluation, and consideration of further management.    Please note that this dictation was created using voice recognition software. I have made a reasonable attempt to correct obvious errors, but I expect that there are errors of grammar and possibly content that I did not discover before finalizing the note.    This note was electronically signed by Richie Wilkins PA-C

## 2023-02-27 NOTE — PROGRESS NOTES
New Patient Note    Interventional Pain and Spine  Physiatry (Physical Medicine and Rehabilitation)     Patient Name: Diane Aguilar  : 1960  Date of Service: 2023  PCP: DANA Pretty.  Referring Provider: Richie Wilkins P.A.-*    Chief Complaint:   Chief Complaint   Patient presents with    New Patient      Sciatica of right side       HPI  HISTORY (2023):  Diane Aguilar is a 62 y.o. female who presents today with low back pain radiating down her right toes. She originally had this pain 3 months ago, without known inciting event or trauma. It had resolved, and then most recently her pain returned about 2 weeks ago. She notes weakness of her right leg, numbness in her right toes, and shooting pain and tingling in the ball of her right foot and toes. The pain hasn't changed since onset. 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    Pain right now is 9/10 on the numeric pain scale. Her pain at its best-worse level during the course of the day is typically 7-9/10, respectively.  Pain worsens with laying down and activity  and improves with nothing. The patient denies left sided symptoms.    Also notes hx of RA previously on steroids, newly on enbrel    The patient has not done physical therapy for this problem    Patient has tried the following medications with varied success (current meds in bold):   Celebrex - no relief  Prednisone - no relief  Flexeril 5mg TID - no relief    Therapeutic modalities and interventional therapies to date include:  -no injections    Medical history includes Hashimoto's thyroiditis, RA, vitamin D deficiency    Psychological testing for pain as depression and pain commonly coexist and need to both be treated.     Opioid Risk Score: 0      Interpretation of Opioid Risk Score   Score 0-3 = Low risk of abuse. Do UDS at least once per  year.  Score 4-7 = Moderate risk of abuse. Do UDS 1-4 times per year.  Score 8+ = High risk of abuse. Refer to specialist.    PHQ      8/2/2021    11:30 AM 1/26/2022     2:00 PM 2/28/2023     8:20 AM   Depression Screen (PHQ-2/PHQ-9)   PHQ-2 Total Score 0 0 1   PHQ-9 Total Score   4       Interpretation of PHQ-9 Total Score   Score Severity   1-4 No Depression   5-9 Mild Depression   10-14 Moderate Depression   15-19 Moderately Severe Depression   20-27 Severe Depression      Medical records review:  I reviewed the note from the referring provider Richie Wilkins P.A.-* including the note dated 2/22/23.    ROS:   Red Flags ROS:   Fever, Chills, Sweats: Denies  Involuntary Weight Loss: Denies  Bladder Incontinence: Denies  Bowel Incontinence: denies  Saddle Anesthesia: Denies    All other systems reviewed and negative.     PMHx:   Past Medical History:   Diagnosis Date    Seropositive rheumatoid arthritis (HCC) 3/22/2022    Thyroid disease     Tobacco dependence due to cigarettes        PSHx:   History reviewed. No pertinent surgical history.    Family Hx:   Family History   Problem Relation Age of Onset    Other Father         ALS    Lung Disease Paternal Grandmother        Social Hx:  Social History     Socioeconomic History    Marital status:      Spouse name: Not on file    Number of children: Not on file    Years of education: Not on file    Highest education level: Not on file   Occupational History    Not on file   Tobacco Use    Smoking status: Every Day     Packs/day: 1.00     Years: 23.00     Pack years: 23.00     Types: Cigarettes    Smokeless tobacco: Never   Vaping Use    Vaping Use: Never used   Substance and Sexual Activity    Alcohol use: Yes     Comment: occ    Drug use: Never    Sexual activity: Yes     Partners: Male     Birth control/protection: Post-Menopausal   Other Topics Concern    Not on file   Social History Narrative    Not on file     Social Determinants of Health     Financial  Resource Strain: Not on file   Food Insecurity: Not on file   Transportation Needs: Not on file   Physical Activity: Not on file   Stress: Not on file   Social Connections: Not on file   Intimate Partner Violence: Not on file   Housing Stability: Not on file       Allergies:  Allergies   Allergen Reactions    Codeine     Pcn [Penicillins]        Medications: reviewed on epic.   Outpatient Medications Marked as Taking for the 2/28/23 encounter (Office Visit) with Shantelle Winkler M.D.   Medication Sig Dispense Refill    gabapentin (NEURONTIN) 300 MG Cap Take 1 Capsule by mouth at bedtime. 30 Capsule 2    estradiol (ESTRACE) 1 MG Tab Take 1 mg by mouth every day.      progesterone (PROMETRIUM) 100 MG Cap TAKE 1 CAPSULE BY MOUTH ONCE DAILY FOR 30 DAYS      levothyroxine (SYNTHROID) 112 MCG Tab Take 1 Tablet by mouth every morning on an empty stomach. Take 1 tablet by mouth every morning on an empty stomach. DUE FOR THYROID LABS prior to additional refills 90 Tablet 0    Etanercept (ENBREL SURECLICK) 50 MG/ML Solution Auto-injector Inject 50 mg under the skin every 7 days. 4 Each 5    celecoxib (CELEBREX) 200 MG Cap Take 1 Capsule by mouth 2 times daily with meals as needed for Moderate Pain (for arthritis). 60 Capsule 0    leflunomide (ARAVA) 20 MG Tab Take 1 Tablet by mouth every day for 90 days. 90 Tablet 3    estradiol-norethindrone (ACTIVELLA) 1-0.5 MG per tablet Take 1 Tablet by mouth every day. 30 Tablet 1    folic acid (FOLVITE) 1 MG Tab Take 1 mg by mouth every day. for 30 days      halobetasol (ULTRAVATE) 0.05 % cream Apply  topically 2 times a day. Times 2 weeks      cetirizine (ZYRTEC) 10 MG Tab Take 4 tablets at night for 1 to 2 weeks then 1 tablet nightly for itching 30 Tablet 2    VITAMIN D PO Take  by mouth.      Calcium Carbonate (CALCIUM 500 PO) Take  by mouth.          Current Outpatient Medications on File Prior to Visit   Medication Sig Dispense Refill    estradiol (ESTRACE) 1 MG Tab Take 1 mg by  "mouth every day.      progesterone (PROMETRIUM) 100 MG Cap TAKE 1 CAPSULE BY MOUTH ONCE DAILY FOR 30 DAYS      levothyroxine (SYNTHROID) 112 MCG Tab Take 1 Tablet by mouth every morning on an empty stomach. Take 1 tablet by mouth every morning on an empty stomach. DUE FOR THYROID LABS prior to additional refills 90 Tablet 0    Etanercept (ENBREL SURECLICK) 50 MG/ML Solution Auto-injector Inject 50 mg under the skin every 7 days. 4 Each 5    celecoxib (CELEBREX) 200 MG Cap Take 1 Capsule by mouth 2 times daily with meals as needed for Moderate Pain (for arthritis). 60 Capsule 0    leflunomide (ARAVA) 20 MG Tab Take 1 Tablet by mouth every day for 90 days. 90 Tablet 3    estradiol-norethindrone (ACTIVELLA) 1-0.5 MG per tablet Take 1 Tablet by mouth every day. 30 Tablet 1    folic acid (FOLVITE) 1 MG Tab Take 1 mg by mouth every day. for 30 days      halobetasol (ULTRAVATE) 0.05 % cream Apply  topically 2 times a day. Times 2 weeks      cetirizine (ZYRTEC) 10 MG Tab Take 4 tablets at night for 1 to 2 weeks then 1 tablet nightly for itching 30 Tablet 2    VITAMIN D PO Take  by mouth.      Calcium Carbonate (CALCIUM 500 PO) Take  by mouth.      cyclobenzaprine (FLEXERIL) 5 mg tablet Take 1 Tablet by mouth 3 times a day as needed for Muscle Spasms or Moderate Pain. (Patient not taking: Reported on 2/28/2023) 10 Tablet 0     No current facility-administered medications on file prior to visit.         EXAMINATION     Physical Exam:   BP (!) 130/92 (BP Location: Right arm, Patient Position: Sitting, BP Cuff Size: Adult)   Pulse 91   Temp 36.2 °C (97.1 °F) (Temporal)   Ht 1.575 m (5' 2\")   Wt 72.3 kg (159 lb 6.3 oz)   SpO2 95%     Constitutional:   Body Habitus: Body mass index is 29.15 kg/m².  Cooperation: Fully cooperates with exam  Appearance: Well-groomed, well-nourished.    Eyes: No scleral icterus to suggest severe liver disease, no proptosis to suggest severe hyperthyroidism    ENT -no obvious auditory deficits, " no noticeable facial droop     Skin -no rashes or lesions noted     Respiratory-  breathing comfortably on room air, no audible wheezing    Cardiovascular-distal extremities warm and well perfused.  No lower extremity edema is noted.     Gastrointestinal - no obvious abdominal masses, non-distended    Psychiatric- alert and oriented ×3. Normal affect.     Gait -stable gait, antalgic favoring right leg.  No use of ambulatory device. Heel walking and toe walking intact.    Musculoskeletal and Neuro -     Thoracic/Lumbar Spine/Sacral Spine/Hips   Inspection: No evidence of atrophy in bilateral lower extremities throughout     There is full active range of motion with lumbar extension    Facet loading maneuver negative bilaterally    Palpation:   Tenderness to palpation over the  right posterolateral glute . No tenderness to palpation elsewhere in the low back/hips including midline of lumbosacral spine, paraspinal muscles bilaterally, lumbar facets bilaterally spanning approximately L1-L5 levels, sacroiliac joints bilaterally, PSIS bilaterally, and greater trochanters bilaterally.    Lumbar spine /hip provocative exam maneuvers  Straight leg raise positive on right, negative on left  FADIR test negative bilaterally    SI joint tests  JAMEL test negative bilaterally  Thigh thrust test negative bilaterally    Key points for the international standards for neurological classification of spinal cord injury (ISNCSCI) to light touch.   Dermatome R L   L2 2 2   L3 2 2   L4 2 2   L5 1*in calf 2   S1 1 2   S2 2 2       Motor Exam Lower Extremities  ? Myotome R L   Hip flexion L2 5 5   Knee extension L3 5 5   Ankle dorsiflexion L4 5 5   Toe extension L5 5 5   Ankle plantarflexion S1 5 5       Reflexes  ?  R L   Patella  2+ 2+   Achilles   2+ 2+     Clonus of the ankle negative bilaterally       MEDICAL DECISION MAKING    Medical records review: see under HPI section.     DATA    Labs: Personally reviewed at today's visit:      Lab Results   Component Value Date/Time    SODIUM 139 2022 11:39 AM    POTASSIUM 4.2 2022 11:39 AM    CHLORIDE 105 2022 11:39 AM    CO2 25 2022 11:39 AM    ANION 9.0 2022 11:39 AM    GLUCOSE 84 2022 11:39 AM    BUN 16 2022 11:39 AM    CREATININE 0.92 2022 11:39 AM    CALCIUM 9.1 2022 11:39 AM    ASTSGOT 14 2022 11:39 AM    ALTSGPT 19 2022 11:39 AM    TBILIRUBIN 0.6 2022 11:39 AM    ALBUMIN 4.4 2022 11:39 AM    TOTPROTEIN 7.1 2022 11:39 AM    GLOBULIN 2.7 2022 11:39 AM    AGRATIO 1.6 2022 11:39 AM       No results found for: PROTHROMBTM, INR     Lab Results   Component Value Date/Time    WBC 9.1 2022 11:40 AM    RBC 4.72 2022 11:40 AM    HEMOGLOBIN 15.4 2022 11:40 AM    HEMATOCRIT 46.6 2022 11:40 AM    MCV 98.7 (H) 2022 11:40 AM    MCH 32.6 2022 11:40 AM    MCHC 33.0 (L) 2022 11:40 AM    MPV 10.9 2022 11:40 AM    NEUTSPOLYS 68.30 2022 11:40 AM    LYMPHOCYTES 22.20 2022 11:40 AM    MONOCYTES 7.70 2022 11:40 AM    EOSINOPHILS 0.80 2022 11:40 AM    BASOPHILS 0.70 2022 11:40 AM        No results found for: HBA1C     Imaging:   I personally reviewed following images, these are my reads  No pertinent imaging available for review at the time of today's visit                                                      Diagnosis  Visit Diagnoses     ICD-10-CM   1. Chronic right-sided low back pain with right-sided sciatica  M54.41    G89.29   2. Acute right-sided low back pain with right-sided sciatica  M54.41   3. Lumbar radiculopathy  M54.16         ASSESSMENT AND PLAN:  Diane Aguilar ( 1960) is a female with worsening pain radiating down her right posterior thigh in predominantly the S1 and also the L5 dermatomal distribution with numbness and tingling in this area, reproduced with a positive right straight leg raise test, suggestive of right  lumbar radiculopathy.     Diane was seen today for new patient.    Diagnoses and all orders for this visit:    Chronic right-sided low back pain with right-sided sciatica  -     MR-LUMBAR SPINE-W/O; Future  -     DX-LUMBAR SPINE-2 OR 3 VIEWS; Future    Acute right-sided low back pain with right-sided sciatica  -     MR-LUMBAR SPINE-W/O; Future  -     DX-LUMBAR SPINE-2 OR 3 VIEWS; Future    Lumbar radiculopathy  -     MR-LUMBAR SPINE-W/O; Future  -     DX-LUMBAR SPINE-2 OR 3 VIEWS; Future    Other orders  -     gabapentin (NEURONTIN) 300 MG Cap; Take 1 Capsule by mouth at bedtime.          PLAN  Physical Therapy: At this time the patient's pain appears to be too severe to facilitate significant progress in PT.  Discussed that once her pain has improved, PT will likely be of significant long-term benefit especially to prevent the recurrence of her pain    Diagnostic workup: as above  -Patient has severe pain radiating down right leg in L5/S1 distribution impairing ability to walk, perform ADLs, causing her to have leg weakness and stumble, posing a fall risk.  Suspect right L5/S1 nerve impingement    Medications:   - given the neuropathic component of pain, I will start gabapentin as above   -Continue RA meds as per rheumatology  -Patient unfortunately had no relief with Celebrex, oral steroids, or Flexeril    Interventions:   -Discussed that my impression at this time is that she has right L5/S1 nerve impingement and this is seen on her MRI lumbar spine, an epidural steroid injection could be considered    Follow-up: After MRI for review    Orders Placed This Encounter    MR-LUMBAR SPINE-W/O    DX-LUMBAR SPINE-2 OR 3 VIEWS    gabapentin (NEURONTIN) 300 MG Cap       Shantelle Winkler MD  Interventional Pain and Spine  Physical Medicine and Rehabilitation  [facility]    Richie Mora, P.A.-*     The above note documents my personal evaluation of this patient. In addition, I have reviewed and confirmed with  the patient and MA the supportive information documented in today's Patient Health Questionnaire and Office Note.     Please note that this dictation was created using voice recognition software. I have made every reasonable attempt to correct obvious errors, but I expect that there are errors of grammar and possibly content that I did not discover before finalizing the note.

## 2023-02-28 ENCOUNTER — OFFICE VISIT (OUTPATIENT)
Dept: PHYSICAL MEDICINE AND REHAB | Facility: MEDICAL CENTER | Age: 63
End: 2023-02-28
Payer: COMMERCIAL

## 2023-02-28 VITALS
OXYGEN SATURATION: 95 % | BODY MASS INDEX: 29.33 KG/M2 | HEART RATE: 91 BPM | TEMPERATURE: 97.1 F | HEIGHT: 62 IN | WEIGHT: 159.39 LBS | SYSTOLIC BLOOD PRESSURE: 130 MMHG | DIASTOLIC BLOOD PRESSURE: 92 MMHG

## 2023-02-28 DIAGNOSIS — M54.16 LUMBAR RADICULOPATHY: ICD-10-CM

## 2023-02-28 DIAGNOSIS — G89.29 CHRONIC RIGHT-SIDED LOW BACK PAIN WITH RIGHT-SIDED SCIATICA: Primary | ICD-10-CM

## 2023-02-28 DIAGNOSIS — M54.41 ACUTE RIGHT-SIDED LOW BACK PAIN WITH RIGHT-SIDED SCIATICA: ICD-10-CM

## 2023-02-28 DIAGNOSIS — M54.41 CHRONIC RIGHT-SIDED LOW BACK PAIN WITH RIGHT-SIDED SCIATICA: Primary | ICD-10-CM

## 2023-02-28 PROCEDURE — 99204 OFFICE O/P NEW MOD 45 MIN: CPT | Performed by: STUDENT IN AN ORGANIZED HEALTH CARE EDUCATION/TRAINING PROGRAM

## 2023-02-28 RX ORDER — GABAPENTIN 300 MG/1
300 CAPSULE ORAL
Qty: 30 CAPSULE | Refills: 2 | Status: SHIPPED | OUTPATIENT
Start: 2023-02-28 | End: 2024-01-04

## 2023-02-28 ASSESSMENT — FIBROSIS 4 INDEX: FIB4 SCORE: 0.8

## 2023-02-28 ASSESSMENT — PATIENT HEALTH QUESTIONNAIRE - PHQ9
SUM OF ALL RESPONSES TO PHQ QUESTIONS 1-9: 4
5. POOR APPETITE OR OVEREATING: 0 - NOT AT ALL
CLINICAL INTERPRETATION OF PHQ2 SCORE: 1

## 2023-02-28 ASSESSMENT — PAIN SCALES - GENERAL: PAINLEVEL: 9=SEVERE PAIN

## 2023-03-01 ENCOUNTER — HOSPITAL ENCOUNTER (OUTPATIENT)
Dept: RADIOLOGY | Facility: MEDICAL CENTER | Age: 63
End: 2023-03-01
Attending: STUDENT IN AN ORGANIZED HEALTH CARE EDUCATION/TRAINING PROGRAM
Payer: COMMERCIAL

## 2023-03-01 DIAGNOSIS — M54.41 CHRONIC RIGHT-SIDED LOW BACK PAIN WITH RIGHT-SIDED SCIATICA: ICD-10-CM

## 2023-03-01 DIAGNOSIS — M54.16 LUMBAR RADICULOPATHY: ICD-10-CM

## 2023-03-01 DIAGNOSIS — G89.29 CHRONIC RIGHT-SIDED LOW BACK PAIN WITH RIGHT-SIDED SCIATICA: ICD-10-CM

## 2023-03-01 DIAGNOSIS — M54.41 ACUTE RIGHT-SIDED LOW BACK PAIN WITH RIGHT-SIDED SCIATICA: ICD-10-CM

## 2023-03-01 PROCEDURE — 72100 X-RAY EXAM L-S SPINE 2/3 VWS: CPT

## 2023-03-02 ENCOUNTER — TELEPHONE (OUTPATIENT)
Dept: PHYSICAL MEDICINE AND REHAB | Facility: MEDICAL CENTER | Age: 63
End: 2023-03-02
Payer: COMMERCIAL

## 2023-03-02 NOTE — TELEPHONE ENCOUNTER
Stephanie from imaging authorizations. [phone]    Pt is scheduled for MRI on March 13th. Insurance is not approving  MRI. Insurance is recommending PT or conservative treatment. If dr Winkler decides to do peer to peer to get it approved she will need to call [phone] and follow the prompts.   Per Stephanie peer to peer should be done before March 6th.  Case number [id number]

## 2023-03-03 ENCOUNTER — TELEPHONE (OUTPATIENT)
Dept: PHYSICAL MEDICINE AND REHAB | Facility: MEDICAL CENTER | Age: 63
End: 2023-03-03
Payer: COMMERCIAL

## 2023-03-03 NOTE — TELEPHONE ENCOUNTER
Can you please contact the patient to let her know that we have obtained insurance authorization for her MRI on 3/13? Thanks

## 2023-03-03 NOTE — TELEPHONE ENCOUNTER
Spoke with patient's insurance company. Obtained authorization for MRI lumbar spine to be performed at [facility] anytime 3/2/23-3/31/23   Order #[id number]    Shantelle Winkler MD  Interventional Pain and Spine  Physical Medicine and Rehabilitation  [facility]

## 2023-03-03 NOTE — TELEPHONE ENCOUNTER
Caller Name: Diane Aguilar  Call Back Number:     How would the patient prefer to be contacted with a response: Phone call OK to leave a detailed message    Pt called to request that we complete an authorization/P2P call so that she may complete the MRI ordered by Dr. Winkler. I told pt that P2P calls are usually not necessary for imaging requests. Pt actually already has the MRI scheduled for 3/13/23. Please advise.

## 2023-03-10 ENCOUNTER — OFFICE VISIT (OUTPATIENT)
Dept: PHYSICAL MEDICINE AND REHAB | Facility: MEDICAL CENTER | Age: 63
End: 2023-03-10
Payer: COMMERCIAL

## 2023-03-10 ENCOUNTER — HOSPITAL ENCOUNTER (OUTPATIENT)
Dept: RADIOLOGY | Facility: MEDICAL CENTER | Age: 63
End: 2023-03-10
Attending: STUDENT IN AN ORGANIZED HEALTH CARE EDUCATION/TRAINING PROGRAM

## 2023-03-10 VITALS
TEMPERATURE: 96.6 F | HEART RATE: 89 BPM | SYSTOLIC BLOOD PRESSURE: 132 MMHG | WEIGHT: 155.2 LBS | DIASTOLIC BLOOD PRESSURE: 90 MMHG | OXYGEN SATURATION: 95 % | BODY MASS INDEX: 28.56 KG/M2 | HEIGHT: 62 IN

## 2023-03-10 DIAGNOSIS — M54.41 CHRONIC RIGHT-SIDED LOW BACK PAIN WITH RIGHT-SIDED SCIATICA: ICD-10-CM

## 2023-03-10 DIAGNOSIS — M54.41 ACUTE RIGHT-SIDED LOW BACK PAIN WITH RIGHT-SIDED SCIATICA: ICD-10-CM

## 2023-03-10 DIAGNOSIS — G89.29 CHRONIC RIGHT-SIDED LOW BACK PAIN WITH RIGHT-SIDED SCIATICA: ICD-10-CM

## 2023-03-10 DIAGNOSIS — M54.16 LUMBAR RADICULOPATHY: Primary | ICD-10-CM

## 2023-03-10 PROCEDURE — 99214 OFFICE O/P EST MOD 30 MIN: CPT | Performed by: STUDENT IN AN ORGANIZED HEALTH CARE EDUCATION/TRAINING PROGRAM

## 2023-03-10 ASSESSMENT — FIBROSIS 4 INDEX: FIB4 SCORE: 0.8

## 2023-03-10 ASSESSMENT — PAIN SCALES - GENERAL: PAINLEVEL: 6=MODERATE PAIN

## 2023-03-10 ASSESSMENT — PATIENT HEALTH QUESTIONNAIRE - PHQ9: CLINICAL INTERPRETATION OF PHQ2 SCORE: 0

## 2023-03-10 NOTE — H&P (VIEW-ONLY)
Follow-up patient Note    Interventional Pain and Spine  Physiatry (Physical Medicine and Rehabilitation)     Patient Name: Diane Aguilar  : 1960  Date of service: 3/10/2023    Chief Complaint:   Chief Complaint   Patient presents with    Follow-Up     Chronic right-sided low back pain with right-sided sciatica       HISTORY  Please see new patient note by Dr. Winkler for more details.     HPI  Today's visit   Diane Aguilar ( 1960) is a female with The primary encounter diagnosis was Lumbar radiculopathy. Diagnoses of Chronic right-sided low back pain with right-sided sciatica and Acute right-sided low back pain with right-sided sciatica were also pertinent to this visit.    Today Diane notes slight improvement in pain since last visit although still very severe radiating pain from her right low back down her right toes, significantly impairing her ability to perform ADLs or sit. Pain wakes her up at night.  She notes weakness of her right leg, numbness in her right toes, and shooting pain and tingling in the ball of her right foot and toes. Taking gabapentin QHS with some improvement in pain but still waking up.      Pain severity 6-7/10 currently    ROS:   Red Flags ROS:   Fever, Chills, Sweats: Denies  Involuntary Weight Loss: Denies  Bladder Incontinence: Denies  Bowel Incontinence: denies  Saddle Anesthesia: Denies    All other systems reviewed and negative.     PMHx:   Past Medical History:   Diagnosis Date    Seropositive rheumatoid arthritis (HCC) 3/22/2022    Thyroid disease     Tobacco dependence due to cigarettes        PSHx:   History reviewed. No pertinent surgical history.    Family Hx:   Family History   Problem Relation Age of Onset    Other Father         ALS    Lung Disease Paternal Grandmother        Social Hx:  Social History     Socioeconomic History    Marital status:      Spouse name: Not on file    Number of children: Not on file    Years of education: Not on file     Highest education level: Not on file   Occupational History    Not on file   Tobacco Use    Smoking status: Every Day     Packs/day: 1.00     Years: 23.00     Pack years: 23.00     Types: Cigarettes    Smokeless tobacco: Never   Vaping Use    Vaping Use: Never used   Substance and Sexual Activity    Alcohol use: Yes     Comment: occ    Drug use: Never    Sexual activity: Yes     Partners: Male     Birth control/protection: Post-Menopausal   Other Topics Concern    Not on file   Social History Narrative    Not on file     Social Determinants of Health     Financial Resource Strain: Not on file   Food Insecurity: Not on file   Transportation Needs: Not on file   Physical Activity: Not on file   Stress: Not on file   Social Connections: Not on file   Intimate Partner Violence: Not on file   Housing Stability: Not on file       Allergies:  Allergies   Allergen Reactions    Codeine     Pcn [Penicillins]        Medications: reviewed on epic.   Outpatient Medications Marked as Taking for the 3/10/23 encounter (Office Visit) with Shantelle Winkler M.D.   Medication Sig Dispense Refill    gabapentin (NEURONTIN) 300 MG Cap Take 1 Capsule by mouth at bedtime. 30 Capsule 2    estradiol (ESTRACE) 1 MG Tab Take 1 mg by mouth every day.      progesterone (PROMETRIUM) 100 MG Cap TAKE 1 CAPSULE BY MOUTH ONCE DAILY FOR 30 DAYS      levothyroxine (SYNTHROID) 112 MCG Tab Take 1 Tablet by mouth every morning on an empty stomach. Take 1 tablet by mouth every morning on an empty stomach. DUE FOR THYROID LABS prior to additional refills 90 Tablet 0    Etanercept (ENBREL SURECLICK) 50 MG/ML Solution Auto-injector Inject 50 mg under the skin every 7 days. 4 Each 5    celecoxib (CELEBREX) 200 MG Cap Take 1 Capsule by mouth 2 times daily with meals as needed for Moderate Pain (for arthritis). 60 Capsule 0    estradiol-norethindrone (ACTIVELLA) 1-0.5 MG per tablet Take 1 Tablet by mouth every day. 30 Tablet 1    folic acid (FOLVITE) 1 MG Tab Take  1 mg by mouth every day. for 30 days      halobetasol (ULTRAVATE) 0.05 % cream Apply  topically 2 times a day. Times 2 weeks      cetirizine (ZYRTEC) 10 MG Tab Take 4 tablets at night for 1 to 2 weeks then 1 tablet nightly for itching 30 Tablet 2    VITAMIN D PO Take  by mouth.      Calcium Carbonate (CALCIUM 500 PO) Take  by mouth.          Current Outpatient Medications on File Prior to Visit   Medication Sig Dispense Refill    gabapentin (NEURONTIN) 300 MG Cap Take 1 Capsule by mouth at bedtime. 30 Capsule 2    estradiol (ESTRACE) 1 MG Tab Take 1 mg by mouth every day.      progesterone (PROMETRIUM) 100 MG Cap TAKE 1 CAPSULE BY MOUTH ONCE DAILY FOR 30 DAYS      levothyroxine (SYNTHROID) 112 MCG Tab Take 1 Tablet by mouth every morning on an empty stomach. Take 1 tablet by mouth every morning on an empty stomach. DUE FOR THYROID LABS prior to additional refills 90 Tablet 0    Etanercept (ENBREL SURECLICK) 50 MG/ML Solution Auto-injector Inject 50 mg under the skin every 7 days. 4 Each 5    celecoxib (CELEBREX) 200 MG Cap Take 1 Capsule by mouth 2 times daily with meals as needed for Moderate Pain (for arthritis). 60 Capsule 0    estradiol-norethindrone (ACTIVELLA) 1-0.5 MG per tablet Take 1 Tablet by mouth every day. 30 Tablet 1    folic acid (FOLVITE) 1 MG Tab Take 1 mg by mouth every day. for 30 days      halobetasol (ULTRAVATE) 0.05 % cream Apply  topically 2 times a day. Times 2 weeks      cetirizine (ZYRTEC) 10 MG Tab Take 4 tablets at night for 1 to 2 weeks then 1 tablet nightly for itching 30 Tablet 2    VITAMIN D PO Take  by mouth.      Calcium Carbonate (CALCIUM 500 PO) Take  by mouth.      cyclobenzaprine (FLEXERIL) 5 mg tablet Take 1 Tablet by mouth 3 times a day as needed for Muscle Spasms or Moderate Pain. (Patient not taking: Reported on 2/28/2023) 10 Tablet 0     No current facility-administered medications on file prior to visit.         EXAMINATION     Physical Exam:   BP (!) 132/90 (BP Location:  "Right arm, Patient Position: Sitting, BP Cuff Size: Adult)   Pulse 89   Temp 35.9 °C (96.6 °F) (Temporal)   Ht 1.575 m (5' 2\")   Wt 70.4 kg (155 lb 3.3 oz)   SpO2 95%     Constitutional:   Body Habitus: Body mass index is 28.39 kg/m².  Cooperation: Fully cooperates with exam  Appearance: Well-groomed, well-nourished.    Eyes: No scleral icterus to suggest severe liver disease, no proptosis to suggest severe hyperthyroidism    ENT -no obvious auditory deficits, no noticeable facial droop     Skin -no rashes or lesions noted     Respiratory-  breathing comfortably on room air, no audible wheezing    Cardiovascular-distal extremities warm and well perfused.  No lower extremity edema is noted.     Gastrointestinal - no obvious abdominal masses, non-distended    Psychiatric- alert and oriented ×3. Normal affect.       Gait -stable gait, antalgic favoring right leg.  No use of ambulatory device.      Musculoskeletal and Neuro -      Thoracic/Lumbar Spine/Sacral Spine/Hips   Inspection: No evidence of atrophy in bilateral lower extremities throughout      There is full active range of motion with lumbar extension     Facet loading maneuver negative bilaterally     Palpation:   Tenderness to palpation over the  right posterolateral glute . No tenderness to palpation elsewhere in the low back/hips including midline of lumbosacral spine, paraspinal muscles bilaterally, lumbar facets bilaterally spanning approximately L1-L5 levels, sacroiliac joints bilaterally, PSIS bilaterally, and greater trochanters bilaterally.     Lumbar spine /hip provocative exam maneuvers  Straight leg raise positive on right, negative on left  FADIR test negative bilaterally     SI joint tests  JAMEL test negative bilaterally  Thigh thrust test negative bilaterally     Key points for the international standards for neurological classification of spinal cord injury (ISNCSCI) to light touch.   Dermatome R L   L2 2 2   L3 2 2   L4 2 2   L5 1*in " calf 2   S1 1 2   S2 2 2         Motor Exam Lower Extremities  ? Myotome R L   Hip flexion L2 5 5   Knee extension L3 5 5   Ankle dorsiflexion L4 5 5   Toe extension L5 5 5   Ankle plantarflexion S1 5 5      Previous exam  Heel walking and toe walking intact.    Reflexes  ?   R L   Patella   2+ 2+   Achilles    2+ 2+      Clonus of the ankle negative bilaterally            MEDICAL DECISION MAKING    Medical records review: see under HPI section.     DATA    Labs: No new labs available for review since last visit.   Lab Results   Component Value Date/Time    SODIUM 139 12/14/2022 11:39 AM    POTASSIUM 4.2 12/14/2022 11:39 AM    CHLORIDE 105 12/14/2022 11:39 AM    CO2 25 12/14/2022 11:39 AM    ANION 9.0 12/14/2022 11:39 AM    GLUCOSE 84 12/14/2022 11:39 AM    BUN 16 12/14/2022 11:39 AM    CREATININE 0.92 12/14/2022 11:39 AM    CALCIUM 9.1 12/14/2022 11:39 AM    ASTSGOT 14 12/14/2022 11:39 AM    ALTSGPT 19 12/14/2022 11:39 AM    TBILIRUBIN 0.6 12/14/2022 11:39 AM    ALBUMIN 4.4 12/14/2022 11:39 AM    TOTPROTEIN 7.1 12/14/2022 11:39 AM    GLOBULIN 2.7 12/14/2022 11:39 AM    AGRATIO 1.6 12/14/2022 11:39 AM       No results found for: PROTHROMBTM, INR     Lab Results   Component Value Date/Time    WBC 9.1 12/14/2022 11:40 AM    RBC 4.72 12/14/2022 11:40 AM    HEMOGLOBIN 15.4 12/14/2022 11:40 AM    HEMATOCRIT 46.6 12/14/2022 11:40 AM    MCV 98.7 (H) 12/14/2022 11:40 AM    MCH 32.6 12/14/2022 11:40 AM    MCHC 33.0 (L) 12/14/2022 11:40 AM    MPV 10.9 12/14/2022 11:40 AM    NEUTSPOLYS 68.30 12/14/2022 11:40 AM    LYMPHOCYTES 22.20 12/14/2022 11:40 AM    MONOCYTES 7.70 12/14/2022 11:40 AM    EOSINOPHILS 0.80 12/14/2022 11:40 AM    BASOPHILS 0.70 12/14/2022 11:40 AM        No results found for: HBA1C     Imaging:   I personally reviewed following images, these are my reads  MRI lumbar spine 3/8/2023  Transitional anatomy.  Mild bilateral neuroforaminal stenosis at L4-5 with mild to moderate central canal stenosis at this level.   Mild central canal stenosis at L2-3 and L3-4 with mild bilateral neuroforaminal stenosis at L2-3 and left L3-4.  Facet arthropathy most notable at bilateral L2-3, L3-4 and L4-5.See formal radiology report for further details.      IMAGING radiology reads. I reviewed the following radiology reads   MRI lumbar spine 3/8/2023                               Results for orders placed during the hospital encounter of 23    DX-LUMBAR SPINE-2 OR 3 VIEWS    Impression  1.  There is a mild scoliosis of the lumbar spine with no significant spondylolisthesis.  2.  There is mild degenerative disc disease and arthropathy in the lower lumbar spine.  3.  There is transitional anatomy with partial sacralization of the lowest lumbar vertebral body.                         Diagnosis  Visit Diagnoses     ICD-10-CM   1. Lumbar radiculopathy  M54.16   2. Chronic right-sided low back pain with right-sided sciatica  M54.41    G89.29   3. Acute right-sided low back pain with right-sided sciatica  M54.41         ASSESSMENT AND PLAN:  Diane Aguilar (: 1960) is a female with      Diane was seen today for follow-up.    Diagnoses and all orders for this visit:    Lumbar radiculopathy  -     Referral to Pain Clinic    Chronic right-sided low back pain with right-sided sciatica    Acute right-sided low back pain with right-sided sciatica      PLAN  Physical Therapy: At this time the patient's pain appears to be too severe to facilitate significant progress in PT.  Discussed that once her pain has improved, PT will likely be of significant long-term benefit especially to prevent the recurrence of her pain     Diagnostic workup: Personally reviewed at today's visit:  MRI lumbar spine 3/8/2023    Medications:   -Continue gabapentin as previously prescribed  -Continue RA meds as per rheumatology  -Patient unfortunately had no relief with Celebrex, oral steroids, or Flexeril     Interventions:   -Caudal epidural steroid injection with  catheter. The risks, benefits, and alternatives to this procedure were discussed and the patient wishes to proceed with the procedure. Risks include but are not limited to damage to surrounding structures, infection, bleeding, worsening of pain which can be permanent, and weakness which can be permanent. Benefits include pain relief and improved function. Alternatives include not doing the procedure.      Follow-up: 1 month after procedure    Orders Placed This Encounter    Referral to Pain Clinic       Shantelle Winkler MD  Interventional Pain and Spine  Physical Medicine and Rehabilitation  [facility]      The above note documents my personal evaluation of this patient. In addition, I have reviewed and confirmed with the patient and MA the supportive information documented in today's Patient Health Questionnaire and Office Note.     Please note that this dictation was created using voice recognition software. I have made every reasonable attempt to correct obvious errors, but I expect that there are errors of grammar and possibly content that I did not discover before finalizing the note.

## 2023-03-21 ENCOUNTER — OFFICE VISIT (OUTPATIENT)
Dept: RHEUMATOLOGY | Facility: MEDICAL CENTER | Age: 63
End: 2023-03-21
Attending: STUDENT IN AN ORGANIZED HEALTH CARE EDUCATION/TRAINING PROGRAM
Payer: COMMERCIAL

## 2023-03-21 ENCOUNTER — HOSPITAL ENCOUNTER (OUTPATIENT)
Facility: REHABILITATION | Age: 63
End: 2023-03-21
Attending: STUDENT IN AN ORGANIZED HEALTH CARE EDUCATION/TRAINING PROGRAM | Admitting: STUDENT IN AN ORGANIZED HEALTH CARE EDUCATION/TRAINING PROGRAM
Payer: COMMERCIAL

## 2023-03-21 ENCOUNTER — APPOINTMENT (OUTPATIENT)
Dept: RADIOLOGY | Facility: REHABILITATION | Age: 63
End: 2023-03-21
Attending: STUDENT IN AN ORGANIZED HEALTH CARE EDUCATION/TRAINING PROGRAM
Payer: COMMERCIAL

## 2023-03-21 VITALS
BODY MASS INDEX: 28.71 KG/M2 | OXYGEN SATURATION: 93 % | WEIGHT: 156 LBS | HEIGHT: 62 IN | SYSTOLIC BLOOD PRESSURE: 104 MMHG | HEART RATE: 93 BPM | DIASTOLIC BLOOD PRESSURE: 68 MMHG

## 2023-03-21 VITALS
OXYGEN SATURATION: 94 % | WEIGHT: 152.12 LBS | SYSTOLIC BLOOD PRESSURE: 151 MMHG | HEART RATE: 78 BPM | HEIGHT: 62 IN | DIASTOLIC BLOOD PRESSURE: 97 MMHG | RESPIRATION RATE: 16 BRPM | BODY MASS INDEX: 27.99 KG/M2 | TEMPERATURE: 98.4 F

## 2023-03-21 DIAGNOSIS — M05.9 SEROPOSITIVE RHEUMATOID ARTHRITIS (HCC): ICD-10-CM

## 2023-03-21 DIAGNOSIS — M15.9 PRIMARY OSTEOARTHRITIS INVOLVING MULTIPLE JOINTS: ICD-10-CM

## 2023-03-21 DIAGNOSIS — D84.9 IMMUNOSUPPRESSED STATUS (HCC): ICD-10-CM

## 2023-03-21 PROCEDURE — 99212 OFFICE O/P EST SF 10 MIN: CPT

## 2023-03-21 PROCEDURE — 62323 NJX INTERLAMINAR LMBR/SAC: CPT

## 2023-03-21 PROCEDURE — 700111 HCHG RX REV CODE 636 W/ 250 OVERRIDE (IP)

## 2023-03-21 PROCEDURE — 99214 OFFICE O/P EST MOD 30 MIN: CPT | Performed by: STUDENT IN AN ORGANIZED HEALTH CARE EDUCATION/TRAINING PROGRAM

## 2023-03-21 PROCEDURE — 700117 HCHG RX CONTRAST REV CODE 255

## 2023-03-21 RX ORDER — LIDOCAINE HYDROCHLORIDE 10 MG/ML
INJECTION, SOLUTION EPIDURAL; INFILTRATION; INTRACAUDAL; PERINEURAL
Status: COMPLETED
Start: 2023-03-21 | End: 2023-03-21

## 2023-03-21 RX ORDER — DEXAMETHASONE SODIUM PHOSPHATE 10 MG/ML
INJECTION, SOLUTION INTRAMUSCULAR; INTRAVENOUS
Status: COMPLETED
Start: 2023-03-21 | End: 2023-03-21

## 2023-03-21 RX ADMIN — DEXAMETHASONE SODIUM PHOSPHATE 10 MG: 10 INJECTION INTRAMUSCULAR; INTRAVENOUS at 14:45

## 2023-03-21 RX ADMIN — LIDOCAINE HYDROCHLORIDE 10 ML: 10 INJECTION, SOLUTION EPIDURAL; INFILTRATION; INTRACAUDAL; PERINEURAL at 14:44

## 2023-03-21 RX ADMIN — IOHEXOL 3 ML: 240 INJECTION, SOLUTION INTRATHECAL; INTRAVASCULAR; INTRAVENOUS; ORAL at 14:45

## 2023-03-21 ASSESSMENT — PAIN DESCRIPTION - PAIN TYPE
TYPE: CHRONIC PAIN
TYPE: CHRONIC PAIN

## 2023-03-21 ASSESSMENT — FIBROSIS 4 INDEX
FIB4 SCORE: 0.8
FIB4 SCORE: 0.8

## 2023-03-21 NOTE — INTERVAL H&P NOTE
H&P reviewed. The patient was examined and there are no changes to the H&P    Shantelle Winkler MD  Interventional Pain and Spine  Physical Medicine and Rehabilitation  [facility]

## 2023-03-21 NOTE — OP REPORT
Date of Service: 3/21/2023     Patient: Diane Aguilar 62 y.o. female     MRN: [id number]     Physician/s: Shantelle Winkler MD    Pre-operative Diagnosis: Lumbar radiculopathy    Post-operative Diagnosis: Lumbar radiculopathy    Procedure: Caudal Epidural Steroid Injection with catheter    Description of procedure:    The risks, benefits, and alternatives of the procedure were reviewed and discussed with the patient.  Written informed consent was freely obtained. A pre-procedural time-out was conducted by the physician verifying patient’s identity, procedure to be performed, procedure site and side, and allergy verification. Appropriate equipment was determined to be in place for the procedure.     The patient's vital signs were carefully monitored before, throughout, and after the procedure.     The patient was placed in the prone position, and fluoroscopy was used to identify the approximate area of the sacral hiatus.  This was also determined by physical examination and palpation of the sacral cornu, bilaterally.  The area surrounding the sacral hiatus was prepped with chlorhexidine solution and draped with a sterile drape.  Sterile technique was used throughout.       The skin and subcutaneous tissues overlying the sacral hiatus were infiltrated with 1% lidocaine.  A 18-gauge Tuohy needle was advanced through the sacral hiatus into the caudal spinal canal.  Placement of the epidural needle in the spinal canal was confirmed with AP and lateral fluoroscopic images.  A radiopaque epidural catheter (Eggs Overnight, GrayBug) was advanced through the epidural needle with intermittent fluoroscopic imaging and was brought to the L5-S1 level slightly to the right of midline.     Omnipaque was injected to confirm location in the lateral and AP positions. Following negative aspiration, dexamethasone 10 mg with lidocaine 10 mg and 1.5 mL normal saline (total volume 3.5 mL) were injected through the epidural catheter. The needle and  catheter were removed intact. The patient's skin was wiped with a 4x4 gauze, the area was cleansed with alcohol prep, and a bandaid was applied.      The procedure was well tolerated, and there were no apparent complications.    The patient was then evaluated post-procedure, and was hemodynamically stable prior to leaving the post-operative care unit.     Follow-up as scheduled    Shantelle Winkler MD  Interventional Pain and Spine  Physical Medicine and Rehabilitation  [facility]      CPT codes  Interlaminar epidural injection - lumbar or sacral (caudal): 16766

## 2023-03-21 NOTE — PROGRESS NOTES
[facility] RHEUMATOLOGY  [address], [address], [place] [address]  Phone: [phone] ? Fax: [phone]    RHEUMATOLOGY FOLLOW-UP VISIT NOTE      DATE OF SERVICE: 03/21/2023         Subjective     PRIMARY CARE PRACTITIONER:  TOM Pretty  [address] [place] [address]    PATIENT IDENTIFICATION:  Diane Aguilar  [address] [place] [address]    YOB: 1960    MEDICAL RECORD NUMBER: [id number]          CHIEF COMPLAINT:   Chief Complaint   Patient presents with    Follow-Up     Seropositive RA. Last seen 01/17/23    Results     Labs 01/19/23       RHEUMATOLOGIC HISTORY:  Diane Aguilar is a 62 y.o. female with pertinent history notable for seropositive rheumatoid arthritis diagnosed in 3/2022, osteoarthritis of multiple joints (hands and feet), and Hashimoto's thyroiditis with hypothyroidism. She initially presented on 3/22/2022, reported onset of polyarthralgia in 7/2021 preceded by hypersensitivity rash from weeding at her yard. Since then she had been treated with several one-week courses of steroids and was on prednisone 2.5 mg daily when evaluated. With each course of steroid she experienced improvement but flared soon after completing the treatment. In addition, she has been managing with meloxicam 7.5 mg daily which she had been taken for a long time. She reported ongoing pain/swelling in her hands, wrists, and feet, as well as pain in her lower back and hips, associated with morning stiffness lasting all day and improving with activity.     Pertinent treatment history: Prednisone 30>20 mg tapers during flares (on/off since 3/2022, effective), methotrexate 15-20 mg weekly (3/2022-11/2022, ineffective), leflunomide 20 mg daily (11/2022-4/2023, ineffective), Enbrel 50 mg SC weekly (1/2023-present, very effective).     Pertinent laboratory results: Positive RF 80, anti-CCP 81, and JOREG 1:320 homogenous/cytoplasmic patterns with negative reflex panel (in  3/2022); positive anti-TPO 51.2 and anti-.8 (in 7/2021); negative/normal HLA-B27 (in 10/2022), vitamin D 49<26 (in 12/2022), QTB, HBV and HCV (in 1/2023), ESR, CRP, eGFR, creatinine, LFTs, and unremarkable CBC (in 12/2022).     Pertinent XR imaging as of 3/2022: Hands with typical osteoarthritis of bilateral 1st carpometacarpal joint and distal interphalangeal joint. Feet with postoperative changes of the right 5th proximal phalanx in the left 1st metatarsal head, osteoarthritis of both 1st metatarsophalangeal joint, and right accessory navicular, anatomic variant.    INTERVAL HISTORY:  Doing quite well with no significant symptoms.  Feels that Enbrel has been very helpful.    REVIEW OF SYSTEMS:  Except as noted in the history above, relevant review of systems with emphasis on autoimmune rheumatic diseases was otherwise negative.      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 No pertinent surgical history.    MEDICATIONS:  Current Outpatient Medications   Medication Sig    gabapentin (NEURONTIN) 300 MG Cap Take 1 Capsule by mouth at bedtime.    levothyroxine (SYNTHROID) 112 MCG Tab Take 1 Tablet by mouth  "every morning on an empty stomach. Take 1 tablet by mouth every morning on an empty stomach. DUE FOR THYROID LABS prior to additional refills    Etanercept (ENBREL SURECLICK) 50 MG/ML Solution Auto-injector Inject 50 mg under the skin every 7 days.    estradiol-norethindrone (ACTIVELLA) 1-0.5 MG per tablet Take 1 Tablet by mouth every day.    cetirizine (ZYRTEC) 10 MG Tab Take 4 tablets at night for 1 to 2 weeks then 1 tablet nightly for itching    VITAMIN D PO Take  by mouth.    Calcium Carbonate (CALCIUM 500 PO) Take  by mouth.       ALLERGIES:   Allergies   Allergen Reactions    Codeine     Pcn [Penicillins]        IMMUNIZATIONS:  Immunization History   Administered Date(s) Administered    Influenza Seasonal Injectable - Historical Data 12/18/2012    Influenza Vac Subunit Quad Inj (Pf) 10/15/2018    Influenza Vaccine Quad Inj (Pf) 10/07/2017, 10/18/2019    Influenza Vaccine Quad Recombinant 10/06/2020, 11/18/2021    MODERNA SARS-COV-2 VACCINE (12+) 06/04/2021, 07/02/2021    Zoster Vaccine Live (ZVL) (Zostavax) - HISTORICAL DATA 09/20/2016       SOCIAL HISTORY:   Social History     Socioeconomic History    Marital status:    Tobacco Use    Smoking status: Every Day     Packs/day: 1.00     Years: 23.00     Pack years: 23.00     Types: Cigarettes    Smokeless tobacco: Never   Vaping Use    Vaping Use: Never used   Substance and Sexual Activity    Alcohol use: Yes     Comment: occ    Drug use: Never    Sexual activity: Yes     Partners: Male     Birth control/protection: Post-Menopausal       FAMILY HISTORY:  Family History   Problem Relation Age of Onset    Other Father         ALS    Lung Disease Paternal Grandmother             Objective     Vital Signs: /68 (BP Location: Left arm, Patient Position: Sitting, BP Cuff Size: Adult)   Pulse 93   Ht 1.575 m (5' 2\")   Wt 70.8 kg (156 lb)   SpO2 93% Body mass index is 28.53 kg/m².    General: Appears well and comfortable  Eyes: No scleral or " conjunctival lesions  ENT: No apparent oral or nasal lesions  Head/Neck: No apparent scalp or neck lesions  Cardiovascular: Regular rate and rhythm  Respiratory: Breathing quiet and unlabored  Gastrointestinal: No organomegaly or abdominal masses  Integumentary: No significant cutaneous lesions or discolorations  Musculoskeletal: No significant joint tenderness, periarticular soft tissue swelling, warmth, erythema, or overt signs of synovitis; no significant restriction in range of motion of joints examined  Neurologic: No focal sensory or motor deficits  Psychiatric: Mood and affect appropriate      LABORATORY RESULTS REVIEWED AND INTERPRETED BY ME:  Lab Results   Component Value Date/Time    SEDRATEWES 6 12/14/2022 11:40 AM    CREACTPROT <0.30 12/14/2022 11:40 AM     Lab Results   Component Value Date/Time    RHEUMFACTN 80 (H) 03/08/2022 02:32 PM    CCPANTIBODY 81 (H) 03/23/2022 09:46 AM    EKWW05CFKN Negative 10/26/2022 03:55 PM     Lab Results   Component Value Date/Time    ANTINUCAB Detected (A) 03/23/2022 09:46 AM     Lab Results   Component Value Date/Time    ANTIDNADS 15 03/23/2022 09:46 AM    SMITHAB 3 03/23/2022 09:46 AM    RNPAB 7 03/23/2022 09:46 AM    DLMPQBV74 9 03/23/2022 09:46 AM    SSA60 3 03/23/2022 09:46 AM    SSA52 1 03/23/2022 09:46 AM    ANTISSBSJ 0 03/23/2022 09:46 AM    JO1AB 1 03/23/2022 09:46 AM     Lab Results   Component Value Date/Time    MICROSOMALA 51.2 (H) 07/24/2021 09:09 AM    ANTITHYROGL 286.8 (H) 07/24/2021 09:09 AM    TSHULTRASEN 1.300 12/14/2022 11:39 AM     Lab Results   Component Value Date/Time    ASTSGOT 14 12/14/2022 11:39 AM    ALTSGPT 19 12/14/2022 11:39 AM    ALKPHOSPHAT 66 12/14/2022 11:39 AM    TBILIRUBIN 0.6 12/14/2022 11:39 AM    TOTPROTEIN 7.1 12/14/2022 11:39 AM    ALBUMIN 4.4 12/14/2022 11:39 AM     Lab Results   Component Value Date/Time    SODIUM 139 12/14/2022 11:39 AM    POTASSIUM 4.2 12/14/2022 11:39 AM    CHLORIDE 105 12/14/2022 11:39 AM    CO2 25 12/14/2022  11:39 AM    GLUCOSE 84 12/14/2022 11:39 AM    BUN 16 12/14/2022 11:39 AM    CREATININE 0.92 12/14/2022 11:39 AM    CALCIUM 9.1 12/14/2022 11:39 AM     Lab Results   Component Value Date/Time    WBC 9.1 12/14/2022 11:40 AM    RBC 4.72 12/14/2022 11:40 AM    HEMOGLOBIN 15.4 12/14/2022 11:40 AM    HEMATOCRIT 46.6 12/14/2022 11:40 AM    MCV 98.7 (H) 12/14/2022 11:40 AM    MCH 32.6 12/14/2022 11:40 AM    MCHC 33.0 (L) 12/14/2022 11:40 AM    RDW 51.2 (H) 12/14/2022 11:40 AM    PLATELETCT 244 12/14/2022 11:40 AM    MPV 10.9 12/14/2022 11:40 AM    NEUTS 6.21 12/14/2022 11:40 AM    LYMPHOCYTES 22.20 12/14/2022 11:40 AM    MONOCYTES 7.70 12/14/2022 11:40 AM    EOSINOPHILS 0.80 12/14/2022 11:40 AM    BASOPHILS 0.70 12/14/2022 11:40 AM     Lab Results   Component Value Date/Time    25HYDROXY 49 12/14/2022 11:39 AM     Lab Results   Component Value Date/Time    CHOLSTRLTOT 203 (H) 12/14/2022 11:39 AM     (H) 12/14/2022 11:39 AM    HDL 85 12/14/2022 11:39 AM    TRIGLYCERIDE 72 12/14/2022 11:39 AM       RADIOLOGY RESULTS REVIEWED AND INTERPRETED BY ME:  Results for orders placed in visit on 03/23/22    DX-JOINT SURVEY-FEET SINGLE VIEW    Impression  1.  No evidence for inflammatory arthritis    2.  Postoperative changes of the right 5th proximal phalanx in the left 1st metatarsal head    3.  osteoarthritis of both 1st metatarsophalangeal joint    4.  Right accessory navicular, anatomic variant    Results for orders placed in visit on 03/23/22    DX-JOINT SURVEY-HANDS SINGLE VIEW    Impression  1.  No evidence for erosive arthritis    2.  Typical osteoarthritis of bilateral 1st carpometacarpal joint and distal interphalangeal joint      All relevant laboratory and imaging results reported on this note were reviewed and interpreted by me.         Assessment & Plan     Diane Aguilar is a 62 y.o. female with history as noted above whose presentation merits the following diagnostic and clinical status impressions and  recommendations:    1. Seropositive rheumatoid arthritis (HCC)  Clinically very low disease activity well-controlled on the current regimen of Enbrel, so okay to discontinue leflunomide after finishing the current supply as it was not really helpful. Given the potential for discordance between immunologic activity and clinical disease manifestations, need to routinely monitor serologic markers of disease activity for their trends over time.  - Sed Rate; Future  - CRP QUANTITIVE (NON-CARDIAC); Future  - Continue Enbrel 50 mg SC weekly  - Discontinue leflunomide 20 mg daily after finishing the current supply    2. Primary osteoarthritis involving multiple joints  Presumably a significant contributor to her overall joint pain burden which can be managed supportively with topical or oral NSAIDs and analgesics as needed.  - Consider intra-articular steroid injection if it becomes necessary    3. Immunosuppressed status  Presently with no reported history, physical exam, or laboratory evidence to suggest significant adverse drug effects or opportunistic infections, but need routine monitoring per guidelines.  - CBC WITH DIFFERENTIAL; Future  - Comp Metabolic Panel; Future  - Need to ascertain age-appropriate vaccines to include Shingrix      FOLLOW-UP: Return in about 4 months (around 7/21/2023) for Short.         Thank you for the opportunity to participate in the care of Diane Aguilar.    Josr Mayer MD, MS  Rheumatologist, [facility] Rheumatology ? [facility]   of Clinical Medicine, Department of Internal Medicine  [facility] ? [facility]

## 2023-03-21 NOTE — PROGRESS NOTES
1455 received pt from procedure room via ambulation. Vital signs stable, tolerating fluids  1458 Dr Winkler in to see patient, meets discharge criteria. Site CDI, ice pack applied.  1510 Pt dc'ed with designated , placed in passengers seat

## 2023-03-21 NOTE — PATIENT INSTRUCTIONS
AFTER VISIT INSTRUCTIONS    Below are important information to help you navigate your healthcare needs and help us serve you safely and effectively:  If laboratory tests and/or imaging studies were ordered, remember to go get them done as instructed.  If new prescriptions and/or refills were sent, remember to go pick them up from your local pharmacy, or call the specialty pharmacy to request shipment.  Always take your prescription medications exactly as prescribed unless instructed otherwise.  Note that antirheumatic drugs and steroids are immunosuppressive which means they increase your risk of infections and have multiple potential adverse effects on various organ systems in your body, though most of them are uncommon.  It is important that you are up-to-date on age-appropriate immunizations, particularly shingles and bacterial/viral pneumonia vaccines, which you can request from me or your primary care provider.  Be sure to read the drug package inserts to learn about the potential side effects of your medications before you start taking them.  If you experience any significant drug side effects, stop taking the medication and notify me promptly, and depending on the severity of the side effects, consider going to an urgent care or emergency department for immediate attention.  If there are significant findings on your lab tests and imaging studies that warrant further action, I will notify you with explanations via [employer] or phone call, otherwise you can view them on BTIG and let me know if you have any questions.  Note that BTIG messages are typically read during office hours and may take 1-7 business days before a response depending on the urgency of the situation and how busy my clinic schedule is.  In general, BTIG messaging is for non-urgent matters that do not require immediate attention, so for urgent matters that cannot wait, you are advised to go to an urgent care.  Lastly, you are granted  MyChart access to my documentation of your visit and are encouraged to read my note which details my assessment and plan for your condition.

## 2023-03-21 NOTE — PROGRESS NOTES
1359 Dr Winkler in to see patient, discharge instructions given to pt and discussed. pt with no questions at this time.

## 2023-03-28 ENCOUNTER — TELEPHONE (OUTPATIENT)
Dept: PHYSICAL MEDICINE AND REHAB | Facility: MEDICAL CENTER | Age: 63
End: 2023-03-28
Payer: COMMERCIAL

## 2023-03-28 NOTE — TELEPHONE ENCOUNTER
Called for post-sp check-up. Pt reported the following regarding the procedure site: Caudal epidural steroid inj    Change in pain?: some improvement    Concerns?: none    Confirmed [facility] appt?: yes, 4/19

## 2023-04-11 ENCOUNTER — OFFICE VISIT (OUTPATIENT)
Dept: MEDICAL GROUP | Facility: PHYSICIAN GROUP | Age: 63
End: 2023-04-11
Payer: COMMERCIAL

## 2023-04-11 ENCOUNTER — TELEPHONE (OUTPATIENT)
Dept: MEDICAL GROUP | Facility: PHYSICIAN GROUP | Age: 63
End: 2023-04-11

## 2023-04-11 VITALS
BODY MASS INDEX: 28.78 KG/M2 | HEIGHT: 62 IN | HEART RATE: 93 BPM | OXYGEN SATURATION: 94 % | SYSTOLIC BLOOD PRESSURE: 122 MMHG | WEIGHT: 156.4 LBS | DIASTOLIC BLOOD PRESSURE: 74 MMHG | TEMPERATURE: 97 F | RESPIRATION RATE: 16 BRPM

## 2023-04-11 DIAGNOSIS — E03.9 HYPOTHYROIDISM, UNSPECIFIED TYPE: ICD-10-CM

## 2023-04-11 DIAGNOSIS — M54.50 CHRONIC LOW BACK PAIN, UNSPECIFIED BACK PAIN LATERALITY, UNSPECIFIED WHETHER SCIATICA PRESENT: ICD-10-CM

## 2023-04-11 DIAGNOSIS — G89.29 CHRONIC LOW BACK PAIN, UNSPECIFIED BACK PAIN LATERALITY, UNSPECIFIED WHETHER SCIATICA PRESENT: ICD-10-CM

## 2023-04-11 DIAGNOSIS — R06.02 SOB (SHORTNESS OF BREATH): ICD-10-CM

## 2023-04-11 PROCEDURE — 99214 OFFICE O/P EST MOD 30 MIN: CPT | Performed by: NURSE PRACTITIONER

## 2023-04-11 RX ORDER — LEVOTHYROXINE SODIUM 112 UG/1
112 TABLET ORAL
Qty: 90 TABLET | Refills: 3 | Status: SHIPPED | OUTPATIENT
Start: 2023-04-11

## 2023-04-11 RX ORDER — ALBUTEROL SULFATE 90 UG/1
2 AEROSOL, METERED RESPIRATORY (INHALATION) EVERY 4 HOURS PRN
Qty: 1 EACH | Refills: 11 | Status: SHIPPED | OUTPATIENT
Start: 2023-04-11 | End: 2024-03-05 | Stop reason: SDUPTHER

## 2023-04-11 RX ORDER — LEFLUNOMIDE 20 MG/1
20 TABLET ORAL DAILY
COMMUNITY
Start: 2023-03-19 | End: 2023-04-11

## 2023-04-11 ASSESSMENT — FIBROSIS 4 INDEX: FIB4 SCORE: 0.8

## 2023-04-11 NOTE — ASSESSMENT & PLAN NOTE
Patient has a long-term history of smoking   reports some shortness of breath when she lays down at night has used her 's albuterol inhaler in the past and feels it was helpful   will send to [facility]

## 2023-04-11 NOTE — ASSESSMENT & PLAN NOTE
Sees Dr Winkler  Some relief but still having right sided sciatica  Cannot take gabapentin during the day as it makes her too sleepy  advised her to discuss side effects with Dr. Winkler at her follow-up Lyrica may be a good choice  Has f/up in near future w/her

## 2023-04-11 NOTE — PROGRESS NOTES
"Subjective:     CC:   Chief Complaint   Patient presents with    [facility] Care              HPI:   Diane presents today with    Chronic low back pain  Sees Dr Winkler  Some relief but still having right sided sciatica  Cannot take gabapentin during the day as it makes her too sleepy  advised her to discuss side effects with Dr. Winkler at her follow-up Lyrica may be a good choice  Has f/up in near future w/her     SOB (shortness of breath)  Patient has a long-term history of smoking   reports some shortness of breath when she lays down at night has used her 's albuterol inhaler in the past and feels it was helpful   will send to [facility]    Hypothyroidism  Most recent TSH December 2022 was within normal limits patient continues to take 112 mcg levothyroxine per day              ROS per HPI    Objective:     Exam:  /74   Pulse 93   Temp 36.1 °C (97 °F) (Temporal)   Resp 16   Ht 1.575 m (5' 2\")   Wt 70.9 kg (156 lb 6.4 oz)   SpO2 94%   BMI 28.61 kg/m²  Body mass index is 28.61 kg/m².    Physical Exam:  Constitutional: Well-developed and well-nourished female Not diaphoretic. No distress.   Skin: warm, dry, intact, no evidence of rash or concerning lesions  Head: Atraumatic without lesions.  Eyes: Conjunctivae are normal. Pupils are equal, round. No scleral icterus.   Ears:  External ears unremarkable.   Neck: Supple, trachea midline. No thyromegaly present. No cervical or supraclavicular lymphadenopathy.  Cardiovascular: Regular rate and rhythm without murmur.   Pulmonary: Clear to ausculation. Normal effort. No rales, ronchi, or wheezing.  Extremities: No cyanosis, clubbing, erythema, nor edema.   Neurological: Alert and oriented x 3.   Psychiatric:  Behavior, mood, and affect are appropriate.        Assessment & Plan:     62 y.o. female with the following -     1. Chronic low back pain, unspecified back pain laterality, unspecified whether sciatica present    2. Hypothyroidism, unspecified type  - " levothyroxine (SYNTHROID) 112 MCG Tab; Take 1 Tablet by mouth every morning on an empty stomach. Take 1 tablet by mouth every morning on an empty stomach  Dispense: 90 Tablet; Refill: 3    3. SOB (shortness of breath)  - albuterol 108 (90 Base) MCG/ACT Aero Soln inhalation aerosol; Inhale 2 Puffs every four hours as needed for Shortness of Breath.  Dispense: 1 Each; Refill: 11       Patient has labs to do for rheumatology in July we will order TSH lipid and vitamin D after she has an appointment and will review at the end of December when due    Return in about 8 months (around 12/11/2023) for lab results. Or sooner prn    Please note that this dictation was created using voice recognition software. I have made every reasonable attempt to correct obvious errors, but I expect that there are errors of grammar and possibly content that I did not discover before finalizing the note.

## 2023-04-11 NOTE — ASSESSMENT & PLAN NOTE
Most recent TSH December 2022 was within normal limits patient continues to take 112 mcg levothyroxine per day

## 2023-04-19 ENCOUNTER — OFFICE VISIT (OUTPATIENT)
Dept: PHYSICAL MEDICINE AND REHAB | Facility: MEDICAL CENTER | Age: 63
End: 2023-04-19
Payer: COMMERCIAL

## 2023-04-19 VITALS
RESPIRATION RATE: 16 BRPM | HEIGHT: 62 IN | SYSTOLIC BLOOD PRESSURE: 118 MMHG | DIASTOLIC BLOOD PRESSURE: 78 MMHG | HEART RATE: 89 BPM | OXYGEN SATURATION: 94 % | WEIGHT: 157.63 LBS | BODY MASS INDEX: 29.01 KG/M2 | TEMPERATURE: 97.2 F

## 2023-04-19 DIAGNOSIS — M54.41 CHRONIC RIGHT-SIDED LOW BACK PAIN WITH RIGHT-SIDED SCIATICA: Primary | ICD-10-CM

## 2023-04-19 DIAGNOSIS — M54.16 LUMBAR RADICULOPATHY: ICD-10-CM

## 2023-04-19 DIAGNOSIS — G89.29 CHRONIC RIGHT-SIDED LOW BACK PAIN WITH RIGHT-SIDED SCIATICA: Primary | ICD-10-CM

## 2023-04-19 DIAGNOSIS — R20.0 NUMBNESS AND TINGLING OF RIGHT LEG: ICD-10-CM

## 2023-04-19 DIAGNOSIS — R20.2 NUMBNESS AND TINGLING OF RIGHT LEG: ICD-10-CM

## 2023-04-19 PROCEDURE — 99214 OFFICE O/P EST MOD 30 MIN: CPT | Performed by: STUDENT IN AN ORGANIZED HEALTH CARE EDUCATION/TRAINING PROGRAM

## 2023-04-19 ASSESSMENT — FIBROSIS 4 INDEX: FIB4 SCORE: 0.8

## 2023-04-19 NOTE — PROGRESS NOTES
Follow-up patient Note    Interventional Pain and Spine  Physiatry (Physical Medicine and Rehabilitation)     Patient Name: Diane Aguilar  : 1960  Date of service: 2023    Chief Complaint:   Chief Complaint   Patient presents with    Follow-Up       HISTORY  Please see new patient note by Dr. Winkler for more details.     HPI  Today's visit   Diane Aguilar ( 1960) is a female with The primary encounter diagnosis was Chronic right-sided low back pain with right-sided sciatica. Diagnoses of Lumbar radiculopathy and Numbness and tingling of right leg were also pertinent to this visit.    Today Diane presents today after 3/21/23 caudal epidural steroid injection with significant 90% improvement in pain. Notes constant numbness in her right 3rd and 4th toes and dorsum of her right feet that can be painful. Pain typically doesn't limit her in desired activities or ADLs. Her pain is now manageable. Takes gabapentin 300mg QHS when she remembers which can help with pain. Unable to tolerate it during the day due to SE drowsiness. Notes some occasional discomfort at her right glute that is manageable.     Pain severity 2/10 currently    Procedure history:  - 3/21/23 caudal epidural steroid injection - 90% improvement in pain    Medical records reviewed by me at today's visit:  PCP note 23 indicating SE sleepiness with gabapentin, possible consideration of lyrica      ROS:   Red Flags ROS:   Fever, Chills, Sweats: Denies  Involuntary Weight Loss: Denies  Bladder Incontinence: Denies  Bowel Incontinence: denies  Saddle Anesthesia: Denies    All other systems reviewed and negative.     PMHx:   Past Medical History:   Diagnosis Date    Seropositive rheumatoid arthritis (HCC) 3/22/2022    Thyroid disease     Tobacco dependence due to cigarettes        PSHx:   Past Surgical History:   Procedure Laterality Date    NC INJ LUMBAR/SACRAL,W/ IMAGING N/A 3/21/2023    Procedure: Caudal epidural steroid injection  with catheter. PATIENT NEEDS TO HOLD NSAIDS FOR 5 DAYS PRIOR TO INJECTION;  Surgeon: Shantelle Winkler M.D.;  [facility];  Service: Pain Management       Family Hx:   Family History   Problem Relation Age of Onset    Other Father         ALS    Lung Disease Paternal Grandmother        Social Hx:  Social History     Socioeconomic History    Marital status:      Spouse name: Not on file    Number of children: Not on file    Years of education: Not on file    Highest education level: Not on file   Occupational History    Not on file   Tobacco Use    Smoking status: Every Day     Packs/day: 1.00     Years: 23.00     Pack years: 23.00     Types: Cigarettes    Smokeless tobacco: Never   Vaping Use    Vaping Use: Never used   Substance and Sexual Activity    Alcohol use: Yes     Comment: occ    Drug use: Never    Sexual activity: Yes     Partners: Male     Birth control/protection: Post-Menopausal   Other Topics Concern    Not on file   Social History Narrative    Not on file     Social Determinants of Health     Financial Resource Strain: Not on file   Food Insecurity: Not on file   Transportation Needs: Not on file   Physical Activity: Not on file   Stress: Not on file   Social Connections: Not on file   Intimate Partner Violence: Not on file   Housing Stability: Not on file       Allergies:  Allergies   Allergen Reactions    Codeine     Pcn [Penicillins]        Medications: reviewed on epic.   Outpatient Medications Marked as Taking for the 4/19/23 encounter (Office Visit) with Shantelle Winkler M.D.   Medication Sig Dispense Refill    levothyroxine (SYNTHROID) 112 MCG Tab Take 1 Tablet by mouth every morning on an empty stomach. Take 1 tablet by mouth every morning on an empty stomach 90 Tablet 3    albuterol 108 (90 Base) MCG/ACT Aero Soln inhalation aerosol Inhale 2 Puffs every four hours as needed for Shortness of Breath. 1 Each 11    gabapentin (NEURONTIN) 300 MG Cap Take 1 Capsule  "by mouth at bedtime. 30 Capsule 2    Etanercept (ENBREL SURECLICK) 50 MG/ML Solution Auto-injector Inject 50 mg under the skin every 7 days. 4 Each 5    estradiol-norethindrone (ACTIVELLA) 1-0.5 MG per tablet Take 1 Tablet by mouth every day. 30 Tablet 1    cetirizine (ZYRTEC) 10 MG Tab Take 4 tablets at night for 1 to 2 weeks then 1 tablet nightly for itching 30 Tablet 2    VITAMIN D PO Take  by mouth.      Calcium Carbonate (CALCIUM 500 PO) Take  by mouth.          Current Outpatient Medications on File Prior to Visit   Medication Sig Dispense Refill    levothyroxine (SYNTHROID) 112 MCG Tab Take 1 Tablet by mouth every morning on an empty stomach. Take 1 tablet by mouth every morning on an empty stomach 90 Tablet 3    albuterol 108 (90 Base) MCG/ACT Aero Soln inhalation aerosol Inhale 2 Puffs every four hours as needed for Shortness of Breath. 1 Each 11    gabapentin (NEURONTIN) 300 MG Cap Take 1 Capsule by mouth at bedtime. 30 Capsule 2    Etanercept (ENBREL SURECLICK) 50 MG/ML Solution Auto-injector Inject 50 mg under the skin every 7 days. 4 Each 5    estradiol-norethindrone (ACTIVELLA) 1-0.5 MG per tablet Take 1 Tablet by mouth every day. 30 Tablet 1    cetirizine (ZYRTEC) 10 MG Tab Take 4 tablets at night for 1 to 2 weeks then 1 tablet nightly for itching 30 Tablet 2    VITAMIN D PO Take  by mouth.      Calcium Carbonate (CALCIUM 500 PO) Take  by mouth.       No current facility-administered medications on file prior to visit.         EXAMINATION     Physical Exam:   /78 (BP Location: Right arm, Patient Position: Sitting, BP Cuff Size: Adult)   Pulse 89   Temp 36.2 °C (97.2 °F) (Temporal)   Resp 16   Ht 1.575 m (5' 2\")   Wt 71.5 kg (157 lb 10.1 oz)   SpO2 94%     Constitutional:   Body Habitus: Body mass index is 28.83 kg/m².  Cooperation: Fully cooperates with exam  Appearance: Well-groomed, well-nourished.    Eyes: No scleral icterus to suggest severe liver disease, no proptosis to suggest " severe hyperthyroidism    ENT -no obvious auditory deficits, no noticeable facial droop     Skin -no rashes or lesions noted     Respiratory-  breathing comfortably on room air, no audible wheezing    Cardiovascular-distal extremities warm and well perfused.  No lower extremity edema is noted.     Gastrointestinal - no obvious abdominal masses, non-distended    Psychiatric- alert and oriented ×3. Normal affect.       Gait -stable gait, antalgic favoring right leg.  No use of ambulatory device.      Musculoskeletal and Neuro -      Thoracic/Lumbar Spine/Sacral Spine/Hips   Inspection: No evidence of atrophy in bilateral lower extremities throughout      Palpation:   Tenderness to palpation over the  right posterolateral glute . No tenderness to palpation elsewhere in the low back/hips including midline of lumbosacral spine, paraspinal muscles bilaterally, lumbar facets bilaterally spanning approximately L1-L5 levels, sacroiliac joints bilaterally, PSIS bilaterally, and greater trochanters bilaterally.     Lumbar spine /hip provocative exam maneuvers  Straight leg raise negative bilaterally  FADIR test negative bilaterally     SI joint tests  JAMEL test negative bilaterally  Thigh thrust test negative bilaterally     Key points for the international standards for neurological classification of spinal cord injury (ISNCSCI) to light touch.   Dermatome R L   L2 2 2   L3 2 2   L4 2 2   L5 1*in calf 2   S1 1 2   S2 2 2         Motor Exam Lower Extremities  ? Myotome R L   Hip flexion L2 5 5   Knee extension L3 5 5   Ankle dorsiflexion L4 5 5   Toe extension L5 5 5   Ankle plantarflexion S1 5 5      Previous exam     There is full active range of motion with lumbar extension     Facet loading maneuver negative bilaterally    Heel walking and toe walking intact.    Reflexes  ?   R L   Patella   2+ 2+   Achilles    2+ 2+      Clonus of the ankle negative bilaterally            MEDICAL DECISION MAKING    Medical records  review: see under HPI section.     DATA    Labs: No new labs available for review since last visit.   Lab Results   Component Value Date/Time    SODIUM 139 12/14/2022 11:39 AM    POTASSIUM 4.2 12/14/2022 11:39 AM    CHLORIDE 105 12/14/2022 11:39 AM    CO2 25 12/14/2022 11:39 AM    ANION 9.0 12/14/2022 11:39 AM    GLUCOSE 84 12/14/2022 11:39 AM    BUN 16 12/14/2022 11:39 AM    CREATININE 0.92 12/14/2022 11:39 AM    CALCIUM 9.1 12/14/2022 11:39 AM    ASTSGOT 14 12/14/2022 11:39 AM    ALTSGPT 19 12/14/2022 11:39 AM    TBILIRUBIN 0.6 12/14/2022 11:39 AM    ALBUMIN 4.4 12/14/2022 11:39 AM    TOTPROTEIN 7.1 12/14/2022 11:39 AM    GLOBULIN 2.7 12/14/2022 11:39 AM    AGRATIO 1.6 12/14/2022 11:39 AM       No results found for: PROTHROMBTM, INR     Lab Results   Component Value Date/Time    WBC 9.1 12/14/2022 11:40 AM    RBC 4.72 12/14/2022 11:40 AM    HEMOGLOBIN 15.4 12/14/2022 11:40 AM    HEMATOCRIT 46.6 12/14/2022 11:40 AM    MCV 98.7 (H) 12/14/2022 11:40 AM    MCH 32.6 12/14/2022 11:40 AM    MCHC 33.0 (L) 12/14/2022 11:40 AM    MPV 10.9 12/14/2022 11:40 AM    NEUTSPOLYS 68.30 12/14/2022 11:40 AM    LYMPHOCYTES 22.20 12/14/2022 11:40 AM    MONOCYTES 7.70 12/14/2022 11:40 AM    EOSINOPHILS 0.80 12/14/2022 11:40 AM    BASOPHILS 0.70 12/14/2022 11:40 AM        No results found for: HBA1C     Imaging:   I personally reviewed following images, these are my reads  MRI lumbar spine 3/8/2023  Transitional anatomy.  Mild bilateral neuroforaminal stenosis at L4-5 with mild to moderate central canal stenosis at this level.  Mild central canal stenosis at L2-3 and L3-4 with mild bilateral neuroforaminal stenosis at L2-3 and left L3-4.  Facet arthropathy most notable at bilateral L2-3, L3-4 and L4-5.See formal radiology report for further details.      IMAGING radiology reads. I reviewed the following radiology reads   MRI lumbar spine 3/8/2023                               Results for orders placed during the hospital encounter of  23    DX-LUMBAR SPINE-2 OR 3 VIEWS    Impression  1.  There is a mild scoliosis of the lumbar spine with no significant spondylolisthesis.  2.  There is mild degenerative disc disease and arthropathy in the lower lumbar spine.  3.  There is transitional anatomy with partial sacralization of the lowest lumbar vertebral body.                         Diagnosis  Visit Diagnoses     ICD-10-CM   1. Chronic right-sided low back pain with right-sided sciatica  M54.41    G89.29   2. Lumbar radiculopathy  M54.16   3. Numbness and tingling of right leg  R20.0    R20.2           ASSESSMENT AND PLAN:  Diane Aguilar (: 1960) is a female with      Diane was seen today for follow-up.    Diagnoses and all orders for this visit:    Chronic right-sided low back pain with right-sided sciatica  -     Referral to Physical Therapy    Lumbar radiculopathy  -     Referral to Physical Therapy    Numbness and tingling of right leg        PLAN  Physical Therapy: referral to PT today. Discussed that PT will likely be of significant long-term benefit especially to prevent the recurrence of her pain     Diagnostic workup: no new imaging needed at this time    Medications:   - Discussed possible trial of lyrica instead of gabapentin as suggested by her PCP. Discussed that the SE of lyrica may be similar to gabapentin, but we would only know after trialing it. At this time the pt feels her pain is mostly tolerable and she would like to continue with gabapentin 300mg QHS which I think is reasonable.  Continue gabapentin as previously prescribed - 300mg QHS PRN.   -Continue RA meds as per rheumatology  -Patient unfortunately had no relief with Celebrex, oral steroids, or Flexeril     Interventions:   -Caudal epidural steroid injection with catheter PRN given significant improvement with this procedure in the past    Follow-up: 3 months to assess progress with PT    Orders Placed This Encounter    Referral to Physical Therapy        Shantelle Winkler MD  Interventional Pain and Spine  Physical Medicine and Rehabilitation  [facility]      The above note documents my personal evaluation of this patient. In addition, I have reviewed and confirmed with the patient and MA the supportive information documented in today's Patient Health Questionnaire and Office Note.     Please note that this dictation was created using voice recognition software. I have made every reasonable attempt to correct obvious errors, but I expect that there are errors of grammar and possibly content that I did not discover before finalizing the note.

## 2023-05-10 ENCOUNTER — TELEPHONE (OUTPATIENT)
Dept: RHEUMATOLOGY | Facility: MEDICAL CENTER | Age: 63
End: 2023-05-10
Payer: COMMERCIAL

## 2023-05-10 DIAGNOSIS — M05.9 SEROPOSITIVE RHEUMATOID ARTHRITIS (HCC): ICD-10-CM

## 2023-05-10 RX ORDER — LEFLUNOMIDE 20 MG/1
20 TABLET ORAL DAILY
Qty: 90 TABLET | Refills: 3 | Status: SHIPPED | OUTPATIENT
Start: 2023-05-10

## 2023-05-10 NOTE — TELEPHONE ENCOUNTER
Patient called office regarding flare up, she states that once she had taken 4 doses of her Enbrel, she stopped her Leflunomide and is now unable to walk. Both feet and right hand are swollen   She is requesting a new rx for Leflunomide, she has also scheduled a follow up visit 5/16  Please advise,

## 2023-05-11 NOTE — TELEPHONE ENCOUNTER
I have sent her a new prescription for leflunomide. In the meantime, okay to manage with over-the-counter Advil or Aleve and Tylenol Arthritis as needed until her follow-up visit.    Josr Mayer M.D.

## 2023-05-16 ENCOUNTER — OFFICE VISIT (OUTPATIENT)
Dept: RHEUMATOLOGY | Facility: MEDICAL CENTER | Age: 63
End: 2023-05-16
Attending: STUDENT IN AN ORGANIZED HEALTH CARE EDUCATION/TRAINING PROGRAM
Payer: COMMERCIAL

## 2023-05-16 VITALS
OXYGEN SATURATION: 93 % | TEMPERATURE: 97.4 F | HEART RATE: 93 BPM | SYSTOLIC BLOOD PRESSURE: 112 MMHG | WEIGHT: 150 LBS | HEIGHT: 62 IN | DIASTOLIC BLOOD PRESSURE: 64 MMHG | BODY MASS INDEX: 27.6 KG/M2

## 2023-05-16 DIAGNOSIS — D84.9 IMMUNOSUPPRESSED STATUS (HCC): ICD-10-CM

## 2023-05-16 DIAGNOSIS — M05.9 SEROPOSITIVE RHEUMATOID ARTHRITIS (HCC): ICD-10-CM

## 2023-05-16 DIAGNOSIS — M15.9 PRIMARY OSTEOARTHRITIS INVOLVING MULTIPLE JOINTS: ICD-10-CM

## 2023-05-16 PROCEDURE — 3074F SYST BP LT 130 MM HG: CPT | Performed by: STUDENT IN AN ORGANIZED HEALTH CARE EDUCATION/TRAINING PROGRAM

## 2023-05-16 PROCEDURE — 99212 OFFICE O/P EST SF 10 MIN: CPT | Performed by: STUDENT IN AN ORGANIZED HEALTH CARE EDUCATION/TRAINING PROGRAM

## 2023-05-16 PROCEDURE — 3078F DIAST BP <80 MM HG: CPT | Performed by: STUDENT IN AN ORGANIZED HEALTH CARE EDUCATION/TRAINING PROGRAM

## 2023-05-16 PROCEDURE — 99214 OFFICE O/P EST MOD 30 MIN: CPT | Performed by: STUDENT IN AN ORGANIZED HEALTH CARE EDUCATION/TRAINING PROGRAM

## 2023-05-16 ASSESSMENT — FIBROSIS 4 INDEX: FIB4 SCORE: 0.8

## 2023-05-16 NOTE — PROGRESS NOTES
[facility] RHEUMATOLOGY  [address] [place] [address], [address], [place] [address]  Phone: [phone] ? Fax: [phone]    RHEUMATOLOGY FOLLOW-UP VISIT NOTE      DATE OF SERVICE: 05/16/2023         Subjective     PRIMARY CARE PRACTITIONER:  TOM Pretty  [address] [facility] [address] [place] [address]    PATIENT IDENTIFICATION:  Diane Aguilar  [address] [place] [id number]    YOB: 1960    MEDICAL RECORD NUMBER: [id number]          CHIEF COMPLAINT:   Chief Complaint   Patient presents with    Follow-Up     Seropositive rheumatoid arthritis (HCC)       RHEUMATOLOGIC HISTORY:  Diane Aguilar is a 62 y.o. female with pertinent history notable for seropositive rheumatoid arthritis diagnosed in 3/2022, osteoarthritis of multiple joints (hands and feet), and Hashimoto's thyroiditis with hypothyroidism. She initially presented on 3/22/2022, reported onset of polyarthralgia in 7/2021 preceded by hypersensitivity rash from weeding at her yard. Since then she had been treated with several one-week courses of steroids and was on prednisone 2.5 mg daily when evaluated. With each course of steroid she experienced improvement but flared soon after completing the treatment. In addition, she has been managing with meloxicam 7.5 mg daily which she had been taken for a long time. She reported ongoing pain/swelling in her hands, wrists, and feet, as well as pain in her lower back and hips, associated with morning stiffness lasting all day and improving with activity.     Pertinent treatment history: Prednisone 30>20 mg tapers during flares (on/off since 3/2022, effective), methotrexate 15-20 mg weekly (3/2022-11/2022, ineffective), leflunomide 20 mg daily (11/2022-4/2023, ineffective), Enbrel 50 mg SC weekly (1/2023-present, very effective).     Pertinent laboratory results: Positive RF 80, anti-CCP 81, and JORGE 1:320 homogenous/cytoplasmic patterns with negative reflex panel (in 3/2022); positive anti-TPO 51.2  and anti-.8 (in 7/2021); negative/normal HLA-B27 (in 10/2022), vitamin D 49<26 (in 12/2022), QTB, HBV and HCV (in 1/2023), ESR, CRP, eGFR, creatinine, LFTs, and unremarkable CBC (in 12/2022).     Pertinent XR imaging as of 3/2022: Hands with typical osteoarthritis of bilateral 1st carpometacarpal joint and distal interphalangeal joint. Feet with postoperative changes of the right 5th proximal phalanx in the left 1st metatarsal head, osteoarthritis of both 1st metatarsophalangeal joint, and right accessory navicular, anatomic variant.    INTERVAL HISTORY:  Recently flared up with severe joint pain/swelling in her hands, wrists, ankles, and jaw after stopping leflunomide after 4 doses of Enbrel as previously instructed.  Has not received the recently re-prescribed leflunomide due to insurance issues but it has not been approved, so we will be going to pick it up today.  Has a large quantity of prednisone 2.5 mg tablets but did not want to take any until she received further instruction from the visit today.    REVIEW OF SYSTEMS:  Except as noted in the history above, relevant review of systems with emphasis on autoimmune rheumatic diseases was otherwise negative.      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    Swelling of both hands 03/08/2022    Cellulitis of right upper extremity 01/26/2022    Psoriasiform eruption 12/07/2021    Hashimoto's thyroiditis 08/02/2021    Vitamin D deficiency 08/02/2021    Postmenopausal 08/02/2021    Tobacco dependence due to cigarettes 08/02/2021       PAST MEDICAL  HISTORY:  Past Medical History:   Diagnosis Date    Seropositive rheumatoid arthritis (HCC) 3/22/2022    Thyroid disease     Tobacco dependence due to cigarettes        PAST SURGICAL HISTORY:  Past Surgical History:   Procedure Laterality Date    NC INJ LUMBAR/SACRAL,W/ IMAGING N/A 3/21/2023    Procedure: Caudal epidural steroid injection with catheter. PATIENT NEEDS TO HOLD NSAIDS FOR 5 DAYS PRIOR TO INJECTION;  Surgeon: Shantelle Winkler M.D.;  [facility];  Service: Pain Management       MEDICATIONS:  Current Outpatient Medications   Medication Sig    leflunomide (ARAVA) 20 MG Tab Take 1 Tablet by mouth every day.    levothyroxine (SYNTHROID) 112 MCG Tab Take 1 Tablet by mouth every morning on an empty stomach. Take 1 tablet by mouth every morning on an empty stomach    albuterol 108 (90 Base) MCG/ACT Aero Soln inhalation aerosol Inhale 2 Puffs every four hours as needed for Shortness of Breath.    gabapentin (NEURONTIN) 300 MG Cap Take 1 Capsule by mouth at bedtime.    Etanercept (ENBREL SURECLICK) 50 MG/ML Solution Auto-injector Inject 50 mg under the skin every 7 days.    estradiol-norethindrone (ACTIVELLA) 1-0.5 MG per tablet Take 1 Tablet by mouth every day.    cetirizine (ZYRTEC) 10 MG Tab Take 4 tablets at night for 1 to 2 weeks then 1 tablet nightly for itching    VITAMIN D PO Take  by mouth.    Calcium Carbonate (CALCIUM 500 PO) Take  by mouth.       ALLERGIES:   Allergies   Allergen Reactions    Codeine     Pcn [Penicillins]        IMMUNIZATIONS:  Immunization History   Administered Date(s) Administered    Influenza Seasonal Injectable - Historical Data 12/18/2012    Influenza Vac Subunit Quad Inj (Pf) 10/15/2018    Influenza Vaccine Quad Inj (Pf) 10/07/2017, 10/18/2019    Influenza Vaccine Quad Recombinant 10/06/2020, 11/18/2021    MODERNA SARS-COV-2 VACCINE (12+) 06/04/2021, 07/02/2021    Zoster Vaccine Live (ZVL) (Zostavax) - HISTORICAL DATA 09/20/2016       SOCIAL HISTORY:  "  Social History     Socioeconomic History    Marital status:    Tobacco Use    Smoking status: Every Day     Packs/day: 1.00     Years: 23.00     Pack years: 23.00     Types: Cigarettes    Smokeless tobacco: Never   Vaping Use    Vaping Use: Never used   Substance and Sexual Activity    Alcohol use: Yes     Comment: occ    Drug use: Never    Sexual activity: Yes     Partners: Male     Birth control/protection: Post-Menopausal       FAMILY HISTORY:  Family History   Problem Relation Age of Onset    Other Father         ALS    Lung Disease Paternal Grandmother             Objective     Vital Signs: /64 (BP Location: Left arm, Patient Position: Sitting, BP Cuff Size: Adult)   Pulse 93   Temp 36.3 °C (97.4 °F) (Temporal)   Ht 1.575 m (5' 2\")   Wt 68 kg (150 lb)   SpO2 93% Body mass index is 27.44 kg/m².    General: Appears well and comfortable  Eyes: No scleral or conjunctival lesions  ENT: No apparent oral or nasal lesions  Head/Neck: No apparent scalp or neck lesions  Cardiovascular: Regular rate and rhythm  Respiratory: Breathing quiet and unlabored  Gastrointestinal: No organomegaly or abdominal masses  Integumentary: No significant cutaneous lesions or discolorations  Musculoskeletal: Moderate tenderness/warmth of hands (with swelling on 2nd-4th MCP joints), wrists (with synovial cystic swelling on the volar radial aspect), and ankles (around medial malleoli) all consistent with synovitis; overall range of motion of affected joints limited by pain  Neurologic: No focal sensory or motor deficits  Psychiatric: Mood and affect appropriate      LABORATORY RESULTS REVIEWED AND INTERPRETED BY ME:  Lab Results   Component Value Date/Time    SEDRATEWES 6 12/14/2022 11:40 AM    CREACTPROT <0.30 12/14/2022 11:40 AM     Lab Results   Component Value Date/Time    RHEUMFACTN 80 (H) 03/08/2022 02:32 PM    CCPANTIBODY 81 (H) 03/23/2022 09:46 AM    RXKV39RLBZ Negative 10/26/2022 03:55 PM     Lab Results "   Component Value Date/Time    ANTINUCAB Detected (A) 03/23/2022 09:46 AM     Lab Results   Component Value Date/Time    ANTIDNADS 15 03/23/2022 09:46 AM    SMITHAB 3 03/23/2022 09:46 AM    RNPAB 7 03/23/2022 09:46 AM    EVEZQZG88 9 03/23/2022 09:46 AM    SSA60 3 03/23/2022 09:46 AM    SSA52 1 03/23/2022 09:46 AM    ANTISSBSJ 0 03/23/2022 09:46 AM    JO1AB 1 03/23/2022 09:46 AM     Lab Results   Component Value Date/Time    MICROSOMALA 51.2 (H) 07/24/2021 09:09 AM    ANTITHYROGL 286.8 (H) 07/24/2021 09:09 AM    TSHULTRASEN 1.300 12/14/2022 11:39 AM     Lab Results   Component Value Date/Time    ASTSGOT 14 12/14/2022 11:39 AM    ALTSGPT 19 12/14/2022 11:39 AM    ALKPHOSPHAT 66 12/14/2022 11:39 AM    TBILIRUBIN 0.6 12/14/2022 11:39 AM    TOTPROTEIN 7.1 12/14/2022 11:39 AM    ALBUMIN 4.4 12/14/2022 11:39 AM     Lab Results   Component Value Date/Time    SODIUM 139 12/14/2022 11:39 AM    POTASSIUM 4.2 12/14/2022 11:39 AM    CHLORIDE 105 12/14/2022 11:39 AM    CO2 25 12/14/2022 11:39 AM    GLUCOSE 84 12/14/2022 11:39 AM    BUN 16 12/14/2022 11:39 AM    CREATININE 0.92 12/14/2022 11:39 AM    CALCIUM 9.1 12/14/2022 11:39 AM     Lab Results   Component Value Date/Time    WBC 9.1 12/14/2022 11:40 AM    RBC 4.72 12/14/2022 11:40 AM    HEMOGLOBIN 15.4 12/14/2022 11:40 AM    HEMATOCRIT 46.6 12/14/2022 11:40 AM    MCV 98.7 (H) 12/14/2022 11:40 AM    MCH 32.6 12/14/2022 11:40 AM    MCHC 33.0 (L) 12/14/2022 11:40 AM    RDW 51.2 (H) 12/14/2022 11:40 AM    PLATELETCT 244 12/14/2022 11:40 AM    MPV 10.9 12/14/2022 11:40 AM    NEUTS 6.21 12/14/2022 11:40 AM    LYMPHOCYTES 22.20 12/14/2022 11:40 AM    MONOCYTES 7.70 12/14/2022 11:40 AM    EOSINOPHILS 0.80 12/14/2022 11:40 AM    BASOPHILS 0.70 12/14/2022 11:40 AM     Lab Results   Component Value Date/Time    25HYDROXY 49 12/14/2022 11:39 AM     Lab Results   Component Value Date/Time    CHOLSTRLTOT 203 (H) 12/14/2022 11:39 AM     (H) 12/14/2022 11:39 AM    HDL 85 12/14/2022  11:39 AM    TRIGLYCERIDE 72 12/14/2022 11:39 AM       RADIOLOGY RESULTS REVIEWED AND INTERPRETED BY ME:  Results for orders placed in visit on 03/23/22    DX-JOINT SURVEY-FEET SINGLE VIEW    Impression  1.  No evidence for inflammatory arthritis    2.  Postoperative changes of the right 5th proximal phalanx in the left 1st metatarsal head    3.  osteoarthritis of both 1st metatarsophalangeal joint    4.  Right accessory navicular, anatomic variant    Results for orders placed in visit on 03/23/22    DX-JOINT SURVEY-HANDS SINGLE VIEW    Impression  1.  No evidence for erosive arthritis    2.  Typical osteoarthritis of bilateral 1st carpometacarpal joint and distal interphalangeal joint      All relevant laboratory and imaging results reported on this note were reviewed and interpreted by me.         Assessment & Plan     Diane Aguilar is a 62 y.o. female with history as noted above whose presentation merits the following diagnostic and clinical status impressions and recommendations:    1. Seropositive rheumatoid arthritis (HCC)  Clinically active disease with inadequate control on the current regimen on Enbrel monotherapy upon discontinuation of leflunomide, so need optimization of treatment by restarting leflunomide. This raises concern for development of drug neutralizing antibodies which could render Enbrel ineffective, so reasonable to test for this phenomenon along with reassessing the inflammatory markers prior to next visit. Given that DMARDs can take up to 6 or more weeks to exert clinical effect, need to bridge with a course of steroid taper for more immediate symptom relief.  - Miscellaneous Test (anti-etanercept at antibody); Future  - ESR and CRP (previously ordered)  - Continue Enbrel 50 mg SC weekly  - Restart leflunomide 20 mg daily as prescribed  - Prednisone 20 mg daily for 7 days, then decrease by 2.5 mg every 7 days until finished (advised to use the prednisone 2.5 mg tablets she has in hand  and notify me before she runs out so I can send her a refill)    2. Primary osteoarthritis involving multiple joints  Presumably a significant contributor to her overall joint pain burden which can be managed conservatively.  - Voltaren 1% gel and Tylenol Arthritis with/without oral NSAIDs as needed  - Consider intra-articular steroid injection if that becomes necessary    3. Immunosuppressed status  Presently with no reported history, physical exam, or laboratory evidence to suggest significant adverse drug effects or opportunistic infections, but need routine monitoring per guidelines.  - CBC and CMP (previously ordered)  - Need to ascertain age-appropriate vaccines including Shingrix      FOLLOW-UP: Return in about 2 months (around 7/24/2023) for Short.         Thank you for the opportunity to participate in the care of Diane Aguilar.    Josr Mayer MD, MS  Rheumatologist, [facility] Rheumatology ? [facility]   of Clinical Medicine, Department of Internal Medicine  [facility] ? [facility]

## 2023-05-16 NOTE — PATIENT INSTRUCTIONS
AFTER VISIT INSTRUCTIONS    Below are important information to help you navigate your healthcare needs and help us serve you safely and effectively:  If laboratory tests and/or imaging studies were ordered, remember to go get them done as instructed.  If new prescriptions and/or refills were sent, remember to go pick them up from your local pharmacy, or call the specialty pharmacy to request shipment.  Always take your prescription medications exactly as prescribed unless instructed otherwise.  Note that antirheumatic drugs and steroids are immunosuppressive which means they increase your risk of infections and have multiple potential adverse effects on various organ systems in your body, though most of them are uncommon.  It is important that you are up-to-date on age-appropriate immunizations, particularly shingles and bacterial/viral pneumonia vaccines, which you can request from me or your primary care provider.  Be sure to read the drug package inserts to learn about the potential side effects of your medications before you start taking them.  If you experience any significant drug side effects, stop taking the medication and notify me promptly, and depending on the severity of the side effects, consider going to an urgent care or emergency department for immediate attention.  If there are significant findings on your lab tests and imaging studies that warrant further action, I will notify you with explanations via [email or web address] or phone call, otherwise you can view them on CUPR and let me know if you have any questions.  Note that CUPR messages are typically read during office hours and may take 1-7 business days before a response depending on the urgency of the situation and how busy my clinic schedule is.  In general, CUPR messaging is for non-urgent matters that do not require immediate attention, so for urgent matters that cannot wait, you are advised to go to an urgent care.  Lastly, you are granted  MyChart access to my documentation of your visit and are encouraged to read my note which details my assessment and plan for your condition.

## 2023-05-22 ENCOUNTER — PATIENT MESSAGE (OUTPATIENT)
Dept: RHEUMATOLOGY | Facility: MEDICAL CENTER | Age: 63
End: 2023-05-22
Payer: COMMERCIAL

## 2023-05-22 DIAGNOSIS — M05.9 SEROPOSITIVE RHEUMATOID ARTHRITIS (HCC): ICD-10-CM

## 2023-05-26 ENCOUNTER — TELEPHONE (OUTPATIENT)
Dept: RHEUMATOLOGY | Facility: MEDICAL CENTER | Age: 63
End: 2023-05-26
Payer: COMMERCIAL

## 2023-05-26 RX ORDER — PREDNISONE 5 MG/1
TABLET ORAL
Qty: 98 TABLET | Refills: 0 | Status: SHIPPED | OUTPATIENT
Start: 2023-05-26 | End: 2023-07-24

## 2023-05-26 NOTE — TELEPHONE ENCOUNTER
Patient sent [employer] message regarding Prednisone rx, she is currently out and requesting refill to [facility] pharmacy.   Please advise,

## 2023-07-14 ENCOUNTER — HOSPITAL ENCOUNTER (OUTPATIENT)
Dept: LAB | Facility: MEDICAL CENTER | Age: 63
End: 2023-07-14
Attending: STUDENT IN AN ORGANIZED HEALTH CARE EDUCATION/TRAINING PROGRAM
Payer: COMMERCIAL

## 2023-07-14 DIAGNOSIS — M05.9 SEROPOSITIVE RHEUMATOID ARTHRITIS (HCC): ICD-10-CM

## 2023-07-14 DIAGNOSIS — D84.9 IMMUNOSUPPRESSED STATUS (HCC): ICD-10-CM

## 2023-07-14 LAB
ALBUMIN SERPL BCP-MCNC: 4.3 G/DL (ref 3.2–4.9)
ALBUMIN/GLOB SERPL: 1.5 G/DL
ALP SERPL-CCNC: 57 U/L (ref 30–99)
ALT SERPL-CCNC: 13 U/L (ref 2–50)
ANION GAP SERPL CALC-SCNC: 12 MMOL/L (ref 7–16)
AST SERPL-CCNC: 13 U/L (ref 12–45)
BASOPHILS # BLD AUTO: 1 % (ref 0–1.8)
BASOPHILS # BLD: 0.12 K/UL (ref 0–0.12)
BILIRUB SERPL-MCNC: 1 MG/DL (ref 0.1–1.5)
BUN SERPL-MCNC: 12 MG/DL (ref 8–22)
CALCIUM ALBUM COR SERPL-MCNC: 9.3 MG/DL (ref 8.5–10.5)
CALCIUM SERPL-MCNC: 9.5 MG/DL (ref 8.5–10.5)
CHLORIDE SERPL-SCNC: 105 MMOL/L (ref 96–112)
CO2 SERPL-SCNC: 22 MMOL/L (ref 20–33)
CREAT SERPL-MCNC: 0.84 MG/DL (ref 0.5–1.4)
CRP SERPL HS-MCNC: <0.3 MG/DL (ref 0–0.75)
EOSINOPHIL # BLD AUTO: 0.28 K/UL (ref 0–0.51)
EOSINOPHIL NFR BLD: 2.3 % (ref 0–6.9)
ERYTHROCYTE [DISTWIDTH] IN BLOOD BY AUTOMATED COUNT: 55.4 FL (ref 35.9–50)
ERYTHROCYTE [SEDIMENTATION RATE] IN BLOOD BY WESTERGREN METHOD: 4 MM/HOUR (ref 0–25)
GFR SERPLBLD CREATININE-BSD FMLA CKD-EPI: 79 ML/MIN/1.73 M 2
GLOBULIN SER CALC-MCNC: 2.8 G/DL (ref 1.9–3.5)
GLUCOSE SERPL-MCNC: 68 MG/DL (ref 65–99)
HCT VFR BLD AUTO: 51.1 % (ref 37–47)
HGB BLD-MCNC: 16.1 G/DL (ref 12–16)
IMM GRANULOCYTES # BLD AUTO: 0.05 K/UL (ref 0–0.11)
IMM GRANULOCYTES NFR BLD AUTO: 0.4 % (ref 0–0.9)
LYMPHOCYTES # BLD AUTO: 2.74 K/UL (ref 1–4.8)
LYMPHOCYTES NFR BLD: 22.4 % (ref 22–41)
MCH RBC QN AUTO: 31 PG (ref 27–33)
MCHC RBC AUTO-ENTMCNC: 31.5 G/DL (ref 32.2–35.5)
MCV RBC AUTO: 98.5 FL (ref 81.4–97.8)
MONOCYTES # BLD AUTO: 0.87 K/UL (ref 0–0.85)
MONOCYTES NFR BLD AUTO: 7.1 % (ref 0–13.4)
NEUTROPHILS # BLD AUTO: 8.16 K/UL (ref 1.82–7.42)
NEUTROPHILS NFR BLD: 66.8 % (ref 44–72)
NRBC # BLD AUTO: 0 K/UL
NRBC BLD-RTO: 0 /100 WBC (ref 0–0.2)
PLATELET # BLD AUTO: 297 K/UL (ref 164–446)
PMV BLD AUTO: 10.8 FL (ref 9–12.9)
POTASSIUM SERPL-SCNC: 4.3 MMOL/L (ref 3.6–5.5)
PROT SERPL-MCNC: 7.1 G/DL (ref 6–8.2)
RBC # BLD AUTO: 5.19 M/UL (ref 4.2–5.4)
SODIUM SERPL-SCNC: 139 MMOL/L (ref 135–145)
WBC # BLD AUTO: 12.2 K/UL (ref 4.8–10.8)

## 2023-07-14 PROCEDURE — 80299 QUANTITATIVE ASSAY DRUG: CPT

## 2023-07-14 PROCEDURE — 36415 COLL VENOUS BLD VENIPUNCTURE: CPT

## 2023-07-14 PROCEDURE — 82397 CHEMILUMINESCENT ASSAY: CPT

## 2023-07-14 PROCEDURE — 80053 COMPREHEN METABOLIC PANEL: CPT

## 2023-07-14 PROCEDURE — 85025 COMPLETE CBC W/AUTO DIFF WBC: CPT

## 2023-07-14 PROCEDURE — 86140 C-REACTIVE PROTEIN: CPT

## 2023-07-14 PROCEDURE — 85652 RBC SED RATE AUTOMATED: CPT

## 2023-07-24 ENCOUNTER — OFFICE VISIT (OUTPATIENT)
Dept: RHEUMATOLOGY | Facility: MEDICAL CENTER | Age: 63
End: 2023-07-24
Attending: STUDENT IN AN ORGANIZED HEALTH CARE EDUCATION/TRAINING PROGRAM
Payer: COMMERCIAL

## 2023-07-24 VITALS
TEMPERATURE: 98 F | DIASTOLIC BLOOD PRESSURE: 78 MMHG | HEART RATE: 80 BPM | WEIGHT: 152.38 LBS | BODY MASS INDEX: 28.04 KG/M2 | HEIGHT: 62 IN | OXYGEN SATURATION: 94 % | SYSTOLIC BLOOD PRESSURE: 118 MMHG

## 2023-07-24 DIAGNOSIS — D84.9 IMMUNOSUPPRESSED STATUS (HCC): ICD-10-CM

## 2023-07-24 DIAGNOSIS — M05.9 SEROPOSITIVE RHEUMATOID ARTHRITIS (HCC): ICD-10-CM

## 2023-07-24 DIAGNOSIS — M15.9 PRIMARY OSTEOARTHRITIS INVOLVING MULTIPLE JOINTS: ICD-10-CM

## 2023-07-24 PROCEDURE — 99212 OFFICE O/P EST SF 10 MIN: CPT | Performed by: STUDENT IN AN ORGANIZED HEALTH CARE EDUCATION/TRAINING PROGRAM

## 2023-07-24 PROCEDURE — 3078F DIAST BP <80 MM HG: CPT | Performed by: STUDENT IN AN ORGANIZED HEALTH CARE EDUCATION/TRAINING PROGRAM

## 2023-07-24 PROCEDURE — 3074F SYST BP LT 130 MM HG: CPT | Performed by: STUDENT IN AN ORGANIZED HEALTH CARE EDUCATION/TRAINING PROGRAM

## 2023-07-24 PROCEDURE — 99214 OFFICE O/P EST MOD 30 MIN: CPT | Performed by: STUDENT IN AN ORGANIZED HEALTH CARE EDUCATION/TRAINING PROGRAM

## 2023-07-24 ASSESSMENT — RHEUMATOLOGY FOLLOW-UP QUESTIONNAIRE
MARK ALL THE AREAS OF PAIN: 88503287
CHIEF_COMPLAINT: FOLLOW UP
DRY COUGH: Y

## 2023-07-24 ASSESSMENT — FIBROSIS 4 INDEX: FIB4 SCORE: 0.74

## 2023-07-24 NOTE — PATIENT INSTRUCTIONS
AFTER VISIT INSTRUCTIONS    Below are important information to help you navigate your healthcare needs and help us serve you safely and effectively:  If laboratory tests and/or imaging studies were ordered, remember to go get them done as instructed.  If new prescriptions and/or refills were sent, remember to go pick them up from your local pharmacy, or call the specialty pharmacy to request shipment.  Always take your prescription medications exactly as prescribed unless instructed otherwise.  Note that antirheumatic drugs and steroids are immunosuppressive which means they increase your risk of infections and have multiple potential adverse effects on various organ systems in your body, though most of them are uncommon.  It is important that you are up-to-date on age-appropriate immunizations, particularly shingles and bacterial/viral pneumonia vaccines, which you can request from me or your primary care provider.  Be sure to read the drug package inserts to learn about the potential side effects of your medications before you start taking them.  If you experience any significant drug side effects, stop taking the medication and notify me promptly, and depending on the severity of the side effects, consider going to an urgent care or emergency department for immediate attention.  If there are significant findings on your lab tests and imaging studies that warrant further action, I will notify you with explanations via PadMatcherhart or phone call, otherwise you can view them on ZIPDIGS and let me know if you have any questions.  Note that ZIPDIGS messages are typically read during office hours and may take 1-7 business days before a response depending on the urgency of the situation and how busy my clinic schedule is.  In general, ZIPDIGS messaging is for non-urgent matters that do not require immediate attention, so for urgent matters that cannot wait, you are advised to go to an urgent care.  Lastly, you are granted  Pelon access to my documentation of your visit and are encouraged to read my note which details my assessment and plan for your condition.  To learn more about the rheumatic diseases evaluated and treated by rheumatologists, as well as gain access to many helpful resources about these diseases, visit our website at [email or web address].

## 2023-07-24 NOTE — PROGRESS NOTES
[facility] RHEUMATOLOGY  [address] [place] [address], [address], [place] [address]  Phone: [phone] ? Fax: [phone]    RHEUMATOLOGY FOLLOW-UP VISIT NOTE      DATE OF SERVICE: 07/24/2023         Subjective     PRIMARY CARE PRACTITIONER:  TOM Pretty  [address] [facility] [address] [place] [address]    PATIENT IDENTIFICATION:  Diane Aguilar  [address] [place] [id number]    YOB: 1960    MEDICAL RECORD NUMBER: [id number]          CHIEF COMPLAINT:   Chief Complaint   Patient presents with    Follow-Up     Seropositive rheumatoid arthritis (HCC)        RHEUMATOLOGIC HISTORY:  Diane Aguilar is a 62 y.o. female with pertinent history notable for seropositive rheumatoid arthritis diagnosed in 3/2022, osteoarthritis of multiple joints (hands and feet), and Hashimoto's thyroiditis with hypothyroidism. She initially presented on 3/22/2022, reported onset of polyarthralgia in 7/2021 preceded by hypersensitivity rash from weeding at her yard. Since then she had been treated with several one-week courses of steroids and was on prednisone 2.5 mg daily when evaluated. With each course of steroid she experienced improvement but flared soon after completing the treatment. In addition, she has been managing with meloxicam 7.5 mg daily which she had been taken for a long time. She reported ongoing pain/swelling in her hands, wrists, and feet, as well as pain in her lower back and hips, associated with morning stiffness lasting all day and improving with activity.     Pertinent treatment history: Prednisone 30>20 mg tapers during flares (on/off since 3/2022-7/2023, effective), methotrexate 15-20 mg weekly (3/2022-11/2022, ineffective), leflunomide 20 mg daily (11/2022-4/2023, flared on Enbrel alone, so restarted 5/2023-present), Enbrel 50 mg SC weekly (1/2023-present, very effective).     Pertinent laboratory results: Positive RF 80, anti-CCP 81, and JORGE 1:320 homogenous/cytoplasmic patterns with negative  reflex panel (in 3/2022); positive anti-TPO 51.2 and anti-.8 (in 7/2021); negative/normal HLA-B27 (in 10/2022), vitamin D 49<26 (in 12/2022), QTB, HBV and HCV (in 1/2023), ESR, CRP, and CMP (in 7/2023).     Pertinent XR imaging (in 3/2022): Hands with typical osteoarthritis of bilateral 1st carpometacarpal joint and distal interphalangeal joint. Feet with postoperative changes of the right 5th proximal phalanx in the left 1st metatarsal head, osteoarthritis of both 1st metatarsophalangeal joint, and right accessory navicular, anatomic variant.    INTERVAL HISTORY:  [facility] Established Patient History Form    7/24/2023  9:54 AM PDT - Filed by Patient   MAIN REASON FOR VISIT Follow up   INTERVAL HISTORY OF ILLNESS   Date of worsening onset:    Preceding incident/ailment:    Describe/list your symptoms: Doing well with no joint pain   Exacerbating factors:    Alleviating factors:    Helpful medications:    Ineffective medications:    Severity of pain (scale of 1-10):    Personal/emotional stressors:    Mateusz All The Areas Of Pain    REVIEW OF SYMPTOMS    General   Fevers    Chills    Night sweats    Malaise    Fatigue    Unintentional weight loss    Musculoskeletal   Joint pain    Morning stiffness duration    Morning stiffness characteristic    Joint swelling    Joint instability    Tendon pain    Muscle pain    Body aches    Dermatologic   Hair loss with bald spots    Hair shedding    Skin thickening    Skin plaques    Sunlight-induced skin rash    Cold-induced color changes (white, purple, red on rewarming)    Neurologic/Psychiatric   Weakness    Spasms    Tingling    Burning    Numbness    Insomnia    Anxiety    Depression    Head/Eyes   Headaches    Temple pain    Dizziness    Dry eyes    Eye pain    Eye redness    Blurry vision    Vision loss    Ears/Nose   Ear pain    Ringing in ears    Vertigo    Hearing loss    Nasal ulcers    Nosebleeds    Sinus pain    Nasal congestion    Snoring     Mouth/Throat   Oral ulcers    Bleeding gums    Dry mouth    Cavities    Sore throat    Sticking in throat    Difficulty speaking    Neck/Lymphatics   Thyroid pain    Thyroid swelling    Lymph node swelling    Cardiac/Respiratory   Chest pain with breathing    Dry cough Yes   Cough with bloody phlegm    Shortness of breath    Fast heartbeats    Irregular heartbeats    Gastrointestinal   Nausea    Vomiting    Difficulty swallowing    Heartburn    Abdominal pain    Bloody stool    Mucus stool    Genitourinary   Pelvic pain    Genital ulcers    Abnormal discharge    Burning urination    Frothy urine    Blood in urine        REVIEW OF SYSTEMS:  Except as noted in the history above, relevant review of systems with emphasis on autoimmune rheumatic diseases was otherwise negative.      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    Swelling of both hands 03/08/2022    Cellulitis of right upper extremity 01/26/2022    Psoriasiform eruption 12/07/2021    Hashimoto's thyroiditis 08/02/2021    Vitamin D deficiency 08/02/2021    Postmenopausal 08/02/2021    Tobacco dependence due to cigarettes 08/02/2021       PAST MEDICAL HISTORY:  Past Medical History:   Diagnosis Date    Seropositive rheumatoid arthritis (HCC) 3/22/2022    Thyroid disease     Tobacco dependence due to cigarettes        PAST SURGICAL HISTORY:  Past Surgical History:   Procedure Laterality Date    VA INJ LUMBAR/SACRAL,W/ IMAGING N/A 3/21/2023    Procedure: Caudal epidural steroid injection with catheter. PATIENT NEEDS TO HOLD NSAIDS FOR 5 DAYS PRIOR  TO INJECTION;  Surgeon: Shantelle Winkler M.D.;  [facility];  Service: Pain Management       MEDICATIONS:  Current Outpatient Medications   Medication Sig    ENBREL SURECLICK 50 MG/ML Solution Auto-injector INJECT 1 PEN UNDER THE SKIN EVERY 7 DAYS.    leflunomide (ARAVA) 20 MG Tab Take 1 Tablet by mouth every day.    levothyroxine (SYNTHROID) 112 MCG Tab Take 1 Tablet by mouth every morning on an empty stomach. Take 1 tablet by mouth every morning on an empty stomach    albuterol 108 (90 Base) MCG/ACT Aero Soln inhalation aerosol Inhale 2 Puffs every four hours as needed for Shortness of Breath.    gabapentin (NEURONTIN) 300 MG Cap Take 1 Capsule by mouth at bedtime.    estradiol-norethindrone (ACTIVELLA) 1-0.5 MG per tablet Take 1 Tablet by mouth every day.    cetirizine (ZYRTEC) 10 MG Tab Take 4 tablets at night for 1 to 2 weeks then 1 tablet nightly for itching    VITAMIN D PO Take  by mouth.    Calcium Carbonate (CALCIUM 500 PO) Take  by mouth.       ALLERGIES:   Allergies   Allergen Reactions    Codeine     Pcn [Penicillins]        IMMUNIZATIONS:  Immunization History   Administered Date(s) Administered    Influenza Seasonal Injectable - Historical Data 12/18/2012    Influenza Vac Subunit Quad Inj (Pf) 10/15/2018    Influenza Vaccine Quad Inj (Pf) 10/07/2017, 10/18/2019    Influenza Vaccine Quad Recombinant 10/06/2020, 11/18/2021    MODERNA SARS-COV-2 VACCINE (12+) 06/04/2021, 07/02/2021    Zoster Vaccine Live (ZVL) (Zostavax) - HISTORICAL DATA 09/20/2016       SOCIAL HISTORY:   Social History     Socioeconomic History    Marital status:    Tobacco Use    Smoking status: Every Day     Packs/day: 1.00     Years: 23.00     Pack years: 23.00     Types: Cigarettes    Smokeless tobacco: Never   Vaping Use    Vaping Use: Never used   Substance and Sexual Activity    Alcohol use: Yes     Comment: occ    Drug use: Never    Sexual activity: Yes     Partners: Male     Birth  "control/protection: Post-Menopausal       FAMILY HISTORY:  Family History   Problem Relation Age of Onset    Other Father         ALS    Lung Disease Paternal Grandmother             Objective     Vital Signs: /78 (BP Location: Left arm, Patient Position: Sitting, BP Cuff Size: Adult)   Pulse 80   Temp 36.7 °C (98 °F) (Temporal)   Ht 1.575 m (5' 2\")   Wt 69.1 kg (152 lb 6 oz)   SpO2 94% Body mass index is 27.87 kg/m².    General: Appears well and comfortable  Eyes: No scleral or conjunctival lesions  ENT: No apparent oral or nasal lesions  Head/Neck: No apparent scalp or neck lesions  Cardiovascular: Regular rate and rhythm  Respiratory: Breathing quiet and unlabored  Gastrointestinal: No organomegaly or abdominal masses  Integumentary: No significant cutaneous lesions or discolorations  Musculoskeletal: No significant joint tenderness, periarticular soft tissue swelling, warmth, erythema, or overt synovitis; no significant restriction in range of motion of joints examined  Neurologic: No focal sensory or motor deficits  Psychiatric: Mood and affect appropriate      LABORATORY RESULTS REVIEWED AND INTERPRETED BY ME:  Lab Results   Component Value Date/Time    SEDRATEWES 4 07/14/2023 06:39 AM    CREACTPROT <0.30 07/14/2023 06:39 AM     Lab Results   Component Value Date/Time    RHEUMFACTN 80 (H) 03/08/2022 02:32 PM    CCPANTIBODY 81 (H) 03/23/2022 09:46 AM    JXVJ02PHNY Negative 10/26/2022 03:55 PM     Lab Results   Component Value Date/Time    ANTINUCAB Detected (A) 03/23/2022 09:46 AM     Lab Results   Component Value Date/Time    ANTIDNADS 15 03/23/2022 09:46 AM    SMITHAB 3 03/23/2022 09:46 AM    RNPAB 7 03/23/2022 09:46 AM    RZMBEMD75 9 03/23/2022 09:46 AM    SSA60 3 03/23/2022 09:46 AM    SSA52 1 03/23/2022 09:46 AM    ANTISSBSJ 0 03/23/2022 09:46 AM    JO1AB 1 03/23/2022 09:46 AM     Lab Results   Component Value Date/Time    MICROSOMALA 51.2 (H) 07/24/2021 09:09 AM    ANTITHYROGL 286.8 (H) " 07/24/2021 09:09 AM    TSHULTRASEN 1.300 12/14/2022 11:39 AM     Lab Results   Component Value Date/Time    ASTSGOT 13 07/14/2023 06:39 AM    ALTSGPT 13 07/14/2023 06:39 AM    ALKPHOSPHAT 57 07/14/2023 06:39 AM    TBILIRUBIN 1.0 07/14/2023 06:39 AM    TOTPROTEIN 7.1 07/14/2023 06:39 AM    ALBUMIN 4.3 07/14/2023 06:39 AM     Lab Results   Component Value Date/Time    SODIUM 139 07/14/2023 06:39 AM    POTASSIUM 4.3 07/14/2023 06:39 AM    CHLORIDE 105 07/14/2023 06:39 AM    CO2 22 07/14/2023 06:39 AM    GLUCOSE 68 07/14/2023 06:39 AM    BUN 12 07/14/2023 06:39 AM    CREATININE 0.84 07/14/2023 06:39 AM    CALCIUM 9.5 07/14/2023 06:39 AM     Lab Results   Component Value Date/Time    WBC 12.2 (H) 07/14/2023 06:39 AM    RBC 5.19 07/14/2023 06:39 AM    HEMOGLOBIN 16.1 (H) 07/14/2023 06:39 AM    HEMATOCRIT 51.1 (H) 07/14/2023 06:39 AM    MCV 98.5 (H) 07/14/2023 06:39 AM    MCH 31.0 07/14/2023 06:39 AM    MCHC 31.5 (L) 07/14/2023 06:39 AM    RDW 55.4 (H) 07/14/2023 06:39 AM    PLATELETCT 297 07/14/2023 06:39 AM    MPV 10.8 07/14/2023 06:39 AM    NEUTS 8.16 (H) 07/14/2023 06:39 AM    LYMPHOCYTES 22.40 07/14/2023 06:39 AM    MONOCYTES 7.10 07/14/2023 06:39 AM    EOSINOPHILS 2.30 07/14/2023 06:39 AM    BASOPHILS 1.00 07/14/2023 06:39 AM     Lab Results   Component Value Date/Time    25HYDROXY 49 12/14/2022 11:39 AM     Lab Results   Component Value Date/Time    CHOLSTRLTOT 203 (H) 12/14/2022 11:39 AM     (H) 12/14/2022 11:39 AM    HDL 85 12/14/2022 11:39 AM    TRIGLYCERIDE 72 12/14/2022 11:39 AM       RADIOLOGY RESULTS REVIEWED AND INTERPRETED BY ME:  Results for orders placed in visit on 03/23/22    DX-JOINT SURVEY-FEET SINGLE VIEW    Impression  1.  No evidence for inflammatory arthritis    2.  Postoperative changes of the right 5th proximal phalanx in the left 1st metatarsal head    3.  osteoarthritis of both 1st metatarsophalangeal joint    4.  Right accessory navicular, anatomic variant    Results for orders placed  in visit on 03/23/22    DX-JOINT SURVEY-HANDS SINGLE VIEW    Impression  1.  No evidence for erosive arthritis    2.  Typical osteoarthritis of bilateral 1st carpometacarpal joint and distal interphalangeal joint      All relevant laboratory and imaging results reported on this note were reviewed and interpreted by me.         Assessment & Plan     Diane Aguilar is a 62 y.o. female with history as noted above whose presentation merits the following diagnostic and clinical status impressions and recommendations:    1. Seropositive rheumatoid arthritis (HCC)  Clinically and serologically low disease activity well-controlled on the current regimen of Enbrel and leflunomide, so no need for modification of treatment. However, given the potential for discordance between immunologic activity and clinical disease manifestations, need to routinely reassess markers of disease activity to gauge overall trajectory in response to treatment.  - CRP QUANTITIVE (NON-CARDIAC); Future  - Sed Rate; Future  - Anti-etanercept (previously ordered)  - Continue Enbrel 50 mg SC weekly  - Continue leflunomide 20 mg daily as prescribed    2. Primary osteoarthritis involving multiple joints  Presumably an important etiology of her overall joint pain burden which can be managed supportively.  - Voltaren 1% gel and Tylenol Arthritis with or without oral NSAIDs as needed  - Consider intra-articular steroid injection if that becomes necessary    3. Immunosuppressed status  Presently with no reported history, physical exam, or laboratory evidence to suggest significant adverse drug effects or opportunistic infections, but need routine monitoring per guidelines.  - CBC WITH DIFFERENTIAL; Future  - Comp Metabolic Panel; Future  - Previously vaccinated with Zostavax, Moderna COVID-19, and seasonal influenza, but would need to update on zoster and pneumococcal vaccines      The above assessment and plan were discussed with the patient who  acknowledged understanding of the plan.    FOLLOW-UP: Return in about 4 months (around 11/24/2023) for Short.         Thank you for the opportunity to participate in the care of Diane Aguilar.    Josr Mayer MD, MS  Rheumatologist, [facility] Rheumatology ? [facility]   of Clinical Medicine, Department of Internal Medicine  [facility] ? [facility] [employer] [facility] [employer]  Rheumatology website: [email or web address]

## 2023-08-03 LAB — TEST NAME 95000: NORMAL

## 2023-08-28 DIAGNOSIS — M05.9 SEROPOSITIVE RHEUMATOID ARTHRITIS (HCC): ICD-10-CM

## 2023-08-28 RX ORDER — PREDNISONE 5 MG/1
5 TABLET ORAL DAILY
Qty: 126 TABLET | Refills: 0 | Status: SHIPPED | OUTPATIENT
Start: 2023-08-28 | End: 2023-11-22 | Stop reason: DRUGHIGH

## 2023-08-29 NOTE — TELEPHONE ENCOUNTER
Patient called notifying office of RA flare requesting steroids.  Chart reviewed and repeat prednisone taper for 20 mg p.o. daily x7 days then taper by 2.5 mg each week provided.

## 2023-10-08 ENCOUNTER — OFFICE VISIT (OUTPATIENT)
Dept: URGENT CARE | Facility: PHYSICIAN GROUP | Age: 63
End: 2023-10-08
Payer: COMMERCIAL

## 2023-10-08 VITALS
BODY MASS INDEX: 26.91 KG/M2 | TEMPERATURE: 97.3 F | RESPIRATION RATE: 20 BRPM | DIASTOLIC BLOOD PRESSURE: 74 MMHG | WEIGHT: 146.2 LBS | SYSTOLIC BLOOD PRESSURE: 116 MMHG | HEART RATE: 82 BPM | HEIGHT: 62 IN | OXYGEN SATURATION: 93 %

## 2023-10-08 DIAGNOSIS — J20.9 ACUTE WHEEZY BRONCHITIS: ICD-10-CM

## 2023-10-08 DIAGNOSIS — Z79.60 LONG-TERM USE OF IMMUNOSUPPRESSANT MEDICATION: ICD-10-CM

## 2023-10-08 DIAGNOSIS — F17.210 TOBACCO DEPENDENCE DUE TO CIGARETTES: ICD-10-CM

## 2023-10-08 PROCEDURE — 94640 AIRWAY INHALATION TREATMENT: CPT | Performed by: FAMILY MEDICINE

## 2023-10-08 PROCEDURE — 3074F SYST BP LT 130 MM HG: CPT | Performed by: FAMILY MEDICINE

## 2023-10-08 PROCEDURE — 99214 OFFICE O/P EST MOD 30 MIN: CPT | Mod: 25 | Performed by: FAMILY MEDICINE

## 2023-10-08 PROCEDURE — 3078F DIAST BP <80 MM HG: CPT | Performed by: FAMILY MEDICINE

## 2023-10-08 RX ORDER — DOXYCYCLINE HYCLATE 100 MG
100 TABLET ORAL EVERY 12 HOURS
Qty: 14 TABLET | Refills: 0 | Status: SHIPPED | OUTPATIENT
Start: 2023-10-08 | End: 2023-10-15

## 2023-10-08 RX ORDER — PREDNISONE 20 MG/1
40 TABLET ORAL EVERY MORNING
Qty: 8 TABLET | Refills: 0 | Status: SHIPPED | OUTPATIENT
Start: 2023-10-09 | End: 2023-10-13

## 2023-10-08 RX ORDER — DEXAMETHASONE SODIUM PHOSPHATE 10 MG/ML
7 INJECTION INTRAMUSCULAR; INTRAVENOUS ONCE
Status: COMPLETED | OUTPATIENT
Start: 2023-10-08 | End: 2023-10-08

## 2023-10-08 RX ORDER — BENZONATATE 200 MG/1
200 CAPSULE ORAL 3 TIMES DAILY PRN
Qty: 30 CAPSULE | Refills: 0 | Status: SHIPPED | OUTPATIENT
Start: 2023-10-08 | End: 2024-03-05

## 2023-10-08 RX ORDER — BUDESONIDE AND FORMOTEROL FUMARATE DIHYDRATE 80; 4.5 UG/1; UG/1
2 AEROSOL RESPIRATORY (INHALATION) 2 TIMES DAILY
Qty: 1 EACH | Refills: 0 | Status: SHIPPED | OUTPATIENT
Start: 2023-10-08 | End: 2024-01-04 | Stop reason: SDUPTHER

## 2023-10-08 RX ORDER — ALBUTEROL SULFATE 2.5 MG/3ML
2.5 SOLUTION RESPIRATORY (INHALATION) ONCE
Status: COMPLETED | OUTPATIENT
Start: 2023-10-08 | End: 2023-10-08

## 2023-10-08 RX ADMIN — DEXAMETHASONE SODIUM PHOSPHATE 7 MG: 10 INJECTION INTRAMUSCULAR; INTRAVENOUS at 17:04

## 2023-10-08 RX ADMIN — ALBUTEROL SULFATE 2.5 MG: 2.5 SOLUTION RESPIRATORY (INHALATION) at 16:57

## 2023-10-08 ASSESSMENT — FIBROSIS 4 INDEX: FIB4 SCORE: 0.75

## 2023-10-08 ASSESSMENT — ENCOUNTER SYMPTOMS
COUGH: 1
WHEEZING: 1
HEMOPTYSIS: 0
SPUTUM PRODUCTION: 1

## 2023-10-08 NOTE — PROGRESS NOTES
Subjective     Diane Crowell is a 63 y.o. female who presents with Cough (Coughing up mucus, chest congestion-2 weeks )      - This is a very pleasant 63 y.o. who has come to the walk-in clinic today for ~2 weeks of ongoing cough, chest congestion, wheezing (having to use rescue albuterol MDO 4-6x/day) and colorful non bloody sputum. No nvfc/cp    + smoker.    Hx RA and on biologics.       ALLERGIES:  Codeine and Pcn [penicillins]     PMH:  Past Medical History:   Diagnosis Date    Seropositive rheumatoid arthritis (HCC) 3/22/2022    Thyroid disease     Tobacco dependence due to cigarettes         PSH:  Past Surgical History:   Procedure Laterality Date    VA INJ LUMBAR/SACRAL,W/ IMAGING N/A 3/21/2023    Procedure: Caudal epidural steroid injection with catheter. PATIENT NEEDS TO HOLD NSAIDS FOR 5 DAYS PRIOR TO INJECTION;  Surgeon: Shantelle Winkler M.D.;  Location[facility];  Service: Pain Management       MEDS:    Current Outpatient Medications:     [START ON 10/9/2023] predniSONE (DELTASONE) 20 MG Tab, Take 2 Tablets by mouth every morning for 4 days., Disp: 8 Tablet, Rfl: 0    doxycycline (VIBRAMYCIN) 100 MG Tab, Take 1 Tablet by mouth every 12 hours for 7 days., Disp: 14 Tablet, Rfl: 0    benzonatate (TESSALON) 200 MG capsule, Take 1 Capsule by mouth 3 times a day as needed for Cough., Disp: 30 Capsule, Rfl: 0    budesonide-formoterol (SYMBICORT) 80-4.5 MCG/ACT Aerosol, Inhale 2 Puffs 2 times a day., Disp: 1 Each, Rfl: 0    predniSONE (DELTASONE) 5 MG Tab, Take 1 Tablet by mouth every day. Prednisone 20 mg daily for 7 days, then decrease by 2.5 mg every 7 days until finished. Take with food, Disp: 126 Tablet, Rfl: 0    ENBREL SURECLICK 50 MG/ML Solution Auto-injector, INJECT 1 PEN UNDER THE SKIN EVERY 7 DAYS., Disp: 4 Each, Rfl: 11    leflunomide (ARAVA) 20 MG Tab, Take 1 Tablet by mouth every day., Disp: 90 Tablet, Rfl: 3    levothyroxine (SYNTHROID) 112 MCG Tab, Take 1 Tablet by  "mouth every morning on an empty stomach. Take 1 tablet by mouth every morning on an empty stomach, Disp: 90 Tablet, Rfl: 3    albuterol 108 (90 Base) MCG/ACT Aero Soln inhalation aerosol, Inhale 2 Puffs every four hours as needed for Shortness of Breath., Disp: 1 Each, Rfl: 11    gabapentin (NEURONTIN) 300 MG Cap, Take 1 Capsule by mouth at bedtime., Disp: 30 Capsule, Rfl: 2    estradiol-norethindrone (ACTIVELLA) 1-0.5 MG per tablet, Take 1 Tablet by mouth every day., Disp: 30 Tablet, Rfl: 1    cetirizine (ZYRTEC) 10 MG Tab, Take 4 tablets at night for 1 to 2 weeks then 1 tablet nightly for itching, Disp: 30 Tablet, Rfl: 2    VITAMIN D PO, Take  by mouth., Disp: , Rfl:     Calcium Carbonate (CALCIUM 500 PO), Take  by mouth., Disp: , Rfl:     ** I have documented what I find to be significant in regards to past medical, social, family and surgical history  in my HPI or under PMH/PSH/FH review section, otherwise it is noncontributory **         HPI    Review of Systems   Respiratory:  Positive for cough, sputum production and wheezing. Negative for hemoptysis.    Cardiovascular:  Negative for chest pain.   All other systems reviewed and are negative.             Objective     /74 (BP Location: Left arm, Patient Position: Sitting, BP Cuff Size: Small adult)   Pulse 82   Temp 36.3 °C (97.3 °F) (Temporal)   Resp 20   Ht 1.575 m (5' 2\")   Wt 66.3 kg (146 lb 3.2 oz)   SpO2 93%   BMI 26.74 kg/m²      Physical Exam  Vitals and nursing note reviewed.   Constitutional:       General: She is not in acute distress.     Appearance: Normal appearance. She is well-developed.   HENT:      Head: Normocephalic.      Mouth/Throat:      Mouth: Mucous membranes are moist.      Pharynx: Oropharynx is clear.   Cardiovascular:      Heart sounds: Normal heart sounds. No murmur heard.  Pulmonary:      Effort: Pulmonary effort is normal. No respiratory distress.      Breath sounds: Wheezing present.   Neurological:      Mental " Status: She is alert.      Motor: No abnormal muscle tone.   Psychiatric:         Mood and Affect: Mood normal.         Behavior: Behavior normal.                             Assessment & Plan     1. Acute wheezy bronchitis  dexamethasone (Decadron) injection (check route below) 7 mg    albuterol (Proventil) 2.5mg/3ml nebulizer solution 2.5 mg    predniSONE (DELTASONE) 20 MG Tab    doxycycline (VIBRAMYCIN) 100 MG Tab    benzonatate (TESSALON) 200 MG capsule    budesonide-formoterol (SYMBICORT) 80-4.5 MCG/ACT Aerosol      2. Long-term use of immunosuppressant medication        3. Tobacco dependence due to cigarettes            May have asthma and/or COPD exacerbation. Will start LABA/ICS &  brief OCS burst    - Dx, plan & d/c instructions discussed   - Rest, stay hydrated      Follow up with your regular primary care providers office in a week for a recheck on today's visit. ER if not improving in 2-3 days or if feeling/getting worse.     Any realistic side effects of medications that may have been given today reviewed.     Patient left in stable condition     Pertinent prior lab work and/or imaging studies in Epic have been reviewed by me today on day of this visit and taken into account for my treatment and plan today    Pertinent PMH/PSH and/or chronic conditions and medications if any were reviewed today and taken into account for my treatment and plan today    Pertinent [facility] have been reviewed by me today on day of this visit.    Please note that this dictation may have been created using voice recognition software, if so I have made every reasonable attempt to correct obvious errors, but I expect that there are errors of grammar and possibly content that I did not discover before finalizing the note.

## 2023-10-09 ENCOUNTER — TELEPHONE (OUTPATIENT)
Dept: URGENT CARE | Facility: PHYSICIAN GROUP | Age: 63
End: 2023-10-09
Payer: COMMERCIAL

## 2023-10-10 ENCOUNTER — TELEPHONE (OUTPATIENT)
Dept: URGENT CARE | Facility: PHYSICIAN GROUP | Age: 63
End: 2023-10-10
Payer: COMMERCIAL

## 2023-11-14 ENCOUNTER — HOSPITAL ENCOUNTER (OUTPATIENT)
Dept: LAB | Facility: MEDICAL CENTER | Age: 63
End: 2023-11-14
Attending: STUDENT IN AN ORGANIZED HEALTH CARE EDUCATION/TRAINING PROGRAM
Payer: COMMERCIAL

## 2023-11-14 DIAGNOSIS — M05.9 SEROPOSITIVE RHEUMATOID ARTHRITIS (HCC): ICD-10-CM

## 2023-11-14 DIAGNOSIS — D84.9 IMMUNOSUPPRESSED STATUS (HCC): ICD-10-CM

## 2023-11-14 LAB
ALBUMIN SERPL BCP-MCNC: 4.5 G/DL (ref 3.2–4.9)
ALBUMIN/GLOB SERPL: 1.6 G/DL
ALP SERPL-CCNC: 63 U/L (ref 30–99)
ALT SERPL-CCNC: 18 U/L (ref 2–50)
ANION GAP SERPL CALC-SCNC: 10 MMOL/L (ref 7–16)
AST SERPL-CCNC: 21 U/L (ref 12–45)
BASOPHILS # BLD AUTO: 1.1 % (ref 0–1.8)
BASOPHILS # BLD: 0.09 K/UL (ref 0–0.12)
BILIRUB SERPL-MCNC: 0.4 MG/DL (ref 0.1–1.5)
BUN SERPL-MCNC: 13 MG/DL (ref 8–22)
CALCIUM ALBUM COR SERPL-MCNC: 9.3 MG/DL (ref 8.5–10.5)
CALCIUM SERPL-MCNC: 9.7 MG/DL (ref 8.5–10.5)
CHLORIDE SERPL-SCNC: 105 MMOL/L (ref 96–112)
CO2 SERPL-SCNC: 26 MMOL/L (ref 20–33)
CREAT SERPL-MCNC: 0.98 MG/DL (ref 0.5–1.4)
CRP SERPL HS-MCNC: 0.6 MG/DL (ref 0–0.75)
EOSINOPHIL # BLD AUTO: 0.31 K/UL (ref 0–0.51)
EOSINOPHIL NFR BLD: 3.7 % (ref 0–6.9)
ERYTHROCYTE [DISTWIDTH] IN BLOOD BY AUTOMATED COUNT: 52 FL (ref 35.9–50)
ERYTHROCYTE [SEDIMENTATION RATE] IN BLOOD BY WESTERGREN METHOD: 8 MM/HOUR (ref 0–25)
GFR SERPLBLD CREATININE-BSD FMLA CKD-EPI: 65 ML/MIN/1.73 M 2
GLOBULIN SER CALC-MCNC: 2.8 G/DL (ref 1.9–3.5)
GLUCOSE SERPL-MCNC: 137 MG/DL (ref 65–99)
HCT VFR BLD AUTO: 48 % (ref 37–47)
HGB BLD-MCNC: 15.1 G/DL (ref 12–16)
IMM GRANULOCYTES # BLD AUTO: 0.02 K/UL (ref 0–0.11)
IMM GRANULOCYTES NFR BLD AUTO: 0.2 % (ref 0–0.9)
LYMPHOCYTES # BLD AUTO: 2.28 K/UL (ref 1–4.8)
LYMPHOCYTES NFR BLD: 27.1 % (ref 22–41)
MCH RBC QN AUTO: 30.5 PG (ref 27–33)
MCHC RBC AUTO-ENTMCNC: 31.5 G/DL (ref 32.2–35.5)
MCV RBC AUTO: 97 FL (ref 81.4–97.8)
MONOCYTES # BLD AUTO: 0.82 K/UL (ref 0–0.85)
MONOCYTES NFR BLD AUTO: 9.7 % (ref 0–13.4)
NEUTROPHILS # BLD AUTO: 4.9 K/UL (ref 1.82–7.42)
NEUTROPHILS NFR BLD: 58.2 % (ref 44–72)
NRBC # BLD AUTO: 0 K/UL
NRBC BLD-RTO: 0 /100 WBC (ref 0–0.2)
PLATELET # BLD AUTO: 304 K/UL (ref 164–446)
PMV BLD AUTO: 11.2 FL (ref 9–12.9)
POTASSIUM SERPL-SCNC: 4 MMOL/L (ref 3.6–5.5)
PROT SERPL-MCNC: 7.3 G/DL (ref 6–8.2)
RBC # BLD AUTO: 4.95 M/UL (ref 4.2–5.4)
SODIUM SERPL-SCNC: 141 MMOL/L (ref 135–145)
WBC # BLD AUTO: 8.4 K/UL (ref 4.8–10.8)

## 2023-11-14 PROCEDURE — 36415 COLL VENOUS BLD VENIPUNCTURE: CPT

## 2023-11-14 PROCEDURE — 80053 COMPREHEN METABOLIC PANEL: CPT

## 2023-11-14 PROCEDURE — 85652 RBC SED RATE AUTOMATED: CPT

## 2023-11-14 PROCEDURE — 86140 C-REACTIVE PROTEIN: CPT

## 2023-11-14 PROCEDURE — 85025 COMPLETE CBC W/AUTO DIFF WBC: CPT

## 2023-11-20 ASSESSMENT — RHEUMATOLOGY FOLLOW-UP QUESTIONNAIRE
JOINT PAIN: SAME WITH ACTIVITY
CHIEF_COMPLAINT: FOLLOW UP
HELPFUL_MEDICATIONS: VOLTARIN
CHARACTERISTIC: SAME WITH ACTIVITY
RATE_1TO10: 3
MARK ALL THE AREAS OF PAIN: 93581129
DURATION: >1 HOUR
JOINT SWELLING: Y

## 2023-11-22 ENCOUNTER — OFFICE VISIT (OUTPATIENT)
Dept: RHEUMATOLOGY | Facility: MEDICAL CENTER | Age: 63
End: 2023-11-22
Attending: STUDENT IN AN ORGANIZED HEALTH CARE EDUCATION/TRAINING PROGRAM
Payer: COMMERCIAL

## 2023-11-22 VITALS
TEMPERATURE: 98.3 F | DIASTOLIC BLOOD PRESSURE: 80 MMHG | SYSTOLIC BLOOD PRESSURE: 120 MMHG | WEIGHT: 146.5 LBS | HEART RATE: 101 BPM | HEIGHT: 62 IN | BODY MASS INDEX: 26.96 KG/M2 | OXYGEN SATURATION: 93 %

## 2023-11-22 DIAGNOSIS — M15.9 OSTEOARTHRITIS INVOLVING MULTIPLE JOINTS ON BOTH SIDES OF BODY: ICD-10-CM

## 2023-11-22 DIAGNOSIS — R76.8 SEROLOGIC AUTOIMMUNITY: ICD-10-CM

## 2023-11-22 DIAGNOSIS — M05.9 SEROPOSITIVE RHEUMATOID ARTHRITIS (HCC): ICD-10-CM

## 2023-11-22 DIAGNOSIS — D84.9 IMMUNOSUPPRESSED STATUS (HCC): ICD-10-CM

## 2023-11-22 PROCEDURE — 3074F SYST BP LT 130 MM HG: CPT | Performed by: STUDENT IN AN ORGANIZED HEALTH CARE EDUCATION/TRAINING PROGRAM

## 2023-11-22 PROCEDURE — 99212 OFFICE O/P EST SF 10 MIN: CPT | Performed by: STUDENT IN AN ORGANIZED HEALTH CARE EDUCATION/TRAINING PROGRAM

## 2023-11-22 PROCEDURE — 3079F DIAST BP 80-89 MM HG: CPT | Performed by: STUDENT IN AN ORGANIZED HEALTH CARE EDUCATION/TRAINING PROGRAM

## 2023-11-22 PROCEDURE — 99215 OFFICE O/P EST HI 40 MIN: CPT | Performed by: STUDENT IN AN ORGANIZED HEALTH CARE EDUCATION/TRAINING PROGRAM

## 2023-11-22 RX ORDER — PREDNISONE 5 MG/1
TABLET ORAL
Qty: 126 TABLET | Refills: 0 | Status: SHIPPED | OUTPATIENT
Start: 2023-11-22 | End: 2024-02-22 | Stop reason: DRUGHIGH

## 2023-11-22 RX ORDER — ADALIMUMAB 40MG/0.4ML
40 KIT SUBCUTANEOUS
Qty: 2 EACH | Refills: 5 | Status: SHIPPED | OUTPATIENT
Start: 2023-11-22 | End: 2024-02-07

## 2023-11-22 ASSESSMENT — FIBROSIS 4 INDEX: FIB4 SCORE: 1.01

## 2023-11-22 NOTE — PATIENT INSTRUCTIONS
AFTER VISIT INSTRUCTIONS    Below are important information to help you navigate your healthcare needs and help us serve you safely and effectively:  If laboratory tests and/or imaging studies were ordered, remember to go get them done as instructed.  If new prescriptions and/or refills were sent, remember to go pick them up from your local pharmacy, or call the specialty pharmacy to request shipment.  Always take your prescription medications exactly as prescribed unless instructed otherwise.  Note that antirheumatic drugs and steroids are immunosuppressive which means they increase your risk of infections and have multiple potential adverse effects on various organ systems in your body, though most of them are uncommon.  It is important that you are up-to-date on age-appropriate immunizations, particularly shingles and bacterial/viral pneumonia vaccines, which you can request from me or your primary care provider.  Be sure to read the drug package inserts to learn about the potential side effects of your medications before you start taking them.  If you experience any significant drug side effects, stop taking the medication and notify me promptly, and depending on the severity of the side effects, consider going to an urgent care or emergency department for immediate attention.  If there are significant findings on your lab tests and imaging studies that warrant further action, I will notify you with explanations via [employer] or phone call, otherwise you can view them on [employer] and let me know if you have any questions.  Note that [employer] messages are typically read during office hours and may take 1-7 business days before a response depending on the urgency of the situation and how busy my clinic schedule is.  In general, [employer] messaging is for non-urgent matters that do not require immediate attention, so for urgent matters that cannot wait, you are advised to go to an urgent care.  Lastly, you are granted  Laurent access to my documentation of your visit and are encouraged to read my note which details my assessment and plan for your condition.  To learn more about your condition and rheumatic diseases evaluated and treated by rheumatologists, as well as gain access to many helpful resources about these diseases, visit our website at [email or web address].

## 2023-11-22 NOTE — PROGRESS NOTES
[facility] RHEUMATOLOGY  [address] [facility], [address], [place] [address]  Phone: [phone] ? Fax: [phone]  [facility]/Health-Services/Rheumatology    FOLLOW-UP VISIT NOTE      DATE OF SERVICE: 11/22/2023         Subjective     PRIMARY CARE PRACTITIONER:  Cheryl M. Demucha, A.P.R.N.  [address] [place] [address]    PATIENT IDENTIFICATION:  Diane Crowell  [address] [place] [address]  [place] [address]    YOB: 1960    MEDICAL RECORD NUMBER: [id number]          CHIEF COMPLAINT:   Chief Complaint   Patient presents with    Follow-Up     Seropositive rheumatoid arthritis (HCC)       RHEUMATOLOGIC HISTORY:  Diane Crowell is a 63 y.o. female with pertinent history notable for seropositive rheumatoid arthritis diagnosed in 3/2022, osteoarthritis of multiple joints (hands and feet), and Hashimoto's thyroiditis with hypothyroidism. She initially presented on 3/22/22, reported onset of polyarthralgia in 7/2021 preceded by hypersensitivity rash from weeding at her yard. Since then she had been treated with several one-week courses of steroids and was on prednisone 2.5 mg daily when evaluated. With each course of steroid she experienced improvement but flared soon after completing the treatment. In addition, she has been managing with meloxicam 7.5 mg daily which she had been taken for a long time. She reported ongoing pain/swelling in her hands, wrists, and feet, as well as pain in her lower back and hips, associated with morning stiffness lasting all day and improving with activity.     Pertinent treatments: Prednisone 30>20 mg tapers during flares (on/off since 3/2022-9/2023, effective), methotrexate 15-20 mg weekly (3/2022-11/2022, ineffective), leflunomide 20 mg daily (11/2022-4/2023, flared on Enbrel alone, so restarted 5/2023-present), Enbrel 50 mg SC weekly (1/2023-12/2023, stopped due to formation of anti-Etanercept antibody), Humira 40 mg SC every 2 weeks (ordered 11/22/23-present).      Pertinent positive labs: Positive RF 80, anti-CCP 81, and JORGE 1:320 homogenous/cytoplasmic patterns with negative reflex panel (in 3/2022); positive anti-TPO 51.2 and anti-.8 (in 7/2021); positive anti-Etanercept antibody 540 ng/mL (in 7/2023).    Pertinent negative labs: Negative HLA-B27 (in 10/2022), vitamin D 49<26 (in 12/2022), HBV, HCV, and QTB (in 1/2023), CRP, ESR, eGFR, creatinine, LFTs, and CBC (in 11/2023).     Pertinent XR imaging: Hands (in 3/2022) with typical osteoarthritis of bilateral 1st carpometacarpal joint and distal interphalangeal joint. Feet (in 3/2022) with postoperative changes of the right 5th proximal phalanx in the left 1st metatarsal head, osteoarthritis of both 1st metatarsophalangeal joint, and right accessory navicular, anatomic variant.      INTERVAL HISTORY:  Reports interval history as noted on the questionnaire below or scanned under media tab.  [facility] Rheumatology Established Patient History Form    11/20/2023  4:43 PM PST - Filed by Patient   MAIN REASON FOR VISIT Follow up   INTERVAL HISTORY OF ILLNESS   Date of worsening onset: 11/17/23   Preceding incident/ailment:    Describe/list your symptoms: Left wrist and hand swelling. Some pain   Exacerbating factors:    Alleviating factors:    Helpful medications: Voltarin   Ineffective medications:    Severity of pain (scale of 1-10): 3   Personal/emotional stressors:    Mateusz All The Areas Of Pain    REVIEW OF SYMPTOMS    General   Fevers    Chills    Night sweats    Malaise    Fatigue    Unintentional weight loss    Musculoskeletal   Joint pain Same with activity   Morning stiffness duration >1 hour   Morning stiffness characteristic Same with activity   Joint swelling Yes   Joint instability    Tendon pain    Muscle pain    Body aches    Dermatologic   Hair loss with bald spots    Hair shedding    Skin thickening    Skin plaques    Sunlight-induced skin rash    Cold-induced color changes (white, purple, red on rewarming)     Neurologic/Psychiatric   Weakness    Spasms    Tingling    Burning    Numbness    Insomnia    Anxiety    Depression    Head/Eyes   Headaches    Temple pain    Dizziness    Dry eyes    Eye pain    Eye redness    Blurry vision    Vision loss    Ears/Nose   Ear pain    Ringing in ears    Vertigo    Hearing loss    Nasal ulcers    Nosebleeds    Sinus pain    Nasal congestion    Snoring    Mouth/Throat   Oral ulcers    Bleeding gums    Dry mouth    Cavities    Sore throat    Sticking in throat    Difficulty speaking    Neck/Lymphatics   Thyroid pain    Thyroid swelling    Lymph node swelling    Cardiac/Respiratory   Chest pain with breathing    Dry cough    Cough with bloody phlegm    Shortness of breath    Fast heartbeats    Irregular heartbeats    Gastrointestinal   Nausea    Vomiting    Difficulty swallowing    Heartburn    Abdominal pain    Bloody stool    Mucus stool    Genitourinary   Pelvic pain    Genital ulcers    Abnormal discharge    Burning urination    Frothy urine    Blood in urine        REVIEW OF SYSTEMS:  Except as noted in the history above, relevant review of systems with emphasis on autoimmune rheumatic diseases was otherwise negative.      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) 03/22/2022    Swelling of both hands 03/08/2022    Cellulitis of right upper extremity 01/26/2022    Psoriasiform eruption 12/07/2021    Hashimoto's thyroiditis 08/02/2021    Vitamin D deficiency 08/02/2021    Postmenopausal 08/02/2021    Tobacco dependence due to  cigarettes 08/02/2021       PAST MEDICAL HISTORY:  Past Medical History:   Diagnosis Date    Seropositive rheumatoid arthritis (HCC) 3/22/2022    Thyroid disease     Tobacco dependence due to cigarettes        PAST SURGICAL HISTORY:  Past Surgical History:   Procedure Laterality Date    MO INJ LUMBAR/SACRAL,W/ IMAGING N/A 3/21/2023    Procedure: Caudal epidural steroid injection with catheter. PATIENT NEEDS TO HOLD NSAIDS FOR 5 DAYS PRIOR TO INJECTION;  Surgeon: Shantelle Winkler M.D.;  [facility];  Service: Pain Management       MEDICATIONS:  Current Outpatient Medications   Medication Sig    predniSONE (DELTASONE) 5 MG Tab Take 20 mg daily for 7 days, then decrease by 2.5 mg every 7 days until finished. Take in the morning with food.    Adalimumab (HUMIRA PEN) 40 MG/0.4ML Pen-injector Kit Inject 40 mg under the skin every 14 days.    benzonatate (TESSALON) 200 MG capsule Take 1 Capsule by mouth 3 times a day as needed for Cough.    budesonide-formoterol (SYMBICORT) 80-4.5 MCG/ACT Aerosol Inhale 2 Puffs 2 times a day.    leflunomide (ARAVA) 20 MG Tab Take 1 Tablet by mouth every day.    levothyroxine (SYNTHROID) 112 MCG Tab Take 1 Tablet by mouth every morning on an empty stomach. Take 1 tablet by mouth every morning on an empty stomach    albuterol 108 (90 Base) MCG/ACT Aero Soln inhalation aerosol Inhale 2 Puffs every four hours as needed for Shortness of Breath.    gabapentin (NEURONTIN) 300 MG Cap Take 1 Capsule by mouth at bedtime.    estradiol-norethindrone (ACTIVELLA) 1-0.5 MG per tablet Take 1 Tablet by mouth every day.    VITAMIN D PO Take  by mouth.    Calcium Carbonate (CALCIUM 500 PO) Take  by mouth.       ALLERGIES:   Allergies   Allergen Reactions    Codeine     Pcn [Penicillins]        IMMUNIZATIONS:  Immunization History   Administered Date(s) Administered    Influenza Seasonal Injectable - Historical Data 12/18/2012    Influenza Vac Subunit Quad Inj (Pf) 10/15/2018     "Influenza Vaccine Quad Inj (Pf) 10/07/2017, 10/18/2019, 10/15/2022    Influenza Vaccine Quad Recombinant 10/06/2020, 11/18/2021    MODERNA BIVALENT BOOSTER SARS-COV-2 VACCINE (6+) 02/03/2023    MODERNA SARS-COV-2 VACCINE (12+) 06/04/2021, 07/02/2021    Zoster Vaccine Live (ZVL) (Zostavax) - HISTORICAL DATA 09/20/2016    Zoster Vaccine Recombinant (RZV) (SHINGRIX) 10/15/2022, 01/26/2023       SOCIAL HISTORY:   Social History     Socioeconomic History    Marital status:    Tobacco Use    Smoking status: Every Day     Current packs/day: 1.00     Average packs/day: 1 pack/day for 23.0 years (23.0 ttl pk-yrs)     Types: Cigarettes    Smokeless tobacco: Never   Vaping Use    Vaping Use: Never used   Substance and Sexual Activity    Alcohol use: Yes     Comment: occ    Drug use: Never    Sexual activity: Yes     Partners: Male     Birth control/protection: Post-Menopausal       FAMILY HISTORY:  Family History   Problem Relation Age of Onset    Other Father         ALS    Lung Disease Paternal Grandmother             Objective     Vital Signs: /80 (BP Location: Left arm, Patient Position: Sitting, BP Cuff Size: Adult)   Pulse (!) 101   Temp 36.8 °C (98.3 °F) (Temporal)   Ht 1.575 m (5' 2\")   Wt 66.5 kg (146 lb 8 oz)   SpO2 93% Body mass index is 26.8 kg/m².    General: Appears well and comfortable  Eyes: No scleral or conjunctival lesions  ENT: No apparent oral or nasal lesions  Head/Neck: No apparent scalp or neck lesions  Cardiovascular: Regular rate and rhythm  Respiratory: Breathing quiet and unlabored  Gastrointestinal: No apparent organomegaly or abdominal masses  Integumentary: No significant cutaneous lesions or dyspigmentation  Musculoskeletal: Mild to moderate tenderness of left hand MCP joints with periarticular soft tissue swelling consistent with synovitis; range of motion of left hand relatively limited by pain  Neurologic: No focal sensory or motor deficits  Psychiatric: Mood and affect " appropriate      LABORATORY RESULTS REVIEWED AND INTERPRETED BY ME:  Lab Results   Component Value Date/Time    CREACTPROT 0.60 11/14/2023 03:50 PM    SEDRATEWES 8 11/14/2023 03:50 PM     Lab Results   Component Value Date/Time    RHEUMFACTN 80 (H) 03/08/2022 02:32 PM    CCPANTIBODY 81 (H) 03/23/2022 09:46 AM    COTN27IFRV Negative 10/26/2022 03:55 PM     Lab Results   Component Value Date/Time    ANTINUCAB Detected (A) 03/23/2022 09:46 AM     Lab Results   Component Value Date/Time    ANTIDNADS 15 03/23/2022 09:46 AM    SMITHAB 3 03/23/2022 09:46 AM    RNPAB 7 03/23/2022 09:46 AM    BBPCMDZ26 9 03/23/2022 09:46 AM    SSA60 3 03/23/2022 09:46 AM    SSA52 1 03/23/2022 09:46 AM    ANTISSBSJ 0 03/23/2022 09:46 AM    JO1AB 1 03/23/2022 09:46 AM     Lab Results   Component Value Date/Time    MICROSOMALA 51.2 (H) 07/24/2021 09:09 AM    ANTITHYROGL 286.8 (H) 07/24/2021 09:09 AM    TSHULTRASEN 1.300 12/14/2022 11:39 AM     Lab Results   Component Value Date/Time    25HYDROXY 49 12/14/2022 11:39 AM     Lab Results   Component Value Date/Time    WBC 8.4 11/14/2023 03:50 PM    RBC 4.95 11/14/2023 03:50 PM    HEMOGLOBIN 15.1 11/14/2023 03:50 PM    HEMATOCRIT 48.0 (H) 11/14/2023 03:50 PM    MCV 97.0 11/14/2023 03:50 PM    MCH 30.5 11/14/2023 03:50 PM    MCHC 31.5 (L) 11/14/2023 03:50 PM    RDW 52.0 (H) 11/14/2023 03:50 PM    PLATELETCT 304 11/14/2023 03:50 PM    MPV 11.2 11/14/2023 03:50 PM    NEUTS 4.90 11/14/2023 03:50 PM    LYMPHOCYTES 27.10 11/14/2023 03:50 PM    MONOCYTES 9.70 11/14/2023 03:50 PM    EOSINOPHILS 3.70 11/14/2023 03:50 PM    BASOPHILS 1.10 11/14/2023 03:50 PM     Lab Results   Component Value Date/Time    ASTSGOT 21 11/14/2023 03:50 PM    ALTSGPT 18 11/14/2023 03:50 PM    ALKPHOSPHAT 63 11/14/2023 03:50 PM    TBILIRUBIN 0.4 11/14/2023 03:50 PM    TOTPROTEIN 7.3 11/14/2023 03:50 PM    ALBUMIN 4.5 11/14/2023 03:50 PM     Lab Results   Component Value Date/Time    SODIUM 141 11/14/2023 03:50 PM    POTASSIUM 4.0  11/14/2023 03:50 PM    CHLORIDE 105 11/14/2023 03:50 PM    CO2 26 11/14/2023 03:50 PM    GLUCOSE 137 (H) 11/14/2023 03:50 PM    BUN 13 11/14/2023 03:50 PM    CREATININE 0.98 11/14/2023 03:50 PM    CALCIUM 9.7 11/14/2023 03:50 PM     Lab Results   Component Value Date/Time    HEPBSAG Non-Reactive 01/19/2023 02:50 PM    HEPCAB Non-Reactive 01/19/2023 02:50 PM     Lab Results   Component Value Date/Time    CHOLSTRLTOT 203 (H) 12/14/2022 11:39 AM     (H) 12/14/2022 11:39 AM    HDL 85 12/14/2022 11:39 AM    TRIGLYCERIDE 72 12/14/2022 11:39 AM       RADIOLOGY RESULTS REVIEWED AND INTERPRETED BY ME:  Results for orders placed in visit on 03/23/22    DX-JOINT SURVEY-FEET SINGLE VIEW    Impression  1.  No evidence for inflammatory arthritis    2.  Postoperative changes of the right 5th proximal phalanx in the left 1st metatarsal head    3.  osteoarthritis of both 1st metatarsophalangeal joint    4.  Right accessory navicular, anatomic variant    Results for orders placed in visit on 03/23/22    DX-JOINT SURVEY-HANDS SINGLE VIEW    Impression  1.  No evidence for erosive arthritis    2.  Typical osteoarthritis of bilateral 1st carpometacarpal joint and distal interphalangeal joint      All relevant laboratory and imaging results reported on this note were reviewed and interpreted by me.         Assessment & Plan     Diane Crowell is a 63 y.o. female with history as noted above whose presentation merits the following clinical impressions and recommendations:    1. Seropositive rheumatoid arthritis (HCC)  Clinically active disease with inadequate control on the current regimen of Enbrel and leflunomide. She has unfortunately developed drug neutralizing antibodies against Enbrel (i.e. positive anti-Etanercept antibody), so need to be switched to Humira. In the meantime, will give another course of prednisone taper to get her through until Humira starts working.  - CRP QUANTITIVE (NON-CARDIAC); Future  - Sed Rate;  Future  - Adalimumab (HUMIRA PEN) 40 MG/0.4ML Pen-injector Kit; Inject 40 mg under the skin every 14 days.  Dispense: 2 Each; Refill: 5  - predniSONE (DELTASONE) 5 MG Tab; Take 20 mg daily for 7 days, then decrease by 2.5 mg every 7 days until finished. Take in the morning with food.  Dispense: 126 Tablet; Refill: 0  - Continue leflunomide 20 mg daily as prescribed  - Discontinue Enbrel 50 mg SC weekly after finishing the remaining 3 doses    2. Osteoarthritis involving multiple joints on both sides of body (hands and feet)  Considered a significant component of her overall peripheral joint pain which can be managed supportively.  - Voltaren 1% gel and Tylenol Arthritis with or without oral NSAIDs as needed  - Consider intra-articular steroid injection if that becomes necessary    3. 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  - Consider repeat testing depending on her clinical trajectory    4. Immunosuppressed status (HCC)  Presently with no history, physical, or laboratory evidence to suggest significant adverse drug effects or opportunistic infections, but need routine monitoring per guidelines.  - CBC WITH DIFFERENTIAL; Future  - Comp Metabolic Panel; Future  - Need to ascertain age-appropriate vaccines in addition to the ones listed above under immunizations      The above assessment and plan were discussed with the patient who acknowledged understanding of the plan.    FOLLOW-UP: Return in about 3 months (around 2/22/2024) for Short.         Thank you for the opportunity to participate in the care of Diane Crowell.    Josr Mayer MD, MS, FACR  Rheumatologist, [facility] Rheumatology ? [facility]   of Clinical Medicine, Department of Internal Medicine  [facility] ?  [facility]

## 2023-11-27 ENCOUNTER — TELEPHONE (OUTPATIENT)
Dept: RHEUMATOLOGY | Facility: MEDICAL CENTER | Age: 63
End: 2023-11-27
Payer: COMMERCIAL

## 2023-11-27 NOTE — TELEPHONE ENCOUNTER
Received request for Humira, ran a test claim and a PA is required.    Submitted PA via CoverMyMeds key is [id number]

## 2023-11-30 NOTE — TELEPHONE ENCOUNTER
PA has been approved through 11/27/2023 but must go through [facility].      Spoke with patient and she confirmed she has scheduled a delivery of her Humira for next week

## 2024-01-04 ENCOUNTER — OFFICE VISIT (OUTPATIENT)
Dept: MEDICAL GROUP | Facility: PHYSICIAN GROUP | Age: 64
End: 2024-01-04
Payer: COMMERCIAL

## 2024-01-04 VITALS
RESPIRATION RATE: 16 BRPM | HEART RATE: 83 BPM | OXYGEN SATURATION: 95 % | BODY MASS INDEX: 27.79 KG/M2 | WEIGHT: 151 LBS | HEIGHT: 62 IN | DIASTOLIC BLOOD PRESSURE: 80 MMHG | SYSTOLIC BLOOD PRESSURE: 122 MMHG | TEMPERATURE: 97.4 F

## 2024-01-04 DIAGNOSIS — Z78.0 POSTMENOPAUSAL: ICD-10-CM

## 2024-01-04 DIAGNOSIS — E78.5 DYSLIPIDEMIA: ICD-10-CM

## 2024-01-04 DIAGNOSIS — F17.210 TOBACCO DEPENDENCE DUE TO CIGARETTES: ICD-10-CM

## 2024-01-04 DIAGNOSIS — L57.0 ACTINIC KERATOSES: ICD-10-CM

## 2024-01-04 DIAGNOSIS — Z12.11 SCREENING FOR COLORECTAL CANCER: ICD-10-CM

## 2024-01-04 DIAGNOSIS — J20.9 ACUTE WHEEZY BRONCHITIS: ICD-10-CM

## 2024-01-04 DIAGNOSIS — Z12.83 SKIN CANCER SCREENING: ICD-10-CM

## 2024-01-04 DIAGNOSIS — Z12.12 SCREENING FOR COLORECTAL CANCER: ICD-10-CM

## 2024-01-04 DIAGNOSIS — E55.9 VITAMIN D DEFICIENCY: ICD-10-CM

## 2024-01-04 DIAGNOSIS — Z78.0 MENOPAUSE: ICD-10-CM

## 2024-01-04 DIAGNOSIS — E03.9 HYPOTHYROIDISM, UNSPECIFIED TYPE: ICD-10-CM

## 2024-01-04 PROCEDURE — 17000 DESTRUCT PREMALG LESION: CPT | Performed by: FAMILY MEDICINE

## 2024-01-04 PROCEDURE — 3079F DIAST BP 80-89 MM HG: CPT | Performed by: FAMILY MEDICINE

## 2024-01-04 PROCEDURE — 17003 DESTRUCT PREMALG LES 2-14: CPT | Performed by: FAMILY MEDICINE

## 2024-01-04 PROCEDURE — 99214 OFFICE O/P EST MOD 30 MIN: CPT | Mod: 25 | Performed by: FAMILY MEDICINE

## 2024-01-04 PROCEDURE — 3074F SYST BP LT 130 MM HG: CPT | Performed by: FAMILY MEDICINE

## 2024-01-04 RX ORDER — BUDESONIDE AND FORMOTEROL FUMARATE DIHYDRATE 80; 4.5 UG/1; UG/1
2 AEROSOL RESPIRATORY (INHALATION) 2 TIMES DAILY
Qty: 3 EACH | Refills: 3 | Status: SHIPPED | OUTPATIENT
Start: 2024-01-04

## 2024-01-04 ASSESSMENT — FIBROSIS 4 INDEX: FIB4 SCORE: 1.01

## 2024-01-04 ASSESSMENT — PATIENT HEALTH QUESTIONNAIRE - PHQ9: CLINICAL INTERPRETATION OF PHQ2 SCORE: 0

## 2024-01-04 NOTE — PROGRESS NOTES
Subjective:   Diane Crowell is a 63 y.o. female here today for evaluation and management of:     Actinic keratoses  Rough raised skin on left arm, right shoulder and right hand dorsum.   Skin cleaned with alcohol wipes, cryotherapy with pulse application till ice ball persisted x 2 applications.     Hypothyroidism  History of hashimotos thyroiditis.   Pt is stable on levothyroxine 112 mcg  Repeat tsh ordered  She has no weight loss, palpitations or diarrhea    Tobacco dependence due to cigarettes  Precontemplative she has tried some methods in the past to quit.   Declines prescriptions for wellbutrin, chantix, patches or gum today.   Ref to lung cancer screening provided  She has no new cough, weight loss, hemoptysis or LAD    Postmenopausal  Patient has not had periods since her 30s  She has been on HRT for many years managed by her ob/gyn specialist.   She notes she is up to date on pap and mammograms done at [place]  Dexa ordered  Advised continue vitamin d             Current medicines (including changes today)  Current Outpatient Medications   Medication Sig Dispense Refill    budesonide-formoterol (SYMBICORT) 80-4.5 MCG/ACT Aerosol Inhale 2 Puffs 2 times a day. 3 Each 3    predniSONE (DELTASONE) 5 MG Tab Take 20 mg daily for 7 days, then decrease by 2.5 mg every 7 days until finished. Take in the morning with food. 126 Tablet 0    Adalimumab (HUMIRA PEN) 40 MG/0.4ML Pen-injector Kit Inject 40 mg under the skin every 14 days. 2 Each 5    benzonatate (TESSALON) 200 MG capsule Take 1 Capsule by mouth 3 times a day as needed for Cough. 30 Capsule 0    leflunomide (ARAVA) 20 MG Tab Take 1 Tablet by mouth every day. 90 Tablet 3    levothyroxine (SYNTHROID) 112 MCG Tab Take 1 Tablet by mouth every morning on an empty stomach. Take 1 tablet by mouth every morning on an empty stomach 90 Tablet 3    albuterol 108 (90 Base) MCG/ACT Aero Soln inhalation aerosol Inhale 2 Puffs every four hours as needed for  "Shortness of Breath. 1 Each 11    estradiol-norethindrone (ACTIVELLA) 1-0.5 MG per tablet Take 1 Tablet by mouth every day. 30 Tablet 1    VITAMIN D PO Take  by mouth.      Calcium Carbonate (CALCIUM 500 PO) Take  by mouth.       No current facility-administered medications for this visit.     She  has a past medical history of Seropositive rheumatoid arthritis (HCC) (3/22/2022), Thyroid disease, and Tobacco dependence due to cigarettes.    She has no past medical history of Anemia, Anxiety, Arrhythmia, Asthma, Blood transfusion without reported diagnosis, Cancer (HCC), Clotting disorder (HCC), COPD (chronic obstructive pulmonary disease) (HCC), Depression, Diabetes (HCC), GERD (gastroesophageal reflux disease), Glaucoma, Heart attack (HCC), Heart murmur, HIV (human immunodeficiency virus infection) (HCC), Hyperlipidemia, Hypertension, Kidney disease, Migraine, Pulmonary emphysema (HCC), Seizure (HCC), Stroke (HCC), or Tuberculosis.    ROS  No chest pain, no shortness of breath, no abdominal pain       Objective:     /80 (BP Location: Left arm, Patient Position: Sitting, BP Cuff Size: Adult)   Pulse 83   Temp 36.3 °C (97.4 °F) (Temporal)   Resp 16   Ht 1.575 m (5' 2\")   Wt 68.5 kg (151 lb)   SpO2 95%  Body mass index is 27.62 kg/m².   Physical Exam:  Constitutional: Alert, no distress.  Skin: Warm, dry, good turgor, no rashes in visible areas.  Eye: Equal, round and reactive, conjunctiva clear, lids normal.  ENMT: Lips without lesions, good dentition, oropharynx clear.  Neck: Trachea midline, no masses, no thyromegaly. No cervical or supraclavicular lymphadenopathy  Respiratory: Unlabored respiratory effort, lungs clear to auscultation, no wheezes, no ronchi.  Cardiovascular: Normal S1, S2, no murmur, no edema.  Abdomen: Soft, non-tender, no masses, no hepatosplenomegaly.  Psych: Alert and oriented x3, normal affect and mood.        Assessment and Plan:   The following treatment plan was discussed    1. " Screening for colorectal cancer  - Referral to GI for Colonoscopy    2. Tobacco dependence due to cigarettes  - REFERRAL TO LUNG CANCER SCREENING PROGRAM    3. Acute wheezy bronchitis  - budesonide-formoterol (SYMBICORT) 80-4.5 MCG/ACT Aerosol; Inhale 2 Puffs 2 times a day.  Dispense: 3 Each; Refill: 3    4. Vitamin D deficiency  - VITAMIN D,25 HYDROXY (DEFICIENCY); Future    5. Hypothyroidism, unspecified type  - TSH WITH REFLEX TO FT4; Future    6. Dyslipidemia  - Lipid Profile; Future    7. Menopause  - DS-BONE DENSITY STUDY (DEXA); Future    8. Actinic keratoses  - Referral to Dermatology    9. Skin cancer screening  - Referral to Dermatology    10. Postmenopausal      Followup: Return in about 6 months (around 7/4/2024) for Lab Review.

## 2024-01-04 NOTE — ASSESSMENT & PLAN NOTE
Rough raised skin on left arm, right shoulder and right hand dorsum.   Skin cleaned with alcohol wipes, cryotherapy with pulse application till ice ball persisted x 2 applications.

## 2024-01-04 NOTE — ASSESSMENT & PLAN NOTE
Precontemplative she has tried some methods in the past to quit.   Declines prescriptions for wellbutrin, chantix, patches or gum today.   Ref to lung cancer screening provided  She has no new cough, weight loss, hemoptysis or LAD

## 2024-01-04 NOTE — ASSESSMENT & PLAN NOTE
History of hashimotos thyroiditis.   Pt is stable on levothyroxine 112 mcg  Repeat tsh ordered  She has no weight loss, palpitations or diarrhea

## 2024-01-04 NOTE — ASSESSMENT & PLAN NOTE
Patient has not had periods since her 30s  She has been on HRT for many years managed by her ob/gyn specialist.   She notes she is up to date on pap and mammograms done at [place]  Yony ordered  Advised continue vitamin d

## 2024-01-06 RX ORDER — PREDNISONE 20 MG/1
TABLET ORAL
Qty: 8 TABLET | Refills: 0 | OUTPATIENT
Start: 2024-01-06

## 2024-01-17 ENCOUNTER — TELEPHONE (OUTPATIENT)
Dept: HEALTH INFORMATION MANAGEMENT | Facility: OTHER | Age: 64
End: 2024-01-17

## 2024-01-17 NOTE — TELEPHONE ENCOUNTER
Outcome: Pt requested call back after 3pm    Please transfer to Patient Outreach Team at [phone] when patient returns call.    Attempt # 1

## 2024-01-25 NOTE — TELEPHONE ENCOUNTER
Outcome: Left Message    Please transfer to Patient Outreach Team at [phone] when patient returns call.    Attempt # 2

## 2024-02-06 ENCOUNTER — HOSPITAL ENCOUNTER (OUTPATIENT)
Dept: RADIOLOGY | Facility: MEDICAL CENTER | Age: 64
End: 2024-02-06
Attending: FAMILY MEDICINE
Payer: COMMERCIAL

## 2024-02-06 DIAGNOSIS — Z78.0 MENOPAUSE: ICD-10-CM

## 2024-02-06 PROCEDURE — 77080 DXA BONE DENSITY AXIAL: CPT

## 2024-02-07 DIAGNOSIS — M05.9 SEROPOSITIVE RHEUMATOID ARTHRITIS (HCC): ICD-10-CM

## 2024-02-07 RX ORDER — ADALIMUMAB-BWWD 40 MG/.4ML
SOLUTION SUBCUTANEOUS
Qty: 0.8 ML | Refills: 11 | Status: SHIPPED | OUTPATIENT
Start: 2024-02-07

## 2024-02-13 ENCOUNTER — HOSPITAL ENCOUNTER (OUTPATIENT)
Dept: LAB | Facility: MEDICAL CENTER | Age: 64
End: 2024-02-13
Attending: FAMILY MEDICINE
Payer: COMMERCIAL

## 2024-02-13 ENCOUNTER — HOSPITAL ENCOUNTER (OUTPATIENT)
Dept: LAB | Facility: MEDICAL CENTER | Age: 64
End: 2024-02-13
Attending: STUDENT IN AN ORGANIZED HEALTH CARE EDUCATION/TRAINING PROGRAM
Payer: COMMERCIAL

## 2024-02-13 DIAGNOSIS — M05.9 SEROPOSITIVE RHEUMATOID ARTHRITIS (HCC): ICD-10-CM

## 2024-02-13 DIAGNOSIS — E78.5 DYSLIPIDEMIA: ICD-10-CM

## 2024-02-13 DIAGNOSIS — D84.9 IMMUNOSUPPRESSED STATUS (HCC): ICD-10-CM

## 2024-02-13 DIAGNOSIS — E03.9 HYPOTHYROIDISM, UNSPECIFIED TYPE: ICD-10-CM

## 2024-02-13 DIAGNOSIS — E55.9 VITAMIN D DEFICIENCY: ICD-10-CM

## 2024-02-13 LAB
25(OH)D3 SERPL-MCNC: 47 NG/ML (ref 30–100)
ALBUMIN SERPL BCP-MCNC: 3.9 G/DL (ref 3.2–4.9)
ALBUMIN/GLOB SERPL: 1.4 G/DL
ALP SERPL-CCNC: 59 U/L (ref 30–99)
ALT SERPL-CCNC: 18 U/L (ref 2–50)
ANION GAP SERPL CALC-SCNC: 11 MMOL/L (ref 7–16)
AST SERPL-CCNC: 14 U/L (ref 12–45)
BASOPHILS # BLD AUTO: 1.1 % (ref 0–1.8)
BASOPHILS # BLD: 0.09 K/UL (ref 0–0.12)
BILIRUB SERPL-MCNC: 0.4 MG/DL (ref 0.1–1.5)
BUN SERPL-MCNC: 11 MG/DL (ref 8–22)
CALCIUM ALBUM COR SERPL-MCNC: 9 MG/DL (ref 8.5–10.5)
CALCIUM SERPL-MCNC: 8.9 MG/DL (ref 8.5–10.5)
CHLORIDE SERPL-SCNC: 106 MMOL/L (ref 96–112)
CHOLEST SERPL-MCNC: 173 MG/DL (ref 100–199)
CO2 SERPL-SCNC: 22 MMOL/L (ref 20–33)
CREAT SERPL-MCNC: 0.87 MG/DL (ref 0.5–1.4)
CRP SERPL HS-MCNC: 0.63 MG/DL (ref 0–0.75)
EOSINOPHIL # BLD AUTO: 0.44 K/UL (ref 0–0.51)
EOSINOPHIL NFR BLD: 5.4 % (ref 0–6.9)
ERYTHROCYTE [DISTWIDTH] IN BLOOD BY AUTOMATED COUNT: 45.7 FL (ref 35.9–50)
ERYTHROCYTE [SEDIMENTATION RATE] IN BLOOD BY WESTERGREN METHOD: 8 MM/HOUR (ref 0–25)
FASTING STATUS PATIENT QL REPORTED: NORMAL
GFR SERPLBLD CREATININE-BSD FMLA CKD-EPI: 75 ML/MIN/1.73 M 2
GLOBULIN SER CALC-MCNC: 2.7 G/DL (ref 1.9–3.5)
GLUCOSE SERPL-MCNC: 88 MG/DL (ref 65–99)
HCT VFR BLD AUTO: 46.7 % (ref 37–47)
HDLC SERPL-MCNC: 62 MG/DL
HGB BLD-MCNC: 15.8 G/DL (ref 12–16)
IMM GRANULOCYTES # BLD AUTO: 0.02 K/UL (ref 0–0.11)
IMM GRANULOCYTES NFR BLD AUTO: 0.2 % (ref 0–0.9)
LDLC SERPL CALC-MCNC: 97 MG/DL
LYMPHOCYTES # BLD AUTO: 2.11 K/UL (ref 1–4.8)
LYMPHOCYTES NFR BLD: 26.1 % (ref 22–41)
MCH RBC QN AUTO: 31.8 PG (ref 27–33)
MCHC RBC AUTO-ENTMCNC: 33.8 G/DL (ref 32.2–35.5)
MCV RBC AUTO: 94 FL (ref 81.4–97.8)
MONOCYTES # BLD AUTO: 0.87 K/UL (ref 0–0.85)
MONOCYTES NFR BLD AUTO: 10.8 % (ref 0–13.4)
NEUTROPHILS # BLD AUTO: 4.56 K/UL (ref 1.82–7.42)
NEUTROPHILS NFR BLD: 56.4 % (ref 44–72)
NRBC # BLD AUTO: 0 K/UL
NRBC BLD-RTO: 0 /100 WBC (ref 0–0.2)
PLATELET # BLD AUTO: 244 K/UL (ref 164–446)
PMV BLD AUTO: 11.2 FL (ref 9–12.9)
POTASSIUM SERPL-SCNC: 4.2 MMOL/L (ref 3.6–5.5)
PROT SERPL-MCNC: 6.6 G/DL (ref 6–8.2)
RBC # BLD AUTO: 4.97 M/UL (ref 4.2–5.4)
SODIUM SERPL-SCNC: 139 MMOL/L (ref 135–145)
T4 FREE SERPL-MCNC: 1.69 NG/DL (ref 0.93–1.7)
TRIGL SERPL-MCNC: 68 MG/DL (ref 0–149)
TSH SERPL DL<=0.005 MIU/L-ACNC: 0.08 UIU/ML (ref 0.38–5.33)
WBC # BLD AUTO: 8.1 K/UL (ref 4.8–10.8)

## 2024-02-13 PROCEDURE — 80053 COMPREHEN METABOLIC PANEL: CPT

## 2024-02-13 PROCEDURE — 85652 RBC SED RATE AUTOMATED: CPT

## 2024-02-13 PROCEDURE — 36415 COLL VENOUS BLD VENIPUNCTURE: CPT

## 2024-02-13 PROCEDURE — 84443 ASSAY THYROID STIM HORMONE: CPT

## 2024-02-13 PROCEDURE — 86140 C-REACTIVE PROTEIN: CPT

## 2024-02-13 PROCEDURE — 85025 COMPLETE CBC W/AUTO DIFF WBC: CPT

## 2024-02-13 PROCEDURE — 84439 ASSAY OF FREE THYROXINE: CPT

## 2024-02-13 PROCEDURE — 80061 LIPID PANEL: CPT

## 2024-02-13 PROCEDURE — 82306 VITAMIN D 25 HYDROXY: CPT

## 2024-02-16 ASSESSMENT — RHEUMATOLOGY FOLLOW-UP QUESTIONNAIRE
HEADACHES: Y
CHIEF_COMPLAINT: FOLLOW UP
SKIN PLAQUES: Y
RATE_1TO10: 1

## 2024-02-22 ENCOUNTER — OFFICE VISIT (OUTPATIENT)
Dept: RHEUMATOLOGY | Facility: MEDICAL CENTER | Age: 64
End: 2024-02-22
Attending: STUDENT IN AN ORGANIZED HEALTH CARE EDUCATION/TRAINING PROGRAM
Payer: COMMERCIAL

## 2024-02-22 VITALS
BODY MASS INDEX: 27.42 KG/M2 | DIASTOLIC BLOOD PRESSURE: 84 MMHG | SYSTOLIC BLOOD PRESSURE: 112 MMHG | TEMPERATURE: 98 F | OXYGEN SATURATION: 91 % | HEIGHT: 62 IN | HEART RATE: 100 BPM | WEIGHT: 149 LBS

## 2024-02-22 DIAGNOSIS — D84.9 IMMUNOSUPPRESSED STATUS (HCC): ICD-10-CM

## 2024-02-22 DIAGNOSIS — M15.9 OSTEOARTHRITIS INVOLVING MULTIPLE JOINTS ON BOTH SIDES OF BODY: ICD-10-CM

## 2024-02-22 DIAGNOSIS — R76.8 SEROLOGIC AUTOIMMUNITY: ICD-10-CM

## 2024-02-22 DIAGNOSIS — M05.9 SEROPOSITIVE RHEUMATOID ARTHRITIS (HCC): ICD-10-CM

## 2024-02-22 PROCEDURE — 3079F DIAST BP 80-89 MM HG: CPT | Performed by: STUDENT IN AN ORGANIZED HEALTH CARE EDUCATION/TRAINING PROGRAM

## 2024-02-22 PROCEDURE — 99214 OFFICE O/P EST MOD 30 MIN: CPT | Performed by: STUDENT IN AN ORGANIZED HEALTH CARE EDUCATION/TRAINING PROGRAM

## 2024-02-22 PROCEDURE — 99212 OFFICE O/P EST SF 10 MIN: CPT | Performed by: STUDENT IN AN ORGANIZED HEALTH CARE EDUCATION/TRAINING PROGRAM

## 2024-02-22 PROCEDURE — 3074F SYST BP LT 130 MM HG: CPT | Performed by: STUDENT IN AN ORGANIZED HEALTH CARE EDUCATION/TRAINING PROGRAM

## 2024-02-22 RX ORDER — PREDNISONE 10 MG/1
TABLET ORAL
Qty: 74 TABLET | Refills: 0 | Status: SHIPPED | OUTPATIENT
Start: 2024-02-22 | End: 2024-04-03

## 2024-02-22 ASSESSMENT — FIBROSIS 4 INDEX: FIB4 SCORE: 0.84

## 2024-02-22 NOTE — PATIENT INSTRUCTIONS
AFTER VISIT INSTRUCTIONS    Below are important information to help you navigate your healthcare needs and help us serve you safely and effectively:  If laboratory tests and/or imaging studies were ordered, remember to go get them done as instructed.  If new prescriptions and/or refills were sent, remember to go pick them up from your local pharmacy, or call the specialty pharmacy to request shipment.  Always take your prescription medications exactly as prescribed unless instructed otherwise.  Note that antirheumatic drugs and steroids are immunosuppressive which means they increase your risk of infections and have multiple potential adverse effects on various organ systems in your body, though many of them are uncommon.  It is important that you are up-to-date on age-appropriate immunizations, particularly shingles and bacterial/viral pneumonia vaccines, which you can request from me or your primary care provider.  Be sure to read the drug package inserts to learn about the potential side effects of your medications before you start taking them.  If you experience any significant drug side effects, stop taking the medication and notify me promptly, and depending on the severity of the side effects, consider going to an urgent care or emergency department for immediate attention.  If there are significant findings on your lab tests and imaging studies that warrant further action, I will notify you with explanations via [employer]hart or phone call, otherwise you can view them on [employer] and let me know if you have any questions.  Note that [employer] messages are typically read during office hours and may take 1-7 business days before a response depending on the urgency of the situation and how busy my clinic schedule is.  In general, [employer] messaging is for non-urgent matters that do not require immediate attention, so for urgent matters that cannot wait, you are advised to go to an urgent care.  You are granted WHATTt  access to my documentation of your visit and are encouraged to read my note which details my assessment and plan for your condition.  To learn more about your condition and rheumatic diseases evaluated and treated by rheumatologists, as well as gain access to many helpful resources about these diseases, visit our website: [email or web address].  To properly dispose of your unused, unwanted, or residual medications/supplies, visit the Drug Enforcement Administration website to locate your closest drop-off location: www.[place].gov/everyday-takeback-day.

## 2024-02-22 NOTE — PROGRESS NOTES
[facility] RHEUMATOLOGY  [address], [address], [place] [address]  Phone: [phone] ? Fax: [phone]  [facility]/Health-Services/Rheumatology    FOLLOW-UP VISIT NOTE      DATE OF SERVICE: 02/22/2024         Subjective     PRIMARY CARE PRACTITIONER:  Cheryl M. Demucha, A.P.R.N.  [address] [place] [address]    PATIENT IDENTIFICATION:  Diane Crowell  [address] [place] [address]  [place] [address]    YOB: 1960    MEDICAL RECORD NUMBER: [id number]          CHIEF COMPLAINT:   Chief Complaint   Patient presents with    Follow-Up     Seropositive rheumatoid arthritis (HCC)       RHEUMATOLOGIC HISTORY:  Diane Crowell is a 63 y.o. female with pertinent history notable for seropositive rheumatoid arthritis diagnosed in 3/2022, osteoarthritis of multiple joints (hands and feet), and Hashimoto's thyroiditis with hypothyroidism. She initially presented on 3/22/22, reported onset of polyarthralgia in 7/2021 preceded by hypersensitivity rash from weeding at her yard. Since then she had been treated with several one-week courses of steroids and was on prednisone 2.5 mg daily when evaluated. With each course of steroid she experienced improvement but flared soon after completing the treatment. In addition, she has been managing with meloxicam 7.5 mg daily which she had been taken for a long time. She reported ongoing pain/swelling in her hands, wrists, and feet, as well as pain in her lower back and hips, associated with morning stiffness lasting all day and improving with activity.     Pertinent treatments: Prednisone 30>20 mg tapers during flares (on/off since 3/2022-9/2023, effective), methotrexate 15-20 mg weekly (3/2022-11/2022, ineffective), Enbrel 50 mg SC weekly (1/2023-12/2023, stopped due to formation of anti-Etanercept antibody), leflunomide 20 mg daily (11/2022-4/2023, flared on Enbrel alone, so restarted 5/2023-present), Humira 40 mg SC every 2 weeks (ordered 11/22/23-present).      Pertinent positive labs: Positive RF 80, anti-CCP 81, and JORGE 1:320 homogenous/cytoplasmic patterns with negative reflex panel (in 3/2022); positive anti-TPO 51.2 and anti-.8 (in 7/2021); positive anti-Etanercept antibody 540 ng/mL (in 7/2023).    Pertinent negative labs: Negative HLA-B27 (in 10/2022), vitamin D 49<26 (in 12/2022), HBV, HCV, and QTB (in 1/2023), vitamin D, CRP, ESR, eGFR, creatinine, LFTs, and CBC (in 2/2024).     Pertinent XR imaging: Hands (in 3/2022) with typical osteoarthritis of bilateral 1st carpometacarpal joint and distal interphalangeal joint. Feet (in 3/2022) with postoperative changes of the right 5th proximal phalanx in the left 1st metatarsal head, osteoarthritis of both 1st metatarsophalangeal joint, and right accessory navicular, anatomic variant.      INTERVAL HISTORY:  Reports interval history as noted on the questionnaire below.  [facility] Established Patient History Form    2/16/2024  1:29 PM PST - Filed by Patient   MAIN REASON FOR VISIT Follow up   INTERVAL HISTORY OF ILLNESS   Date of worsening onset:    Preceding incident/ailment:    Describe/list your symptoms: Nothing today   Exacerbating factors:    Alleviating factors:    Helpful medications:    Ineffective medications:    Severity of pain (scale of 1-10): 1   Personal/emotional stressors:    Mateusz All The Areas Of Pain    REVIEW OF SYMPTOMS    General   Fevers    Chills    Night sweats    Malaise    Fatigue    Unintentional weight loss    Musculoskeletal   Joint pain    Morning stiffness duration    Morning stiffness characteristic    Joint swelling    Joint instability    Tendon pain    Muscle pain    Body aches    Dermatologic   Hair loss with bald spots    Hair shedding    Skin thickening    Skin plaques Yes   Sunlight-induced skin rash    Cold-induced color changes (white, purple, red on rewarming)    Neurologic/Psychiatric   Weakness    Spasms    Tingling    Burning    Numbness    Insomnia    Anxiety     Depression    Head/Eyes   Headaches Yes   Temple pain    Dizziness    Dry eyes    Eye pain    Eye redness    Blurry vision    Vision loss    Ears/Nose   Ear pain    Ringing in ears    Vertigo    Hearing loss    Nasal ulcers    Nosebleeds    Sinus pain    Nasal congestion    Snoring    Mouth/Throat   Oral ulcers    Bleeding gums    Dry mouth    Cavities    Sore throat    Sticking in throat    Difficulty speaking    Neck/Lymphatics   Thyroid pain    Thyroid swelling    Lymph node swelling    Cardiac/Respiratory   Chest pain with breathing    Dry cough    Cough with bloody phlegm    Shortness of breath    Fast heartbeats    Irregular heartbeats    Gastrointestinal   Nausea    Vomiting    Difficulty swallowing    Heartburn    Abdominal pain    Bloody stool    Mucus stool    Genitourinary   Pelvic pain    Genital ulcers    Abnormal discharge    Burning urination    Frothy urine    Blood in urine        REVIEW OF SYSTEMS:  Except as noted in the history above, relevant review of systems with emphasis on autoimmune rheumatic diseases was otherwise negative.      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) 03/22/2022    Swelling of both hands 03/08/2022    Cellulitis of right upper extremity 01/26/2022    Psoriasiform eruption 12/07/2021    Hashimoto's thyroiditis 08/02/2021    Vitamin D deficiency 08/02/2021    Postmenopausal 08/02/2021    Tobacco dependence due to cigarettes 08/02/2021       PAST MEDICAL HISTORY:  Past Medical History:    Diagnosis Date    Seropositive rheumatoid arthritis (HCC) 3/22/2022    Thyroid disease     Tobacco dependence due to cigarettes        PAST SURGICAL HISTORY:  Past Surgical History:   Procedure Laterality Date    RI INJ LUMBAR/SACRAL,W/ IMAGING N/A 3/21/2023    Procedure: Caudal epidural steroid injection with catheter. PATIENT NEEDS TO HOLD NSAIDS FOR 5 DAYS PRIOR TO INJECTION;  Surgeon: Shantelle Winkler M.D.;  [facility];  Service: Pain Management       MEDICATIONS:  Current Outpatient Medications   Medication Sig    predniSONE (DELTASONE) 10 MG Tab Take 3 Tablets by mouth every day for 7 days, THEN 2.5 Tablets every day for 7 days, THEN 2 Tablets every day for 7 days, THEN 1.5 Tablets every day for 7 days, THEN 1 Tablet every day for 7 days, THEN 0.5 Tablets every day for 7 days. Take in the morning with food.    budesonide-formoterol (SYMBICORT) 80-4.5 MCG/ACT Aerosol Inhale 2 Puffs 2 times a day.    benzonatate (TESSALON) 200 MG capsule Take 1 Capsule by mouth 3 times a day as needed for Cough.    leflunomide (ARAVA) 20 MG Tab Take 1 Tablet by mouth every day.    levothyroxine (SYNTHROID) 112 MCG Tab Take 1 Tablet by mouth every morning on an empty stomach. Take 1 tablet by mouth every morning on an empty stomach    albuterol 108 (90 Base) MCG/ACT Aero Soln inhalation aerosol Inhale 2 Puffs every four hours as needed for Shortness of Breath.    estradiol-norethindrone (ACTIVELLA) 1-0.5 MG per tablet Take 1 Tablet by mouth every day.    VITAMIN D PO Take  by mouth.    Calcium Carbonate (CALCIUM 500 PO) Take  by mouth.    HADLIMA PUSHTOUCH 40 MG/0.4ML Solution Auto-injector INJECT 40 MG UNDER THE SKIN EVERY 14 DAYS.       ALLERGIES:   Allergies   Allergen Reactions    Codeine     Pcn [Penicillins]        IMMUNIZATIONS:  Immunization History   Administered Date(s) Administered    Influenza Seasonal Injectable - Historical Data 12/18/2012    Influenza Vac Subunit Quad Inj (Pf)  "10/15/2018    Influenza Vaccine Quad Inj (Pf) 10/07/2017, 10/18/2019, 10/15/2022    Influenza Vaccine Quad Recombinant 10/06/2020, 11/18/2021    MODERNA BIVALENT BOOSTER SARS-COV-2 VACCINE (6+) 02/03/2023    MODERNA SARS-COV-2 VACCINE (12+) 06/04/2021, 07/02/2021    Zoster Vaccine Live (ZVL) (Zostavax) - HISTORICAL DATA 09/20/2016    Zoster Vaccine Recombinant (RZV) (SHINGRIX) 10/15/2022, 01/26/2023       SOCIAL HISTORY:   Social History     Socioeconomic History    Marital status:    Tobacco Use    Smoking status: Every Day     Current packs/day: 1.00     Average packs/day: 1 pack/day for 23.0 years (23.0 ttl pk-yrs)     Types: Cigarettes     Passive exposure: Current    Smokeless tobacco: Never   Vaping Use    Vaping Use: Never used   Substance and Sexual Activity    Alcohol use: Yes     Comment: occ    Drug use: Never    Sexual activity: Yes     Partners: Male     Birth control/protection: Post-Menopausal       FAMILY HISTORY:  Family History   Problem Relation Age of Onset    Other Father         ALS    Lung Disease Paternal Grandmother             Objective     Vital Signs: /84 (BP Location: Left arm, Patient Position: Sitting, BP Cuff Size: Adult)   Pulse 100   Temp 36.7 °C (98 °F) (Temporal)   Ht 1.575 m (5' 2\")   Wt 67.6 kg (149 lb)   SpO2 91% Body mass index is 27.25 kg/m².    General: Appears well and comfortable  Eyes: No scleral or conjunctival lesions  ENT: No apparent oral or nasal lesions  Head/Neck: No apparent scalp or neck lesions  Cardiovascular: Regular rate and rhythm  Respiratory: Breathing quiet and unlabored  Gastrointestinal: No apparent organomegaly or abdominal masses  Integumentary: No significant cutaneous lesions or dyspigmentation  Musculoskeletal: Mild to moderate tenderness of a reducible soft tissue swelling over the area proximal to the right wrist palmar/radial aspect; left hand MCP joints with residual periarticular soft tissue swelling of synovial " hypertrophy  Neurologic: No focal sensory or motor deficits  Psychiatric: Mood and affect appropriate      LABORATORY RESULTS REVIEWED AND INTERPRETED BY ME:  Lab Results   Component Value Date/Time    CREACTPROT 0.63 02/13/2024 09:21 AM    SEDRATEWES 8 02/13/2024 09:21 AM     Lab Results   Component Value Date/Time    RHEUMFACTN 80 (H) 03/08/2022 02:32 PM    CCPANTIBODY 81 (H) 03/23/2022 09:46 AM    IOHE99AXQW Negative 10/26/2022 03:55 PM     Lab Results   Component Value Date/Time    ANTINUCAB Detected (A) 03/23/2022 09:46 AM     Lab Results   Component Value Date/Time    ANTIDNADS 15 03/23/2022 09:46 AM    SMITHAB 3 03/23/2022 09:46 AM    RNPAB 7 03/23/2022 09:46 AM    EHTRELP29 9 03/23/2022 09:46 AM    SSA60 3 03/23/2022 09:46 AM    SSA52 1 03/23/2022 09:46 AM    ANTISSBSJ 0 03/23/2022 09:46 AM    JO1AB 1 03/23/2022 09:46 AM     Lab Results   Component Value Date/Time    MICROSOMALA 51.2 (H) 07/24/2021 09:09 AM    ANTITHYROGL 286.8 (H) 07/24/2021 09:09 AM    TSHULTRASEN 0.080 (L) 02/13/2024 09:20 AM    FREET4 1.69 02/13/2024 09:20 AM     Lab Results   Component Value Date/Time    25HYDROXY 47 02/13/2024 09:20 AM     Lab Results   Component Value Date/Time    WBC 8.1 02/13/2024 09:21 AM    RBC 4.97 02/13/2024 09:21 AM    HEMOGLOBIN 15.8 02/13/2024 09:21 AM    HEMATOCRIT 46.7 02/13/2024 09:21 AM    MCV 94.0 02/13/2024 09:21 AM    MCH 31.8 02/13/2024 09:21 AM    MCHC 33.8 02/13/2024 09:21 AM    RDW 45.7 02/13/2024 09:21 AM    PLATELETCT 244 02/13/2024 09:21 AM    MPV 11.2 02/13/2024 09:21 AM    NEUTS 4.56 02/13/2024 09:21 AM    LYMPHOCYTES 26.10 02/13/2024 09:21 AM    MONOCYTES 10.80 02/13/2024 09:21 AM    EOSINOPHILS 5.40 02/13/2024 09:21 AM    BASOPHILS 1.10 02/13/2024 09:21 AM     Lab Results   Component Value Date/Time    ASTSGOT 14 02/13/2024 09:21 AM    ALTSGPT 18 02/13/2024 09:21 AM    ALKPHOSPHAT 59 02/13/2024 09:21 AM    TBILIRUBIN 0.4 02/13/2024 09:21 AM    TOTPROTEIN 6.6 02/13/2024 09:21 AM    ALBUMIN  3.9 02/13/2024 09:21 AM     Lab Results   Component Value Date/Time    SODIUM 139 02/13/2024 09:21 AM    POTASSIUM 4.2 02/13/2024 09:21 AM    CHLORIDE 106 02/13/2024 09:21 AM    CO2 22 02/13/2024 09:21 AM    GLUCOSE 88 02/13/2024 09:21 AM    BUN 11 02/13/2024 09:21 AM    CREATININE 0.87 02/13/2024 09:21 AM    CALCIUM 8.9 02/13/2024 09:21 AM     Lab Results   Component Value Date/Time    HEPBSAG Non-Reactive 01/19/2023 02:50 PM    HEPCAB Non-Reactive 01/19/2023 02:50 PM     Lab Results   Component Value Date/Time    CHOLSTRLTOT 173 02/13/2024 09:20 AM    LDL 97 02/13/2024 09:20 AM    HDL 62 02/13/2024 09:20 AM    TRIGLYCERIDE 68 02/13/2024 09:20 AM       RADIOLOGY RESULTS REVIEWED AND INTERPRETED BY ME:  Results for orders placed in visit on 03/23/22    DX-JOINT SURVEY-FEET SINGLE VIEW    Impression  1.  No evidence for inflammatory arthritis    2.  Postoperative changes of the right 5th proximal phalanx in the left 1st metatarsal head    3.  osteoarthritis of both 1st metatarsophalangeal joint    4.  Right accessory navicular, anatomic variant    Results for orders placed in visit on 03/23/22    DX-JOINT SURVEY-HANDS SINGLE VIEW    Impression  1.  No evidence for erosive arthritis    2.  Typical osteoarthritis of bilateral 1st carpometacarpal joint and distal interphalangeal joint      All relevant laboratory and imaging results reported on this note were reviewed and interpreted by me.         Assessment & Plan     Diane Crowell is a 63 y.o. female with history as noted above whose presentation merits the following clinical impressions and recommendations:    1. Seropositive rheumatoid arthritis (HCC)  Clinically with residual disease activity on the current regimen of leflunomide and Humira, but it is still relatively early to fully assess the true clinical benefit of Humira, so no need for modification of her regimen at this time. Instead, would give another course of prednisone taper to get her through  until Humira reaches peak clinical effectiveness.  - CRP QUANTITIVE (NON-CARDIAC); Future  - Sed Rate; Future  - Continue leflunomide 20 mg daily as prescribed  - Continue Humira 40 mg SC every 2 weeks  - predniSONE (DELTASONE) 10 MG Tab; Take 3 Tablets by mouth every day for 7 days, THEN 2.5 Tablets every day for 7 days, THEN 2 Tablets every day for 7 days, THEN 1.5 Tablets every day for 7 days, THEN 1 Tablet every day for 7 days, THEN 0.5 Tablets every day for 7 days. Take in the morning with food.  Dispense: 74 Tablet; Refill: 0    2. Osteoarthritis involving multiple joints on both sides of body (hands and feet)  Considered a significant component of her overall peripheral joint pain which can be managed supportively.  - Voltaren 1% gel and Tylenol Arthritis with or without oral NSAIDs as needed  - Consider intra-articular steroid injection if that becomes necessary    3. 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  - Consider repeat testing depending on her clinical trajectory    4. Immunosuppressed status (HCC)  Presently with no history, physical, or laboratory evidence to suggest significant adverse drug effects or opportunistic infections, but need routine monitoring per guidelines.  - CBC WITH DIFFERENTIAL; Future  - Comp Metabolic Panel; Future  - Need to ascertain age-appropriate vaccines in addition to the ones listed above under immunizations      The above assessment and plan were discussed with the patient who acknowledged understanding of the plan.    FOLLOW-UP: Return in about 3 months (around 5/22/2024) for Short.         Thank you for the opportunity to participate in the care of Diane Crowell.    Josr Mayer MD, MS, FACR  Rheumatologist, [facility] ? [facility]  Center   of Clinical Medicine, Department of Internal Medicine  [facility]

## 2024-03-05 ENCOUNTER — APPOINTMENT (OUTPATIENT)
Dept: RADIOLOGY | Facility: IMAGING CENTER | Age: 64
End: 2024-03-05
Attending: FAMILY MEDICINE
Payer: COMMERCIAL

## 2024-03-05 ENCOUNTER — OFFICE VISIT (OUTPATIENT)
Dept: URGENT CARE | Facility: PHYSICIAN GROUP | Age: 64
End: 2024-03-05
Payer: COMMERCIAL

## 2024-03-05 VITALS
TEMPERATURE: 98.4 F | OXYGEN SATURATION: 94 % | WEIGHT: 149 LBS | BODY MASS INDEX: 27.42 KG/M2 | SYSTOLIC BLOOD PRESSURE: 108 MMHG | RESPIRATION RATE: 16 BRPM | HEART RATE: 102 BPM | HEIGHT: 62 IN | DIASTOLIC BLOOD PRESSURE: 66 MMHG

## 2024-03-05 DIAGNOSIS — R05.1 ACUTE COUGH: ICD-10-CM

## 2024-03-05 DIAGNOSIS — B96.89 BACTERIAL LOWER RESPIRATORY INFECTION: ICD-10-CM

## 2024-03-05 DIAGNOSIS — J01.90 ACUTE BACTERIAL SINUSITIS: ICD-10-CM

## 2024-03-05 DIAGNOSIS — R06.02 SHORTNESS OF BREATH: ICD-10-CM

## 2024-03-05 DIAGNOSIS — B96.89 ACUTE BACTERIAL SINUSITIS: ICD-10-CM

## 2024-03-05 DIAGNOSIS — J22 BACTERIAL LOWER RESPIRATORY INFECTION: ICD-10-CM

## 2024-03-05 PROCEDURE — 71046 X-RAY EXAM CHEST 2 VIEWS: CPT | Mod: TC,FY | Performed by: RADIOLOGY

## 2024-03-05 PROCEDURE — 94640 AIRWAY INHALATION TREATMENT: CPT | Performed by: FAMILY MEDICINE

## 2024-03-05 PROCEDURE — 99214 OFFICE O/P EST MOD 30 MIN: CPT | Mod: 25 | Performed by: FAMILY MEDICINE

## 2024-03-05 PROCEDURE — 3078F DIAST BP <80 MM HG: CPT | Performed by: FAMILY MEDICINE

## 2024-03-05 PROCEDURE — 3074F SYST BP LT 130 MM HG: CPT | Performed by: FAMILY MEDICINE

## 2024-03-05 RX ORDER — IPRATROPIUM BROMIDE AND ALBUTEROL SULFATE 2.5; .5 MG/3ML; MG/3ML
3 SOLUTION RESPIRATORY (INHALATION) ONCE
Status: COMPLETED | OUTPATIENT
Start: 2024-03-05 | End: 2024-03-05

## 2024-03-05 RX ORDER — DEXAMETHASONE SODIUM PHOSPHATE 10 MG/ML
10 INJECTION INTRAMUSCULAR; INTRAVENOUS ONCE
Status: COMPLETED | OUTPATIENT
Start: 2024-03-05 | End: 2024-03-05

## 2024-03-05 RX ORDER — DOXYCYCLINE HYCLATE 100 MG
100 TABLET ORAL 2 TIMES DAILY
Qty: 20 TABLET | Refills: 0 | Status: SHIPPED | OUTPATIENT
Start: 2024-03-05 | End: 2024-03-15

## 2024-03-05 RX ORDER — BENZONATATE 200 MG/1
CAPSULE ORAL
Qty: 30 CAPSULE | Refills: 0 | Status: SHIPPED | OUTPATIENT
Start: 2024-03-05 | End: 2024-03-29

## 2024-03-05 RX ORDER — ADALIMUMAB 40MG/0.4ML
KIT SUBCUTANEOUS
COMMUNITY
Start: 2024-03-02

## 2024-03-05 RX ORDER — ALBUTEROL SULFATE 90 UG/1
2 AEROSOL, METERED RESPIRATORY (INHALATION) EVERY 4 HOURS PRN
Qty: 1 EACH | Refills: 5 | Status: SHIPPED | OUTPATIENT
Start: 2024-03-05

## 2024-03-05 RX ADMIN — IPRATROPIUM BROMIDE AND ALBUTEROL SULFATE 3 ML: 2.5; .5 SOLUTION RESPIRATORY (INHALATION) at 14:34

## 2024-03-05 RX ADMIN — DEXAMETHASONE SODIUM PHOSPHATE 10 MG: 10 INJECTION INTRAMUSCULAR; INTRAVENOUS at 14:33

## 2024-03-05 ASSESSMENT — FIBROSIS 4 INDEX: FIB4 SCORE: 0.84

## 2024-03-05 NOTE — PROGRESS NOTES
Chief Complaint:    Chief Complaint   Patient presents with    Cough     X 1 week with congestion, wheezing, S.O.B..        History of Present Illness:    Symptoms persisting x 1 week, including purulent mucus from nose, voice hoarseness, cough productive of purulent mucus, and shortness of breath. Has Symbicort and Albuterol inhaler to use, has been using the Albuterol often. Currently on Prednisone for RA. OV 10/8/23 with other provider - was given Albuterol neb treatment, Dexamethasone oral in clinic, and prescribed Doxycycline and Tessalon, visit reviewed. Treatment regimen worked and was tolerated. She would like to check CXR today. She needs a refill of her Albuterol inhaler.      Past Medical History:    Past Medical History:   Diagnosis Date    Seropositive rheumatoid arthritis (HCC) 3/22/2022    Thyroid disease     Tobacco dependence due to cigarettes      Past Surgical History:    Past Surgical History:   Procedure Laterality Date    GA INJ LUMBAR/SACRAL,W/ IMAGING N/A 3/21/2023    Procedure: Caudal epidural steroid injection with catheter. PATIENT NEEDS TO HOLD NSAIDS FOR 5 DAYS PRIOR TO INJECTION;  Surgeon: Shantelle Winkler M.D.;  [facility];  Service: Pain Management     Social History:    Social History     Socioeconomic History    Marital status:      Spouse name: Not on file    Number of children: Not on file    Years of education: Not on file    Highest education level: Not on file   Occupational History    Not on file   Tobacco Use    Smoking status: Every Day     Current packs/day: 1.00     Average packs/day: 1 pack/day for 23.0 years (23.0 ttl pk-yrs)     Types: Cigarettes     Passive exposure: Current    Smokeless tobacco: Never   Vaping Use    Vaping Use: Never used   Substance and Sexual Activity    Alcohol use: Yes     Comment: occ    Drug use: Never    Sexual activity: Yes     Partners: Male     Birth control/protection: Post-Menopausal   Other Topics  Concern    Not on file   Social History Narrative    Not on file     Social Determinants of Health     Financial Resource Strain: Not on file   Food Insecurity: Not on file   Transportation Needs: Not on file   Physical Activity: Not on file   Stress: Not on file   Social Connections: Not on file   Intimate Partner Violence: Not on file   Housing Stability: Not on file     Family History:    Family History   Problem Relation Age of Onset    Other Father         ALS    Lung Disease Paternal Grandmother      Medications:    Current Outpatient Medications on File Prior to Visit   Medication Sig Dispense Refill    HUMIRA, 2 PEN, 40 MG/0.4ML Pen-injector Kit       predniSONE (DELTASONE) 10 MG Tab Take 3 Tablets by mouth every day for 7 days, THEN 2.5 Tablets every day for 7 days, THEN 2 Tablets every day for 7 days, THEN 1.5 Tablets every day for 7 days, THEN 1 Tablet every day for 7 days, THEN 0.5 Tablets every day for 7 days. Take in the morning with food. 74 Tablet 0    HADLIMA PUSHTOUCH 40 MG/0.4ML Solution Auto-injector INJECT 40 MG UNDER THE SKIN EVERY 14 DAYS. 0.8 mL 11    budesonide-formoterol (SYMBICORT) 80-4.5 MCG/ACT Aerosol Inhale 2 Puffs 2 times a day. 3 Each 3    benzonatate (TESSALON) 200 MG capsule Take 1 Capsule by mouth 3 times a day as needed for Cough. 30 Capsule 0    leflunomide (ARAVA) 20 MG Tab Take 1 Tablet by mouth every day. 90 Tablet 3    levothyroxine (SYNTHROID) 112 MCG Tab Take 1 Tablet by mouth every morning on an empty stomach. Take 1 tablet by mouth every morning on an empty stomach 90 Tablet 3    albuterol 108 (90 Base) MCG/ACT Aero Soln inhalation aerosol Inhale 2 Puffs every four hours as needed for Shortness of Breath. 1 Each 11    estradiol-norethindrone (ACTIVELLA) 1-0.5 MG per tablet Take 1 Tablet by mouth every day. 30 Tablet 1    VITAMIN D PO Take  by mouth.      Calcium Carbonate (CALCIUM 500 PO) Take  by mouth.       No current facility-administered medications on file prior to  "visit.     Allergies:    Allergies   Allergen Reactions    Codeine     Pcn [Penicillins]        Vitals:    Vitals:    03/05/24 1414   BP: 108/66   Pulse: (!) 102   Resp: 16   Temp: 36.9 °C (98.4 °F)   TempSrc: Temporal   SpO2: 94%   Weight: 67.6 kg (149 lb)   Height: 1.575 m (5' 2\")       Physical Exam:    Constitutional: Vital signs reviewed. Appears well-developed and well-nourished. Hoarse. Coughing.  Eyes: Sclera white, conjunctivae clear.  ENT: External ears normal. External auditory canals normal without discharge. TMs translucent and non-bulging. Hearing normal. Lips/teeth are normal. Oral mucosa pink and moist. Posterior pharynx: WNL.  Neck: Neck supple.   Cardiovascular: Regular rate and rhythm. No murmur.  Pulmonary/Chest: Respirations non-labored. Clear to auscultation bilaterally.  Musculoskeletal: Normal gait. No muscular atrophy or weakness.  Neurological: Alert and oriented to person, place, and time. Muscle tone normal. Coordination normal.   Skin: No rashes or lesions. Warm, dry, normal turgor.  Psychiatric: Normal mood and affect. Behavior is normal. Judgment and thought content normal.       Diagnostics:    DX-CHEST-2 VIEWS  Order: [id number]  Status: Final result       Visible to patient: Yes (not seen)       Next appt: 05/22/2024 at 01:00 PM in Rheumatology (Josr Mayer M.D.)       Dx: Shortness of breath    0 Result Notes       1 Patient Communication  Details    Reading Physician Reading Date Result Priority   Wil Montez M.D.  [phone] 3/5/2024      Narrative & Impression     3/5/2024 2:26 PM     HISTORY/REASON FOR EXAM: Shortness of Breath; Room 3. Shortness of breath, cough productive of purulent mucus x 1 week..     TECHNIQUE/EXAM DESCRIPTION AND NUMBER OF VIEWS:  Two views of the chest.     COMPARISON:  None.     FINDINGS:  Cardiomediastinal contours are normal.     No pulmonary consolidation is seen.     No pleural space process is evident.     IMPRESSION:     No " radiographic evidence of acute cardiopulmonary process     I personally reviewed the images. Images reviewed with her.      Assessment / Plan & Medical Decision Makin. Acute bacterial sinusitis  - doxycycline (VIBRAMYCIN) 100 MG Tab; Take 1 Tablet by mouth 2 times a day for 10 days.  Dispense: 20 Tablet; Refill: 0    2. Bacterial lower respiratory infection  - doxycycline (VIBRAMYCIN) 100 MG Tab; Take 1 Tablet by mouth 2 times a day for 10 days.  Dispense: 20 Tablet; Refill: 0    3. Shortness of breath  - ipratropium-albuterol (DUONEB) nebulizer solution  - dexamethasone (Decadron) injection (check route below) 10 mg  - DX-CHEST-2 VIEWS; Future  - albuterol 108 (90 Base) MCG/ACT Aero Soln inhalation aerosol; Inhale 2 Puffs every four hours as needed for Shortness of Breath.  Dispense: 1 Each; Refill: 5    4. Acute cough  - benzonatate (TESSALON) 200 MG capsule; 1 CAP BY MOUTH UP TO 3 TIMES A DAY ONLY IF NEEDED FOR COUGH.  Dispense: 30 Capsule; Refill: 0       Discussed with her DDX, management options, and risks, benefits, and alternatives to treatment plan agreed upon.    Symptoms persisting x 1 week, including purulent mucus from nose, voice hoarseness, cough productive of purulent mucus, and shortness of breath. Has Symbicort and Albuterol inhaler to use, has been using the Albuterol often. Currently on Prednisone for RA. OV 10/8/23 with other provider - was given Albuterol neb treatment, Dexamethasone oral in clinic, and prescribed Doxycycline and Tessalon, visit reviewed. Treatment regimen worked and was tolerated. She would like to check CXR today. She needs a refill of her Albuterol inhaler.    Hoarse. Coughing.    CXR: No radiographic evidence of acute cardiopulmonary process.    Agreeable to Duoneb neb treatment given in clinic. This was helpful.    Agreeable to medications given and prescribed.    Discussed expected course of duration, time for improvement, and to seek follow-up in Emergency Room,  urgent care, or with PCP if getting worse at any time or not improving within expected time frame.

## 2024-03-29 ENCOUNTER — APPOINTMENT (OUTPATIENT)
Dept: RADIOLOGY | Facility: IMAGING CENTER | Age: 64
End: 2024-03-29
Attending: PHYSICIAN ASSISTANT
Payer: COMMERCIAL

## 2024-03-29 ENCOUNTER — OFFICE VISIT (OUTPATIENT)
Dept: URGENT CARE | Facility: PHYSICIAN GROUP | Age: 64
End: 2024-03-29
Payer: COMMERCIAL

## 2024-03-29 VITALS
HEIGHT: 62 IN | BODY MASS INDEX: 28.16 KG/M2 | TEMPERATURE: 97.8 F | SYSTOLIC BLOOD PRESSURE: 122 MMHG | RESPIRATION RATE: 18 BRPM | HEART RATE: 96 BPM | WEIGHT: 153 LBS | OXYGEN SATURATION: 95 % | DIASTOLIC BLOOD PRESSURE: 82 MMHG

## 2024-03-29 DIAGNOSIS — R06.02 SOB (SHORTNESS OF BREATH): ICD-10-CM

## 2024-03-29 DIAGNOSIS — J22 LRTI (LOWER RESPIRATORY TRACT INFECTION): ICD-10-CM

## 2024-03-29 PROCEDURE — 71046 X-RAY EXAM CHEST 2 VIEWS: CPT | Mod: TC,FY | Performed by: PHYSICIAN ASSISTANT

## 2024-03-29 RX ORDER — DOXYCYCLINE HYCLATE 100 MG
100 TABLET ORAL 2 TIMES DAILY
Qty: 28 TABLET | Refills: 0 | Status: SHIPPED | OUTPATIENT
Start: 2024-03-29 | End: 2024-04-12

## 2024-03-29 RX ORDER — BENZONATATE 100 MG/1
100 CAPSULE ORAL 3 TIMES DAILY PRN
Qty: 30 CAPSULE | Refills: 0 | Status: SHIPPED | OUTPATIENT
Start: 2024-03-29

## 2024-03-29 RX ORDER — IPRATROPIUM BROMIDE AND ALBUTEROL SULFATE 2.5; .5 MG/3ML; MG/3ML
3 SOLUTION RESPIRATORY (INHALATION) ONCE
Status: COMPLETED | OUTPATIENT
Start: 2024-03-29 | End: 2024-03-29

## 2024-03-29 RX ADMIN — IPRATROPIUM BROMIDE AND ALBUTEROL SULFATE 3 ML: 2.5; .5 SOLUTION RESPIRATORY (INHALATION) at 17:26

## 2024-03-29 ASSESSMENT — ENCOUNTER SYMPTOMS
RHINORRHEA: 0
HEADACHES: 0
SINUS PAIN: 0
WHEEZING: 1
MYALGIAS: 0
DIZZINESS: 0
SORE THROAT: 0
COUGH: 1
FEVER: 0
SPUTUM PRODUCTION: 1
GASTROINTESTINAL NEGATIVE: 1
CHILLS: 0

## 2024-03-29 ASSESSMENT — COPD QUESTIONNAIRES: COPD: 1

## 2024-03-29 ASSESSMENT — FIBROSIS 4 INDEX: FIB4 SCORE: 0.84

## 2024-03-29 NOTE — PROGRESS NOTES
Subjective     Diane Crowell is a very pleasant 63 y.o. female who presents with Follow-Up and Cough (Loss of voice as well. Can't lay down to sleep due to couging )            Cough  This is a new problem. The current episode started 1 to 4 weeks ago. The problem has been gradually worsening. The problem occurs every few minutes. The cough is Productive of sputum. Associated symptoms include wheezing. Pertinent negatives include no chest pain, chills, ear pain, fever, headaches, myalgias, nasal congestion, postnasal drip, rhinorrhea or sore throat. Risk factors for lung disease include smoking/tobacco exposure. She has tried OTC cough suppressant and steroid inhaler for the symptoms. The treatment provided mild relief. Her past medical history is significant for bronchitis and COPD. There is no history of asthma, emphysema or pneumonia.   Patient with rheumatoid arthritis.  She is on Humira and Arava.  She is currently on the final stage of a several week prednisone burst with a taper.        PMH:  has a past medical history of Seropositive rheumatoid arthritis (HCC) (3/22/2022), Thyroid disease, and Tobacco dependence due to cigarettes.    She has no past medical history of Anemia, Anxiety, Arrhythmia, Asthma, Blood transfusion without reported diagnosis, Cancer ([facility]), Clotting disorder (HCC), COPD (chronic obstructive pulmonary disease) ([facility]), Depression, Diabetes (HCC), GERD (gastroesophageal reflux disease), Glaucoma, Heart attack (HCC), Heart murmur, HIV (human immunodeficiency virus infection) ([facility]), Hyperlipidemia, Hypertension, Kidney disease, Migraine, Pulmonary emphysema (HCC), Seizure (HCC), Stroke ([facility]), or Tuberculosis.  MEDS:   Current Outpatient Medications:     doxycycline (VIBRAMYCIN) 100 MG Tab, Take 1 Tablet by mouth 2 times a day for 14 days., Disp: 28 Tablet, Rfl: 0    benzonatate (TESSALON) 100 MG Cap, Take 1 Capsule by mouth 3 times a day as needed for Cough., Disp: 30 Capsule, Rfl:  0    HUMIRA, 2 PEN, 40 MG/0.4ML Pen-injector Kit, , Disp: , Rfl:     albuterol 108 (90 Base) MCG/ACT Aero Soln inhalation aerosol, Inhale 2 Puffs every four hours as needed for Shortness of Breath., Disp: 1 Each, Rfl: 5    HADLIMA PUSHTOUCH 40 MG/0.4ML Solution Auto-injector, INJECT 40 MG UNDER THE SKIN EVERY 14 DAYS., Disp: 0.8 mL, Rfl: 11    budesonide-formoterol (SYMBICORT) 80-4.5 MCG/ACT Aerosol, Inhale 2 Puffs 2 times a day., Disp: 3 Each, Rfl: 3    leflunomide (ARAVA) 20 MG Tab, Take 1 Tablet by mouth every day., Disp: 90 Tablet, Rfl: 3    levothyroxine (SYNTHROID) 112 MCG Tab, Take 1 Tablet by mouth every morning on an empty stomach. Take 1 tablet by mouth every morning on an empty stomach, Disp: 90 Tablet, Rfl: 3    estradiol-norethindrone (ACTIVELLA) 1-0.5 MG per tablet, Take 1 Tablet by mouth every day., Disp: 30 Tablet, Rfl: 1    VITAMIN D PO, Take  by mouth., Disp: , Rfl:     Calcium Carbonate (CALCIUM 500 PO), Take  by mouth., Disp: , Rfl:     predniSONE (DELTASONE) 10 MG Tab, Take 3 Tablets by mouth every day for 7 days, THEN 2.5 Tablets every day for 7 days, THEN 2 Tablets every day for 7 days, THEN 1.5 Tablets every day for 7 days, THEN 1 Tablet every day for 7 days, THEN 0.5 Tablets every day for 7 days. Take in the morning with food. (Patient not taking: Reported on 3/29/2024), Disp: 74 Tablet, Rfl: 0    Current Facility-Administered Medications:     ipratropium-albuterol (DUONEB) nebulizer solution, 3 mL, Nebulization, Once, ASHLEY HodgesAMigue-YVES  ALLERGIES:   Allergies   Allergen Reactions    Codeine     Pcn [Penicillins]      SURGHX:   Past Surgical History:   Procedure Laterality Date    ND INJ LUMBAR/SACRAL,W/ IMAGING N/A 3/21/2023    Procedure: Caudal epidural steroid injection with catheter. PATIENT NEEDS TO HOLD NSAIDS FOR 5 DAYS PRIOR TO INJECTION;  Surgeon: Shantelle Winkler M.D.;  Location[facility];  Service: Pain Management     SOCHX:  reports that she has been  "smoking cigarettes. She has a 23 pack-year smoking history. She has been exposed to tobacco smoke. She has never used smokeless tobacco. She reports current alcohol use. She reports that she does not use drugs.  FH: family history includes Lung Disease in her paternal grandmother; Other in her father.      Review of Systems   Constitutional:  Negative for chills and fever.   HENT:  Negative for congestion, ear pain, postnasal drip, rhinorrhea, sinus pain and sore throat.    Respiratory:  Positive for cough, sputum production and wheezing.    Cardiovascular:  Negative for chest pain.   Gastrointestinal: Negative.    Musculoskeletal:  Negative for myalgias.   Neurological:  Negative for dizziness and headaches.       Medications, Allergies, and current problem list reviewed today in Epic           Objective     /82   Pulse 96   Temp 36.6 °C (97.8 °F) (Temporal)   Resp 18   Ht 1.575 m (5' 2\")   Wt 69.4 kg (153 lb)   SpO2 95%   BMI 27.98 kg/m²      Physical Exam  Vitals and nursing note reviewed.   Constitutional:       General: She is not in acute distress.     Appearance: Normal appearance. She is well-developed. She is not ill-appearing, toxic-appearing or diaphoretic.   HENT:      Head: Normocephalic and atraumatic.      Right Ear: Tympanic membrane, ear canal and external ear normal.      Left Ear: Tympanic membrane, ear canal and external ear normal.      Nose: Nose normal. No congestion or rhinorrhea.      Mouth/Throat:      Mouth: Mucous membranes are moist.      Pharynx: No oropharyngeal exudate or posterior oropharyngeal erythema.   Eyes:      General:         Right eye: No discharge.         Left eye: No discharge.      Conjunctiva/sclera: Conjunctivae normal.   Cardiovascular:      Rate and Rhythm: Normal rate and regular rhythm.      Pulses: Normal pulses.      Heart sounds: Normal heart sounds.   Pulmonary:      Effort: Pulmonary effort is normal. No respiratory distress.      Breath sounds: " No stridor or decreased air movement. Decreased breath sounds and wheezing present. No rhonchi or rales.      Comments: Painful productive cough with bronchospasm.  Musculoskeletal:      Cervical back: Normal range of motion and neck supple.      Right lower leg: No edema.      Left lower leg: No edema.   Lymphadenopathy:      Cervical: No cervical adenopathy.   Skin:     General: Skin is warm and dry.   Neurological:      General: No focal deficit present.      Mental Status: She is alert and oriented to person, place, and time. Mental status is at baseline.   Psychiatric:         Mood and Affect: Mood normal.         Behavior: Behavior normal.         Thought Content: Thought content normal.         Judgment: Judgment normal.                             Assessment & Plan     This is a very pleasant 63-year-old female presenting with over 4 weeks of cough, congestion, shortness of breath and wheezing.  Initially with fever chills and bodyaches but those have resolved.  Was evaluated in the urgent care earlier this month and treated for sinusitis with doxycycline.  She reports significant improvement in her URI symptoms.  Initially her cough and shortness of breath improved however over the last few weeks it has returned.  She denies chest pain, palpitations, leg swelling or calf tenderness.  Patient with history of COPD, tobacco use and bronchitis.  No history of DVT/PE.  Eating and drinking normal without vomiting or diarrhea.  Initially pO2 90% on room air and vital signs otherwise normal.  She had decreased breath sounds with productive bronchial cough and bronchospasm with expiratory wheezing.  No rhonchi, rales or stridor.  Chest x-ray negative for acute cardiopulmonary pathology.  DuoNeb treatment given in clinic.  Posttreatment auscultation shows significant improvement in breath sounds and her pO2 increased to 95% on room air.  We will provide a prolonged course of oral antibiotic as patient does have  concurrent immunocompromise.  She will continue her inhalers and use Symbicort more regularly as prescribed.  OTC meds and conservative measures.  We will forego oral corticosteroid if she is on the final stage of a long-term prednisone burst with a taper.  ER and red flag symptoms discussed at length.    1. SOB (shortness of breath)  DX-CHEST-2 VIEWS    ipratropium-albuterol (DUONEB) nebulizer solution    benzonatate (TESSALON) 100 MG Cap      2. LRTI (lower respiratory tract infection)  doxycycline (VIBRAMYCIN) 100 MG Tab    benzonatate (TESSALON) 100 MG Cap          I personally reviewed prior external notes and test results pertinent to today's visit. Return to clinic or go to ED if symptoms worsen or persist. Red flag symptoms and indications for ED discussed at length. Patient/Parent/Guardian voices understanding.  AVS with post-visit instructions provided or given verbally.  Follow-up with your primary care provider in 3-5 days. All side effects and potential interactions of prescribed medication discussed including allergic response, GI upset, tendon injury, rash, sedation, OCP effectiveness, etc.    Please note that this dictation was created using voice recognition software. I have made every reasonable attempt to correct obvious errors, but I expect that there are errors of grammar and possibly content that I did not discover before finalizing the note.

## 2024-05-11 DIAGNOSIS — E03.9 HYPOTHYROIDISM, UNSPECIFIED TYPE: ICD-10-CM

## 2024-05-14 RX ORDER — LEVOTHYROXINE SODIUM 112 UG/1
112 TABLET ORAL
Qty: 90 TABLET | Refills: 3 | Status: SHIPPED | OUTPATIENT
Start: 2024-05-14

## 2024-05-14 NOTE — TELEPHONE ENCOUNTER
Received request via: Patient    Was the patient seen in the last year in this department? Yes    Does the patient have an active prescription (recently filled or refills available) for medication(s) requested? No    Pharmacy Name: [facility]   Last OV 01 04 2024    Does the patient have correction Plus and need 100 day supply (blood pressure, diabetes and cholesterol meds only)? Patient does not have SCP

## 2024-05-15 ENCOUNTER — APPOINTMENT (OUTPATIENT)
Dept: LAB | Facility: MEDICAL CENTER | Age: 64
End: 2024-05-15
Payer: COMMERCIAL

## 2024-05-16 ENCOUNTER — HOSPITAL ENCOUNTER (OUTPATIENT)
Dept: LAB | Facility: MEDICAL CENTER | Age: 64
End: 2024-05-16
Attending: STUDENT IN AN ORGANIZED HEALTH CARE EDUCATION/TRAINING PROGRAM
Payer: COMMERCIAL

## 2024-05-16 DIAGNOSIS — D84.9 IMMUNOSUPPRESSED STATUS (HCC): ICD-10-CM

## 2024-05-16 DIAGNOSIS — M05.9 SEROPOSITIVE RHEUMATOID ARTHRITIS (HCC): ICD-10-CM

## 2024-05-16 LAB
ALBUMIN SERPL BCP-MCNC: 3.6 G/DL (ref 3.2–4.9)
ALBUMIN/GLOB SERPL: 1.2 G/DL
ALP SERPL-CCNC: 63 U/L (ref 30–99)
ALT SERPL-CCNC: 14 U/L (ref 2–50)
ANION GAP SERPL CALC-SCNC: 10 MMOL/L (ref 7–16)
AST SERPL-CCNC: 19 U/L (ref 12–45)
BASOPHILS # BLD AUTO: 1 % (ref 0–1.8)
BASOPHILS # BLD: 0.1 K/UL (ref 0–0.12)
BILIRUB SERPL-MCNC: 0.3 MG/DL (ref 0.1–1.5)
BUN SERPL-MCNC: 14 MG/DL (ref 8–22)
CALCIUM ALBUM COR SERPL-MCNC: 9.4 MG/DL (ref 8.5–10.5)
CALCIUM SERPL-MCNC: 9.1 MG/DL (ref 8.5–10.5)
CHLORIDE SERPL-SCNC: 108 MMOL/L (ref 96–112)
CO2 SERPL-SCNC: 22 MMOL/L (ref 20–33)
CREAT SERPL-MCNC: 0.74 MG/DL (ref 0.5–1.4)
CRP SERPL HS-MCNC: 0.96 MG/DL (ref 0–0.75)
EOSINOPHIL # BLD AUTO: 0.56 K/UL (ref 0–0.51)
EOSINOPHIL NFR BLD: 5.8 % (ref 0–6.9)
ERYTHROCYTE [DISTWIDTH] IN BLOOD BY AUTOMATED COUNT: 49.3 FL (ref 35.9–50)
ERYTHROCYTE [SEDIMENTATION RATE] IN BLOOD BY WESTERGREN METHOD: 18 MM/HOUR (ref 0–25)
GFR SERPLBLD CREATININE-BSD FMLA CKD-EPI: 91 ML/MIN/1.73 M 2
GLOBULIN SER CALC-MCNC: 2.9 G/DL (ref 1.9–3.5)
GLUCOSE SERPL-MCNC: 101 MG/DL (ref 65–99)
HCT VFR BLD AUTO: 43.9 % (ref 37–47)
HGB BLD-MCNC: 14.3 G/DL (ref 12–16)
IMM GRANULOCYTES # BLD AUTO: 0.02 K/UL (ref 0–0.11)
IMM GRANULOCYTES NFR BLD AUTO: 0.2 % (ref 0–0.9)
LYMPHOCYTES # BLD AUTO: 2.39 K/UL (ref 1–4.8)
LYMPHOCYTES NFR BLD: 24.8 % (ref 22–41)
MCH RBC QN AUTO: 30.8 PG (ref 27–33)
MCHC RBC AUTO-ENTMCNC: 32.6 G/DL (ref 32.2–35.5)
MCV RBC AUTO: 94.4 FL (ref 81.4–97.8)
MONOCYTES # BLD AUTO: 0.76 K/UL (ref 0–0.85)
MONOCYTES NFR BLD AUTO: 7.9 % (ref 0–13.4)
NEUTROPHILS # BLD AUTO: 5.82 K/UL (ref 1.82–7.42)
NEUTROPHILS NFR BLD: 60.3 % (ref 44–72)
NRBC # BLD AUTO: 0 K/UL
NRBC BLD-RTO: 0 /100 WBC (ref 0–0.2)
PLATELET # BLD AUTO: 243 K/UL (ref 164–446)
PMV BLD AUTO: 11.1 FL (ref 9–12.9)
POTASSIUM SERPL-SCNC: 4.2 MMOL/L (ref 3.6–5.5)
PROT SERPL-MCNC: 6.5 G/DL (ref 6–8.2)
RBC # BLD AUTO: 4.65 M/UL (ref 4.2–5.4)
SODIUM SERPL-SCNC: 140 MMOL/L (ref 135–145)
WBC # BLD AUTO: 9.7 K/UL (ref 4.8–10.8)

## 2024-05-22 ENCOUNTER — OFFICE VISIT (OUTPATIENT)
Dept: RHEUMATOLOGY | Facility: MEDICAL CENTER | Age: 64
End: 2024-05-22
Attending: STUDENT IN AN ORGANIZED HEALTH CARE EDUCATION/TRAINING PROGRAM
Payer: COMMERCIAL

## 2024-05-22 VITALS
BODY MASS INDEX: 27.05 KG/M2 | DIASTOLIC BLOOD PRESSURE: 78 MMHG | OXYGEN SATURATION: 93 % | HEIGHT: 62 IN | HEART RATE: 112 BPM | WEIGHT: 147 LBS | TEMPERATURE: 97.8 F | SYSTOLIC BLOOD PRESSURE: 120 MMHG

## 2024-05-22 DIAGNOSIS — M15.9 OSTEOARTHRITIS INVOLVING MULTIPLE JOINTS ON BOTH SIDES OF BODY: ICD-10-CM

## 2024-05-22 DIAGNOSIS — D84.9 IMMUNOSUPPRESSED STATUS (HCC): ICD-10-CM

## 2024-05-22 DIAGNOSIS — R76.8 SEROLOGIC AUTOIMMUNITY: ICD-10-CM

## 2024-05-22 DIAGNOSIS — M05.9 SEROPOSITIVE RHEUMATOID ARTHRITIS (HCC): ICD-10-CM

## 2024-05-22 PROCEDURE — 99215 OFFICE O/P EST HI 40 MIN: CPT | Performed by: STUDENT IN AN ORGANIZED HEALTH CARE EDUCATION/TRAINING PROGRAM

## 2024-05-22 PROCEDURE — 3078F DIAST BP <80 MM HG: CPT | Performed by: STUDENT IN AN ORGANIZED HEALTH CARE EDUCATION/TRAINING PROGRAM

## 2024-05-22 PROCEDURE — 3074F SYST BP LT 130 MM HG: CPT | Performed by: STUDENT IN AN ORGANIZED HEALTH CARE EDUCATION/TRAINING PROGRAM

## 2024-05-22 RX ORDER — PREDNISONE 10 MG/1
TABLET ORAL
Qty: 74 TABLET | Refills: 0 | Status: SHIPPED | OUTPATIENT
Start: 2024-05-22 | End: 2024-07-02

## 2024-05-22 ASSESSMENT — FIBROSIS 4 INDEX: FIB4 SCORE: 1.3

## 2024-05-22 NOTE — PROGRESS NOTES
[facility] RHEUMATOLOGY  [address], [address], [place] [address]  Phone: [phone] ? Fax: [phone]  [facility]/Health-Services/Rheumatology    FOLLOW-UP VISIT NOTE      DATE OF SERVICE: 05/22/2024         Subjective     PRIMARY CARE PRACTITIONER:  Cheryl M. Demucha, A.P.R.N.  [address] [place] [address]    PATIENT IDENTIFICATION:  Diane Crowell  [address] [place] [address]  [place] [address]    YOB: 1960    MEDICAL RECORD NUMBER: [id number]          CHIEF COMPLAINT:   Chief Complaint   Patient presents with    Follow-Up     Seropositive rheumatoid arthritis (HCC)       RHEUMATOLOGIC HISTORY:  Diane Crowell is a 63 y.o. female with pertinent history notable for seropositive rheumatoid arthritis diagnosed in 3/2022, osteoarthritis of multiple joints (hands and feet), and Hashimoto's thyroiditis with hypothyroidism. She initially presented on 3/22/22, reported onset of polyarthralgia in 7/2021 preceded by hypersensitivity rash from weeding at her yard. Since then she had been treated with several one-week courses of steroids and was on prednisone 2.5 mg daily when evaluated. With each course of steroid she experienced improvement but flared soon after completing the treatment. In addition, she has been managing with meloxicam 7.5 mg daily which she had been taken for a long time. She reported ongoing pain/swelling in her hands, wrists, and feet, as well as pain in her lower back and hips, associated with morning stiffness lasting all day and improving with activity.     Pertinent treatments: Prednisone 30>20 mg tapers during flares (on/off 3/2022-present, effective), methotrexate 15-20 mg weekly (3/2022-11/2022, ineffective), Enbrel 50 mg SC weekly (1/2023-12/2023, stopped due to formation of anti-Etanercept antibody), leflunomide 20 mg daily (11/2022-4/2023, flared on Enbrel alone, so restarted 5/2023-present), Humira>Hadlima 40 mg SC every 2 weeks (ordered 11/22/23-present).      Pertinent positive labs: Positive RF 80, anti-CCP 81, and JORGE 1:320 homogenous/cytoplasmic patterns with negative reflex panel (in 3/2022); positive anti-TPO 51.2 and anti-.8 (in 7/2021); positive anti-Etanercept antibody 540 ng/mL (in 7/2023); elevated CRP 0.96 with normal ESR 18 (in 5/2024).    Pertinent negative labs: Negative HLA-B27 (in 10/2022), vitamin D of 49<26 (in 12/2022), HBV, HCV, and QTB (in 1/2023), vitamin D (in 2/2024), CBC, eGFR, creatinine, and LFTs (in 5/2024).     Pertinent XR imaging: Hands (in 3/2022) with typical osteoarthritis of bilateral 1st carpometacarpal joint and distal interphalangeal joint. Feet (in 3/2022) with postoperative changes of the right 5th proximal phalanx in the left 1st metatarsal head, osteoarthritis of both 1st metatarsophalangeal joint, and right accessory navicular, anatomic variant.      INTERVAL HISTORY:  Reports interval history as noted on the questionnaire below or scanned under media tab.  Had two urgent care visits in 2/2024 for productive cough with SOB presumed to be acute bronchitis treated with 10-day and 14-day courses of antibiotics, respectively.  Notably increasing pain/swelling of her hands (MCP joints) and wrists over the past 1 week.  Having issues with Hadlima injection as it leaves a hold on her thigh with bleeding and subsequent scabbing which is something that never happened with her past injections of Enbrel and Humira.    REVIEW OF SYSTEMS:  Except as noted in the history above, relevant review of systems with emphasis on autoimmune rheumatic diseases was otherwise negative.      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) 03/22/2022    Swelling of both hands 03/08/2022    Cellulitis of right upper extremity 01/26/2022    Psoriasiform eruption 12/07/2021    Hashimoto's thyroiditis 08/02/2021    Vitamin D deficiency 08/02/2021    Postmenopausal 08/02/2021    Tobacco dependence due to cigarettes 08/02/2021       PAST MEDICAL HISTORY:  Past Medical History:   Diagnosis Date    Seropositive rheumatoid arthritis (HCC) 3/22/2022    Thyroid disease     Tobacco dependence due to cigarettes        PAST SURGICAL HISTORY:  Past Surgical History:   Procedure Laterality Date    NM INJ LUMBAR/SACRAL,W/ IMAGING N/A 3/21/2023    Procedure: Caudal epidural steroid injection with catheter. PATIENT NEEDS TO HOLD NSAIDS FOR 5 DAYS PRIOR TO INJECTION;  Surgeon: Shantelle Winkler M.D.;  [facility];  Service: Pain Management       SOCIAL HISTORY:  Social History     Socioeconomic History    Marital status:      Spouse name: Not on file    Number of children: Not on file    Years of education: Not on file    Highest education level: Not on file   Occupational History    Not on file   Tobacco Use    Smoking status: Every Day     Current packs/day: 1.00     Average packs/day: 1 pack/day for 23.0 years (23.0 ttl pk-yrs)     Types: Cigarettes     Passive exposure: Current    Smokeless tobacco: Never   Vaping Use    Vaping status: Never Used   Substance and Sexual Activity    Alcohol use: Yes     Comment: occ    Drug use: Never    Sexual activity: Yes     Partners: Male     Birth control/protection: Post-Menopausal   Other Topics Concern    Not on file   Social History Narrative    Not on file     Social Determinants of Health     Financial Resource Strain: Not on file   Food Insecurity: Not on file   Transportation Needs: Not on file   Physical  Activity: Not on file   Stress: Not on file   Social Connections: Not on file   Intimate Partner Violence: Not on file   Housing Stability: Not on file       FAMILY HISTORY:  Family History   Problem Relation Age of Onset    Other Father         ALS    Lung Disease Paternal Grandmother        MEDICATIONS:  Current Outpatient Medications   Medication Sig    predniSONE (DELTASONE) 10 MG Tab Take 3 Tablets by mouth every day for 7 days, THEN 2.5 Tablets every day for 7 days, THEN 2 Tablets every day for 7 days, THEN 1.5 Tablets every day for 7 days, THEN 1 Tablet every day for 7 days, THEN 0.5 Tablets every day for 7 days. Take in the morning with food.    levothyroxine (SYNTHROID) 112 MCG Tab Take 1 Tablet by mouth every morning on an empty stomach. Take 1 tablet by mouth every morning on an empty stomach    benzonatate (TESSALON) 100 MG Cap Take 1 Capsule by mouth 3 times a day as needed for Cough.    albuterol 108 (90 Base) MCG/ACT Aero Soln inhalation aerosol Inhale 2 Puffs every four hours as needed for Shortness of Breath.    HADLIMA PUSHTOUCH 40 MG/0.4ML Solution Auto-injector INJECT 40 MG UNDER THE SKIN EVERY 14 DAYS.    budesonide-formoterol (SYMBICORT) 80-4.5 MCG/ACT Aerosol Inhale 2 Puffs 2 times a day.    leflunomide (ARAVA) 20 MG Tab Take 1 Tablet by mouth every day.    estradiol-norethindrone (ACTIVELLA) 1-0.5 MG per tablet Take 1 Tablet by mouth every day.    VITAMIN D PO Take  by mouth.    Calcium Carbonate (CALCIUM 500 PO) Take  by mouth.       ALLERGIES:   Allergies   Allergen Reactions    Codeine     Pcn [Penicillins]        IMMUNIZATIONS:  Immunization History   Administered Date(s) Administered    Influenza Seasonal Injectable - Historical Data 12/18/2012    Influenza Vac Subunit Quad Inj (Pf) 10/15/2018    Influenza Vaccine Quad Inj (Pf) 10/07/2017, 10/18/2019, 10/15/2022    Influenza Vaccine Quad Recombinant 10/06/2020, 11/18/2021    MODERNA SARS-COV-2 VACCINE (12+) 06/04/2021, 07/02/2021     "Zoster Vaccine Live (ZVL) (Zostavax) - HISTORICAL DATA 09/20/2016    Zoster Vaccine Recombinant (RZV) (SHINGRIX) 10/15/2022, 01/26/2023            Objective     Vital Signs: /78 (BP Location: Left arm, Patient Position: Sitting, BP Cuff Size: Adult)   Pulse (!) 112   Temp 36.6 °C (97.8 °F) (Temporal)   Ht 1.575 m (5' 2\")   Wt 66.7 kg (147 lb)   SpO2 93% Body mass index is 26.89 kg/m².    General: Appears well and comfortable  Eyes: No scleral or conjunctival lesions  ENT: No apparent oral or nasal lesions  Head/Neck: No apparent scalp or neck lesions  Cardiovascular: Regular rate and rhythm  Respiratory: Breathing quiet and unlabored  Gastrointestinal: No apparent organomegaly or abdominal masses  Integumentary: No significant cutaneous lesions or dyspigmentation  Musculoskeletal: Mild to moderate tenderness of both hands (mostly 2nd-3rd MCP joints with mild periarticular soft tissue swelling suggestive of synovitis) and wrists (with minimal swelling dorsally)  Neurologic: No focal sensory or motor deficits  Psychiatric: Mood and affect appropriate      LABORATORY RESULTS REVIEWED AND INTERPRETED BY ME:  Lab Results   Component Value Date/Time    CREACTPROT 0.96 (H) 05/16/2024 03:05 PM    SEDRATEWES 18 05/16/2024 03:05 PM     Lab Results   Component Value Date/Time    RHEUMFACTN 80 (H) 03/08/2022 02:32 PM    CCPANTIBODY 81 (H) 03/23/2022 09:46 AM    YDUT44GPAU Negative 10/26/2022 03:55 PM     Lab Results   Component Value Date/Time    ANTINUCAB Detected (A) 03/23/2022 09:46 AM     Lab Results   Component Value Date/Time    ANTIDNADS 15 03/23/2022 09:46 AM    SMITHAB 3 03/23/2022 09:46 AM    RNPAB 7 03/23/2022 09:46 AM    ZZMDLGQ29 9 03/23/2022 09:46 AM    SSA60 3 03/23/2022 09:46 AM    SSA52 1 03/23/2022 09:46 AM    ANTISSBSJ 0 03/23/2022 09:46 AM    JO1AB 1 03/23/2022 09:46 AM     Lab Results   Component Value Date/Time    MICROSOMALA 51.2 (H) 07/24/2021 09:09 AM    ANTITHYROGL 286.8 (H) 07/24/2021 " 09:09 AM    TSHULTRASEN 0.080 (L) 02/13/2024 09:20 AM    FREET4 1.69 02/13/2024 09:20 AM     Lab Results   Component Value Date/Time    25HYDROXY 47 02/13/2024 09:20 AM     Lab Results   Component Value Date/Time    WBC 9.7 05/16/2024 03:05 PM    RBC 4.65 05/16/2024 03:05 PM    HEMOGLOBIN 14.3 05/16/2024 03:05 PM    HEMATOCRIT 43.9 05/16/2024 03:05 PM    MCV 94.4 05/16/2024 03:05 PM    MCH 30.8 05/16/2024 03:05 PM    MCHC 32.6 05/16/2024 03:05 PM    RDW 49.3 05/16/2024 03:05 PM    PLATELETCT 243 05/16/2024 03:05 PM    MPV 11.1 05/16/2024 03:05 PM    NEUTS 5.82 05/16/2024 03:05 PM    LYMPHOCYTES 24.80 05/16/2024 03:05 PM    MONOCYTES 7.90 05/16/2024 03:05 PM    EOSINOPHILS 5.80 05/16/2024 03:05 PM    BASOPHILS 1.00 05/16/2024 03:05 PM     Lab Results   Component Value Date/Time    ASTSGOT 19 05/16/2024 03:05 PM    ALTSGPT 14 05/16/2024 03:05 PM    ALKPHOSPHAT 63 05/16/2024 03:05 PM    TBILIRUBIN 0.3 05/16/2024 03:05 PM    TOTPROTEIN 6.5 05/16/2024 03:05 PM    ALBUMIN 3.6 05/16/2024 03:05 PM     Lab Results   Component Value Date/Time    SODIUM 140 05/16/2024 03:05 PM    POTASSIUM 4.2 05/16/2024 03:05 PM    CHLORIDE 108 05/16/2024 03:05 PM    CO2 22 05/16/2024 03:05 PM    GLUCOSE 101 (H) 05/16/2024 03:05 PM    BUN 14 05/16/2024 03:05 PM    CREATININE 0.74 05/16/2024 03:05 PM    CALCIUM 9.1 05/16/2024 03:05 PM     Lab Results   Component Value Date/Time    HEPBSAG Non-Reactive 01/19/2023 02:50 PM    HEPCAB Non-Reactive 01/19/2023 02:50 PM     Lab Results   Component Value Date/Time    CHOLSTRLTOT 173 02/13/2024 09:20 AM    LDL 97 02/13/2024 09:20 AM    HDL 62 02/13/2024 09:20 AM    TRIGLYCERIDE 68 02/13/2024 09:20 AM       RADIOLOGY RESULTS REVIEWED AND INTERPRETED BY ME:  Results for orders placed in visit on 03/23/22    DX-JOINT SURVEY-FEET SINGLE VIEW    Impression  1.  No evidence for inflammatory arthritis    2.  Postoperative changes of the right 5th proximal phalanx in the left 1st metatarsal head    3.   osteoarthritis of both 1st metatarsophalangeal joint    4.  Right accessory navicular, anatomic variant    Results for orders placed in visit on 03/23/22    DX-JOINT SURVEY-HANDS SINGLE VIEW    Impression  1.  No evidence for erosive arthritis    2.  Typical osteoarthritis of bilateral 1st carpometacarpal joint and distal interphalangeal joint      All relevant laboratory and imaging results reported on this note were reviewed and interpreted by me.         Assessment & Plan     Diane Crowell is a 63 y.o. female with history as noted above whose presentation merits the following clinical impressions and recommendations:    1. Seropositive rheumatoid arthritis (HCC)  Clinically and serologically active with a mild flare that is still involving, so would treat with another course of prednisone taper as she continues on her current regimen of leflunomide and Hadlima, the latter of which she has been on for only 4 doses so far. Prior to her next visit, need to reassess markers of disease activity to gauge overall trajectory in response to treatment.  - CRP QUANTITIVE (NON-CARDIAC); Future  - Sed Rate; Future  - ADALIMUMAB AND ABS, QUANT; Future  - Continue leflunomide 20 mg daily  - Continue Hadlima 40 mg SC every 2 weeks  - predniSONE (DELTASONE) 10 MG Tab; Take 3 Tablets by mouth every day for 7 days, THEN 2.5 Tablets every day for 7 days, THEN 2 Tablets every day for 7 days, THEN 1.5 Tablets every day for 7 days, THEN 1 Tablet every day for 7 days, THEN 0.5 Tablets every day for 7 days. Take in the morning with food.  Dispense: 74 Tablet; Refill: 0    2. Osteoarthritis involving multiple joints on both sides of body (hands and feet)  Presumably a significant component of her overall peripheral joint pain which can be managed supportively.  - Voltaren 1% gel and Tylenol Arthritis with or without oral NSAIDs as needed  - Consider intra-articular steroid injection if that becomes necessary    3. 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  - Consider repeat testing depending on her clinical trajectory    4. Immunosuppressed status (HCC)  Presently with no history, physical, or laboratory evidence to suggest significant adverse drug effects or opportunistic infections, but need routine monitoring per guidelines.  - CBC WITH DIFFERENTIAL; Future  - Comp Metabolic Panel; Future  - Need to ascertain age-appropriate vaccines in addition to the ones listed above under immunizations      The above assessment and plan were discussed with the patient who acknowledged understanding of the plan.    FOLLOW-UP: Return in about 3 months (around 8/22/2024) for Short.         Thank you for the opportunity to participate in the care of Diane Crowell.    Josr Mayer MD, MS, FACR  Rheumatologist, [facility] Rheumatology, [facility]   of Clinical Medicine, Department of Internal Medicine  [facility]

## 2024-05-22 NOTE — PATIENT INSTRUCTIONS
AFTER VISIT INSTRUCTIONS    Below are important information to help you navigate your healthcare needs and help us serve you safely and effectively:  If laboratory tests and/or imaging studies were ordered, remember to go get them done as instructed.  If new prescriptions and/or refills were sent, remember to go pick them up from your local pharmacy, or call the specialty pharmacy to request shipment.  Always take your prescription medications exactly as prescribed unless instructed otherwise.  Note that antirheumatic drugs and steroids are immunosuppressive which means they increase your risk of infections and have multiple potential adverse effects on various organ systems in your body, though many of them are uncommon.  It is important that you are up-to-date on age-appropriate immunizations, particularly shingles and bacterial/viral pneumonia vaccines, which you can request from me or your primary care provider.  Be sure to read the drug package inserts to learn about the potential side effects of your medications before you start taking them.  If you experience any significant drug side effects, stop taking the medication and notify me promptly, and depending on the severity of the side effects, consider going to an urgent care or emergency department for immediate attention.  If there are significant findings on your lab tests and imaging studies that warrant further action, I will notify you with explanations via [employer] or phone call, otherwise you can view them on [employer] and let me know if you have any questions.  Note that [employer] messages are typically read during office hours and may take 1-7 business days before a response depending on the urgency of the situation and how busy my clinic schedule is.  In general, [employer] messaging is for non-urgent matters that do not require immediate attention, so for urgent matters that cannot wait, you are advised to go to an urgent care.  You are granted [employer]  access to my documentation of your visit and are encouraged to read my note which details my assessment and plan for your condition.  To learn more about your condition and rheumatic diseases evaluated and treated by rheumatologists, as well as gain access to many helpful resources about these diseases, visit our website: [email or web address].  To properly dispose of your unused, unwanted, or residual medications/supplies, visit the Drug Enforcement Administration website to locate your closest drop-off location: www.[place].gov/everyday-takeback-day.

## 2024-05-26 DIAGNOSIS — M05.9 SEROPOSITIVE RHEUMATOID ARTHRITIS (HCC): ICD-10-CM

## 2024-05-29 RX ORDER — LEFLUNOMIDE 20 MG/1
20 TABLET ORAL DAILY
Qty: 90 TABLET | Refills: 3 | Status: SHIPPED | OUTPATIENT
Start: 2024-05-29

## 2024-05-31 ENCOUNTER — DOCUMENTATION (OUTPATIENT)
Dept: HEALTH INFORMATION MANAGEMENT | Facility: OTHER | Age: 64
End: 2024-05-31
Payer: COMMERCIAL

## 2024-08-22 ENCOUNTER — HOSPITAL ENCOUNTER (OUTPATIENT)
Dept: LAB | Facility: MEDICAL CENTER | Age: 64
End: 2024-08-22
Attending: STUDENT IN AN ORGANIZED HEALTH CARE EDUCATION/TRAINING PROGRAM
Payer: COMMERCIAL

## 2024-08-22 DIAGNOSIS — D84.9 IMMUNOSUPPRESSED STATUS (HCC): ICD-10-CM

## 2024-08-22 DIAGNOSIS — M05.9 SEROPOSITIVE RHEUMATOID ARTHRITIS (HCC): ICD-10-CM

## 2024-08-22 LAB
ALBUMIN SERPL BCP-MCNC: 3.9 G/DL (ref 3.2–4.9)
ALBUMIN/GLOB SERPL: 1.6 G/DL
ALP SERPL-CCNC: 59 U/L (ref 30–99)
ALT SERPL-CCNC: 23 U/L (ref 2–50)
ANION GAP SERPL CALC-SCNC: 13 MMOL/L (ref 7–16)
AST SERPL-CCNC: 26 U/L (ref 12–45)
BASOPHILS # BLD AUTO: 1.1 % (ref 0–1.8)
BASOPHILS # BLD: 0.1 K/UL (ref 0–0.12)
BILIRUB SERPL-MCNC: 0.4 MG/DL (ref 0.1–1.5)
BUN SERPL-MCNC: 12 MG/DL (ref 8–22)
CALCIUM ALBUM COR SERPL-MCNC: 9.4 MG/DL (ref 8.5–10.5)
CALCIUM SERPL-MCNC: 9.3 MG/DL (ref 8.5–10.5)
CHLORIDE SERPL-SCNC: 106 MMOL/L (ref 96–112)
CO2 SERPL-SCNC: 22 MMOL/L (ref 20–33)
CREAT SERPL-MCNC: 0.79 MG/DL (ref 0.5–1.4)
CRP SERPL HS-MCNC: <0.3 MG/DL (ref 0–0.75)
EOSINOPHIL # BLD AUTO: 0.52 K/UL (ref 0–0.51)
EOSINOPHIL NFR BLD: 5.8 % (ref 0–6.9)
ERYTHROCYTE [DISTWIDTH] IN BLOOD BY AUTOMATED COUNT: 48.4 FL (ref 35.9–50)
ERYTHROCYTE [SEDIMENTATION RATE] IN BLOOD BY WESTERGREN METHOD: 6 MM/HOUR (ref 0–25)
GFR SERPLBLD CREATININE-BSD FMLA CKD-EPI: 84 ML/MIN/1.73 M 2
GLOBULIN SER CALC-MCNC: 2.5 G/DL (ref 1.9–3.5)
GLUCOSE SERPL-MCNC: 77 MG/DL (ref 65–99)
HCT VFR BLD AUTO: 45.4 % (ref 37–47)
HGB BLD-MCNC: 15.1 G/DL (ref 12–16)
IMM GRANULOCYTES # BLD AUTO: 0.03 K/UL (ref 0–0.11)
IMM GRANULOCYTES NFR BLD AUTO: 0.3 % (ref 0–0.9)
LYMPHOCYTES # BLD AUTO: 2.82 K/UL (ref 1–4.8)
LYMPHOCYTES NFR BLD: 31.6 % (ref 22–41)
MCH RBC QN AUTO: 31.9 PG (ref 27–33)
MCHC RBC AUTO-ENTMCNC: 33.3 G/DL (ref 32.2–35.5)
MCV RBC AUTO: 95.8 FL (ref 81.4–97.8)
MONOCYTES # BLD AUTO: 0.72 K/UL (ref 0–0.85)
MONOCYTES NFR BLD AUTO: 8.1 % (ref 0–13.4)
NEUTROPHILS # BLD AUTO: 4.72 K/UL (ref 1.82–7.42)
NEUTROPHILS NFR BLD: 53.1 % (ref 44–72)
NRBC # BLD AUTO: 0 K/UL
NRBC BLD-RTO: 0 /100 WBC (ref 0–0.2)
PLATELET # BLD AUTO: 239 K/UL (ref 164–446)
PMV BLD AUTO: 11.7 FL (ref 9–12.9)
POTASSIUM SERPL-SCNC: 4.5 MMOL/L (ref 3.6–5.5)
PROT SERPL-MCNC: 6.4 G/DL (ref 6–8.2)
RBC # BLD AUTO: 4.74 M/UL (ref 4.2–5.4)
SODIUM SERPL-SCNC: 141 MMOL/L (ref 135–145)
WBC # BLD AUTO: 8.9 K/UL (ref 4.8–10.8)

## 2024-08-22 PROCEDURE — 36415 COLL VENOUS BLD VENIPUNCTURE: CPT

## 2024-08-22 PROCEDURE — 85025 COMPLETE CBC W/AUTO DIFF WBC: CPT

## 2024-08-22 PROCEDURE — 85652 RBC SED RATE AUTOMATED: CPT

## 2024-08-22 PROCEDURE — 86140 C-REACTIVE PROTEIN: CPT

## 2024-08-22 PROCEDURE — 82397 CHEMILUMINESCENT ASSAY: CPT

## 2024-08-22 PROCEDURE — 80145 DRUG ASSAY ADALIMUMAB: CPT

## 2024-08-22 PROCEDURE — 80053 COMPREHEN METABOLIC PANEL: CPT

## 2024-08-25 LAB
ADALIMUMAB AB SERPL IA-MCNC: <20 NG/ML
ADALIMUMAB SERPL IA-MCNC: 12.2 UG/ML

## 2024-08-27 ENCOUNTER — OFFICE VISIT (OUTPATIENT)
Dept: RHEUMATOLOGY | Facility: MEDICAL CENTER | Age: 64
End: 2024-08-27
Attending: STUDENT IN AN ORGANIZED HEALTH CARE EDUCATION/TRAINING PROGRAM
Payer: COMMERCIAL

## 2024-08-27 VITALS
WEIGHT: 146.5 LBS | HEART RATE: 94 BPM | HEIGHT: 62 IN | TEMPERATURE: 97.7 F | DIASTOLIC BLOOD PRESSURE: 76 MMHG | OXYGEN SATURATION: 93 % | SYSTOLIC BLOOD PRESSURE: 134 MMHG | BODY MASS INDEX: 26.96 KG/M2

## 2024-08-27 DIAGNOSIS — D84.9 IMMUNOSUPPRESSED STATUS (HCC): ICD-10-CM

## 2024-08-27 DIAGNOSIS — M15.9 OSTEOARTHRITIS INVOLVING MULTIPLE JOINTS ON BOTH SIDES OF BODY: ICD-10-CM

## 2024-08-27 DIAGNOSIS — R76.8 SEROLOGIC AUTOIMMUNITY: ICD-10-CM

## 2024-08-27 DIAGNOSIS — M05.9 SEROPOSITIVE RHEUMATOID ARTHRITIS (HCC): ICD-10-CM

## 2024-08-27 PROCEDURE — 99214 OFFICE O/P EST MOD 30 MIN: CPT | Performed by: STUDENT IN AN ORGANIZED HEALTH CARE EDUCATION/TRAINING PROGRAM

## 2024-08-27 PROCEDURE — 3078F DIAST BP <80 MM HG: CPT | Performed by: STUDENT IN AN ORGANIZED HEALTH CARE EDUCATION/TRAINING PROGRAM

## 2024-08-27 PROCEDURE — 99212 OFFICE O/P EST SF 10 MIN: CPT | Performed by: STUDENT IN AN ORGANIZED HEALTH CARE EDUCATION/TRAINING PROGRAM

## 2024-08-27 PROCEDURE — 3075F SYST BP GE 130 - 139MM HG: CPT | Performed by: STUDENT IN AN ORGANIZED HEALTH CARE EDUCATION/TRAINING PROGRAM

## 2024-08-27 ASSESSMENT — FIBROSIS 4 INDEX: FIB4 SCORE: 1.41

## 2024-08-27 NOTE — PROGRESS NOTES
[facility] RHEUMATOLOGY  [address], [address], [place] [address]  Phone: [phone] ? Fax: [phone]  [facility]/Health-Services/Rheumatology    FOLLOW-UP VISIT NOTE      DATE OF SERVICE: 08/27/2024         Subjective     PRIMARY CARE PRACTITIONER:  Cheryl M. Demucha, A.P.R.N.  [address] [place] [address]    PATIENT IDENTIFICATION:  Diane Crowell  [address] [place] [address]  [place] [address]    YOB: 1960    MEDICAL RECORD NUMBER: [id number]          CHIEF COMPLAINT:   Chief Complaint   Patient presents with    Follow-Up     Seropositive rheumatoid arthritis (HCC)       RHEUMATOLOGIC HISTORY:  Diane Crowell is a 63 y.o. female with pertinent history notable for seropositive rheumatoid arthritis diagnosed in 3/2022, osteoarthritis of multiple joints (hands and feet), and Hashimoto's thyroiditis with hypothyroidism. She initially presented on 3/22/22, reported onset of polyarthralgia in 7/2021 preceded by hypersensitivity rash from weeding at her yard. Since then she had been treated with several one-week courses of steroids and was on prednisone 2.5 mg daily when evaluated. With each course of steroid she experienced improvement but flared soon after completing the treatment. In addition, she has been managing with meloxicam 7.5 mg daily which she had been taken for a long time. She reported ongoing pain/swelling in her hands, wrists, and feet, as well as pain in her lower back and hips, associated with morning stiffness lasting all day and improving with activity.     Pertinent treatments: Prednisone 30>20 mg tapers during flares (on/off 3/2022-present, effective), methotrexate 15-20 mg weekly (3/2022-11/2022, ineffective), Enbrel 50 mg SC weekly (1/2023-12/2023, stopped due to formation of anti-Etanercept antibody), leflunomide 20 mg daily (11/2022-4/2023, flared on Enbrel alone, so restarted 5/2023-present), Humira>Hadlima 40 mg SC every 2 weeks (ordered 11/22/23-present).      Pertinent positive labs: Positive RF 80, anti-CCP 81, and JORGE 1:320 homogenous/cytoplasmic patterns with negative reflex panel (in 3/2022); positive anti-TPO 51.2 and anti-.8 (in 7/2021); positive anti-Etanercept antibody 540 ng/mL (in 7/2023).    Pertinent negative labs: Negative HLA-B27 (in 10/2022), vitamin D of 49<26 (in 12/2022), HBV, HCV, and QTB (in 1/2023), vitamin D (in 2/2024), anti-adalimumab antibody, CRP, ESR, eGFR, creatinine, LFTs, and CBC (in 8/2024).     Pertinent XR imaging: Hands (in 3/2022) with typical osteoarthritis of bilateral 1st carpometacarpal joint and distal interphalangeal joint. Feet (in 3/2022) with postoperative changes of the right 5th proximal phalanx in the left 1st metatarsal head, osteoarthritis of both 1st metatarsophalangeal joint, and right accessory navicular, anatomic variant.      INTERVAL HISTORY:  Reports interval history as noted on the questionnaire below or scanned under media tab.  Notably mild intermittent pain/swelling in the right hand (mostly MCP joints) and right wrist that worsen with overuse.  Doing quite well otherwise and feels that her current regimen of leflunomide and adalimumab have been very helpful.    REVIEW OF SYSTEMS:  Except as noted in the history above, relevant review of systems with emphasis on autoimmune rheumatic diseases was otherwise negative.      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) 03/22/2022    Swelling of both hands 03/08/2022    Cellulitis of right upper  extremity 01/26/2022    Psoriasiform eruption 12/07/2021    Hashimoto's thyroiditis 08/02/2021    Vitamin D deficiency 08/02/2021    Postmenopausal 08/02/2021    Tobacco dependence due to cigarettes 08/02/2021       PAST MEDICAL HISTORY:  Past Medical History:   Diagnosis Date    Seropositive rheumatoid arthritis (HCC) 3/22/2022    Thyroid disease     Tobacco dependence due to cigarettes        PAST SURGICAL HISTORY:  Past Surgical History:   Procedure Laterality Date    CT INJ LUMBAR/SACRAL,W/ IMAGING N/A 3/21/2023    Procedure: Caudal epidural steroid injection with catheter. PATIENT NEEDS TO HOLD NSAIDS FOR 5 DAYS PRIOR TO INJECTION;  Surgeon: Shantelle Winkler M.D.;  [facility];  Service: Pain Management       SOCIAL HISTORY:  Social History     Socioeconomic History    Marital status:      Spouse name: Not on file    Number of children: Not on file    Years of education: Not on file    Highest education level: Not on file   Occupational History    Not on file   Tobacco Use    Smoking status: Every Day     Current packs/day: 1.00     Average packs/day: 1 pack/day for 23.0 years (23.0 ttl pk-yrs)     Types: Cigarettes     Passive exposure: Current    Smokeless tobacco: Never   Vaping Use    Vaping status: Never Used   Substance and Sexual Activity    Alcohol use: Yes     Comment: occ    Drug use: Never    Sexual activity: Yes     Partners: Male     Birth control/protection: Post-Menopausal   Other Topics Concern    Not on file   Social History Narrative    Not on file     Social Determinants of Health     Financial Resource Strain: Not on file   Food Insecurity: Not on file   Transportation Needs: Not on file   Physical Activity: Not on file   Stress: Not on file   Social Connections: Not on file   Intimate Partner Violence: Not on file   Housing Stability: Not on file       FAMILY HISTORY:  Family History   Problem Relation Age of Onset    Other Father         ALS    Lung  "Disease Paternal Grandmother        MEDICATIONS:  Current Outpatient Medications   Medication Sig    leflunomide (ARAVA) 20 MG Tab Take 1 Tablet by mouth every day.    levothyroxine (SYNTHROID) 112 MCG Tab Take 1 Tablet by mouth every morning on an empty stomach. Take 1 tablet by mouth every morning on an empty stomach    albuterol 108 (90 Base) MCG/ACT Aero Soln inhalation aerosol Inhale 2 Puffs every four hours as needed for Shortness of Breath.    HADLIMA PUSHTOUCH 40 MG/0.4ML Solution Auto-injector INJECT 40 MG UNDER THE SKIN EVERY 14 DAYS.    budesonide-formoterol (SYMBICORT) 80-4.5 MCG/ACT Aerosol Inhale 2 Puffs 2 times a day.    estradiol-norethindrone (ACTIVELLA) 1-0.5 MG per tablet Take 1 Tablet by mouth every day.    VITAMIN D PO Take  by mouth.    Calcium Carbonate (CALCIUM 500 PO) Take  by mouth.    benzonatate (TESSALON) 100 MG Cap Take 1 Capsule by mouth 3 times a day as needed for Cough. (Patient not taking: Reported on 8/27/2024)       ALLERGIES:   Allergies   Allergen Reactions    Codeine     Pcn [Penicillins]        IMMUNIZATIONS:  Immunization History   Administered Date(s) Administered    Influenza Seasonal Injectable - Historical Data 12/18/2012    Influenza Vac Subunit Quad Inj (Pf) 10/15/2018    Influenza Vaccine Quad Inj (Pf) 10/07/2017, 10/18/2019, 10/15/2022    Influenza Vaccine Quad Recombinant 10/06/2020, 11/18/2021    Zoster Vaccine Live (ZVL) (Zostavax) - HISTORICAL DATA 09/20/2016    Zoster Vaccine Recombinant (RZV) (SHINGRIX) 10/15/2022, 01/26/2023            Objective     Vital Signs: /76 (BP Location: Left arm, Patient Position: Sitting, BP Cuff Size: Adult)   Pulse 94   Temp 36.5 °C (97.7 °F) (Temporal)   Ht 1.575 m (5' 2\")   Wt 66.5 kg (146 lb 8 oz)   SpO2 93% Body mass index is 26.8 kg/m².    General: Appears well and comfortable  Eyes: No scleral or conjunctival lesions  ENT: No apparent oral or nasal lesions  Head/Neck: No apparent scalp or neck " lesions  Cardiovascular: Regular rate and rhythm  Respiratory: Breathing quiet and unlabored  Gastrointestinal: No apparent organomegaly or abdominal masses  Integumentary: No significant cutaneous lesions or dyspigmentation  Musculoskeletal: Poorly localized mild tenderness of both hands (over 2nd-4th MCP joints) and wrists (over dorsal aspect); no definite synovitis or significant restriction in range of motion of joints examined   Neurologic: No focal sensory or motor deficits  Psychiatric: Mood and affect appropriate      LABORATORY RESULTS REVIEWED AND INTERPRETED BY ME:  Lab Results   Component Value Date/Time    CREACTPROT <0.30 08/22/2024 12:15 PM    SEDRATEWES 6 08/22/2024 12:15 PM     Lab Results   Component Value Date/Time    RHEUMFACTN 80 (H) 03/08/2022 02:32 PM    CCPANTIBODY 81 (H) 03/23/2022 09:46 AM    [id number] Negative 10/26/2022 03:55 PM     Lab Results   Component Value Date/Time    ANTINUCAB Detected (A) 03/23/2022 09:46 AM     Lab Results   Component Value Date/Time    ANTIDNADS 15 03/23/2022 09:46 AM    SMITHAB 3 03/23/2022 09:46 AM    RNPAB 7 03/23/2022 09:46 AM    YGGVTVU17 9 03/23/2022 09:46 AM    SSA60 3 03/23/2022 09:46 AM    SSA52 1 03/23/2022 09:46 AM    ANTISSBSJ 0 03/23/2022 09:46 AM    JO1AB 1 03/23/2022 09:46 AM     Lab Results   Component Value Date/Time    MICROSOMALA 51.2 (H) 07/24/2021 09:09 AM    ANTITHYROGL 286.8 (H) 07/24/2021 09:09 AM    TSHULTRASEN 0.080 (L) 02/13/2024 09:20 AM    FREET4 1.69 02/13/2024 09:20 AM     Lab Results   Component Value Date/Time    25HYDROXY 47 02/13/2024 09:20 AM     Lab Results   Component Value Date/Time    WBC 8.9 08/22/2024 12:15 PM    RBC 4.74 08/22/2024 12:15 PM    HEMOGLOBIN 15.1 08/22/2024 12:15 PM    HEMATOCRIT 45.4 08/22/2024 12:15 PM    MCV 95.8 08/22/2024 12:15 PM    MCH 31.9 08/22/2024 12:15 PM    MCHC 33.3 08/22/2024 12:15 PM    RDW 48.4 08/22/2024 12:15 PM    PLATELETCT 239 08/22/2024 12:15 PM    MPV 11.7 08/22/2024 12:15 PM     NEUTS 4.72 08/22/2024 12:15 PM    LYMPHOCYTES 31.60 08/22/2024 12:15 PM    MONOCYTES 8.10 08/22/2024 12:15 PM    EOSINOPHILS 5.80 08/22/2024 12:15 PM    BASOPHILS 1.10 08/22/2024 12:15 PM     Lab Results   Component Value Date/Time    ASTSGOT 26 08/22/2024 12:15 PM    ALTSGPT 23 08/22/2024 12:15 PM    ALKPHOSPHAT 59 08/22/2024 12:15 PM    TBILIRUBIN 0.4 08/22/2024 12:15 PM    TOTPROTEIN 6.4 08/22/2024 12:15 PM    ALBUMIN 3.9 08/22/2024 12:15 PM     Lab Results   Component Value Date/Time    SODIUM 141 08/22/2024 12:15 PM    POTASSIUM 4.5 08/22/2024 12:15 PM    CHLORIDE 106 08/22/2024 12:15 PM    CO2 22 08/22/2024 12:15 PM    GLUCOSE 77 08/22/2024 12:15 PM    BUN 12 08/22/2024 12:15 PM    CREATININE 0.79 08/22/2024 12:15 PM    CALCIUM 9.3 08/22/2024 12:15 PM     Lab Results   Component Value Date/Time    HEPBSAG Non-Reactive 01/19/2023 02:50 PM    HEPCAB Non-Reactive 01/19/2023 02:50 PM     Lab Results   Component Value Date/Time    CHOLSTRLTOT 173 02/13/2024 09:20 AM    LDL 97 02/13/2024 09:20 AM    HDL 62 02/13/2024 09:20 AM    TRIGLYCERIDE 68 02/13/2024 09:20 AM       RADIOLOGY RESULTS REVIEWED AND INTERPRETED BY ME:  Results for orders placed in visit on 03/23/22    DX-JOINT SURVEY-FEET SINGLE VIEW    Impression  1.  No evidence for inflammatory arthritis    2.  Postoperative changes of the right 5th proximal phalanx in the left 1st metatarsal head    3.  osteoarthritis of both 1st metatarsophalangeal joint    4.  Right accessory navicular, anatomic variant    Results for orders placed in visit on 03/23/22    DX-JOINT SURVEY-HANDS SINGLE VIEW    Impression  1.  No evidence for erosive arthritis    2.  Typical osteoarthritis of bilateral 1st carpometacarpal joint and distal interphalangeal joint      All relevant laboratory and imaging results reported on this note were reviewed and interpreted by me.         Assessment & Plan     Diane Crowell is a 63 y.o. female with history as noted above whose  presentation merits the following clinical impressions and recommendations:    1. Seropositive rheumatoid arthritis (HCC)  Clinically and serologically without significant evidence of disease activity on the current regimen of leflunomide and adalimumab, so no need for modification of treatment. However, given the potential for smoldering disease activity with limited clinical manifestations, need to occasionally reassess serologic markers of disease activity and risk stratification to gauge overall trajectory in response to ongoing treatment.  - CRP QUANTITIVE (NON-CARDIAC); Future  - Sed Rate; Future  - Continue leflunomide 20 mg daily  - Continue Hadlima 40 mg SC every 2 weeks    2. Osteoarthritis involving multiple joints (hands and feet)  Active problem that is presumably a significant component of her overall joint pain which can be managed supportively.  - Tylenol Arthritis and Voltaren 1% gel with or without oral NSAIDs as needed  - Consider intra-articular steroid injection if that becomes necessary    3. 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  - Consider repeat testing depending on her clinical trajectory    4. Immunosuppressed status (HCC)  Secondary to immunosuppressive therapy, but presently with no history, physical, or laboratory evidence to suggest significant adverse drug effects or opportunistic infections, but need routine monitoring per guidelines.  - CBC WITH DIFFERENTIAL; Future  - Comp Metabolic Panel; Future  - Need to ascertain age-appropriate vaccines in addition to the ones listed above under immunizations      The above assessment and plan were discussed with the patient who acknowledged understanding of the plan.    FOLLOW-UP: Return in about 6 months (around 2/27/2025) for Short.          Thank you for the opportunity to participate in the care of Diane Crowell.    Josr Mayer MD, MS, FACR  Rheumatologist, [facility] Rheumatology, [facility]   of Clinical Medicine, Department of Internal Medicine  [facility] [employer] [facility]

## 2024-08-27 NOTE — PATIENT INSTRUCTIONS
[facility] AFTER [facility]    Below are important guidelines to help you navigate your health care needs and assist us in caring for you safely and effectively. We encourage you to carefully read and understand this information and adhere to them accordingly.    [employer] Messaging and Phone Calls:  Diagnosis and Treatment - For a detailed explanation of your condition and treatment plan from today's visit, refer to the visit note on [employer] via the following steps:  Log in to [employer] and click on “Visits” at the top.  Scroll down to “Past Visits” under Appointments.  Click on “View Notes” under the appropriate visit date.  Questions or Concerns - MyChart messaging is for non-urgent matters that do not require immediate attention and should be brief with no more than two questions or concerns. If you have multiple questions or concerns, we ask that you schedule an appointment to have them properly addressed.  Response to Messages - [employer] messages are addressed throughout the week depending on clinical availability, so we ask that you allow up to one week for a response.  Phone Calls and Voicemails - Phone calls and voicemail messages are reserved for time-sensitive matters that cannot wait to be addressed via [employer]. We ask that you refrain from calling the office multiple times or leaving multiple voicemails regarding the same issue as doing so may lead to delays in response time.  Urgent Issues - For urgent medical matters or medical emergencies that cannot wait, you are advised to go to your nearest Urgent Care or Emergency Department for immediate attention.    Laboratory Tests and Imaging Studies:  Future Lab and Imaging Orders - We ask that you get your lab tests and imaging studies done no later than one week before your follow-up visit unless instructed otherwise.  Results Communication - You may see some test results marked as “abnormal” that are not necessarily significant or concerning. If  there are significant abnormalities on your test results that warrant further action, you will be notified via MyChart or phone call, otherwise they will be addressed at your follow-up visit.    Prescriptions and Refill Requests:  General Prescriptions (e.g. prednisone, hydroxychloroquine, leflunomide, methotrexate, etc.) - These are sent to Retail Pharmacies, so all refill requests of these medications should be directed to your local pharmacy.  Specialty Prescriptions (e.g. Enbrel, Humira, Cosentyx, Xeljanz, etc.) - These are sent to Specialty Pharmacies, so all refill requests of these medications should be directed to your designated specialty pharmacy.  Infusion Prescriptions (e.g. Remicade, Simponi Aria, Rituxan, Saphnelo, etc.) - These are sent to Outpatient Infusion Centers, so all scheduling requests of these medications should be directed to your local infusion center.    Medication Risks and Adverse Effects:  Immunosuppressed Status - Steroids and antirheumatic drugs are immunosuppressants, so they increase the risk of infections and can have side effects on various organ systems in your body, though most of them are uncommon.  Potential Side Effects - Be sure to read the drug package inserts to learn about the potential side effects of your medications before you start taking them and take them exactly as prescribed unless instructed otherwise.  In Case of Side Effects - If you experience any significant side effects, stop taking the medication immediately and promptly notify the prescriber. Depending on the severity of the side effects, consider going to an Urgent Care or Emergency Department for immediate attention.    Immunizations and Health Screening:  Vaccinations - If you are on immunosuppressive therapy, it is important that you are up to date on age-appropriate immunizations, particularly shingles and pneumonia vaccines, which you can request from your primary care provider or from us at your  next appointment.  Screening Tests - It is also important that you are up to date on age-appropriate screening tests, such as pap smear, mammography, and colonoscopy, which you can request from your primary care provider.    Educational and Supportive Resources:  [facility] ([email or web address]) - Visit our website to learn more about your condition and other rheumatic diseases, and gain access to many helpful resources for them.  Disposal of Old Medications (www.kiko.gov/everyday-takeback-day) - Visit the Drug Enforcement Administration website to find a nearby location where you can properly dispose of old medications you no longer need.  Disposal of Used [place] (www.safeneedledisposal.org) - Visit the Safe Needle Disposal Organization website to find a nearby location where you can properly dispose of used needles from your injectable medications.

## 2024-10-10 ENCOUNTER — OFFICE VISIT (OUTPATIENT)
Dept: URGENT CARE | Facility: PHYSICIAN GROUP | Age: 64
End: 2024-10-10
Payer: COMMERCIAL

## 2024-10-10 ENCOUNTER — APPOINTMENT (OUTPATIENT)
Dept: RADIOLOGY | Facility: IMAGING CENTER | Age: 64
End: 2024-10-10
Attending: FAMILY MEDICINE
Payer: COMMERCIAL

## 2024-10-10 VITALS
HEIGHT: 62 IN | TEMPERATURE: 98.1 F | DIASTOLIC BLOOD PRESSURE: 78 MMHG | RESPIRATION RATE: 16 BRPM | WEIGHT: 146 LBS | BODY MASS INDEX: 26.87 KG/M2 | HEART RATE: 90 BPM | SYSTOLIC BLOOD PRESSURE: 124 MMHG | OXYGEN SATURATION: 91 %

## 2024-10-10 DIAGNOSIS — R05.1 ACUTE COUGH: ICD-10-CM

## 2024-10-10 DIAGNOSIS — R06.02 SOB (SHORTNESS OF BREATH): ICD-10-CM

## 2024-10-10 LAB
FLUAV RNA SPEC QL NAA+PROBE: NEGATIVE
FLUBV RNA SPEC QL NAA+PROBE: NEGATIVE
RSV RNA SPEC QL NAA+PROBE: NEGATIVE
SARS-COV-2 RNA RESP QL NAA+PROBE: NEGATIVE

## 2024-10-10 PROCEDURE — 71046 X-RAY EXAM CHEST 2 VIEWS: CPT | Mod: TC,FY | Performed by: FAMILY MEDICINE

## 2024-10-10 PROCEDURE — 0241U POCT CEPHEID COV-2, FLU A/B, RSV - PCR: CPT | Performed by: FAMILY MEDICINE

## 2024-10-10 PROCEDURE — 99214 OFFICE O/P EST MOD 30 MIN: CPT | Mod: 25 | Performed by: FAMILY MEDICINE

## 2024-10-10 PROCEDURE — 3078F DIAST BP <80 MM HG: CPT | Performed by: FAMILY MEDICINE

## 2024-10-10 PROCEDURE — 94640 AIRWAY INHALATION TREATMENT: CPT | Performed by: FAMILY MEDICINE

## 2024-10-10 PROCEDURE — 3074F SYST BP LT 130 MM HG: CPT | Performed by: FAMILY MEDICINE

## 2024-10-10 RX ORDER — IPRATROPIUM BROMIDE AND ALBUTEROL SULFATE 2.5; .5 MG/3ML; MG/3ML
3 SOLUTION RESPIRATORY (INHALATION) ONCE
Status: COMPLETED | OUTPATIENT
Start: 2024-10-10 | End: 2024-10-10

## 2024-10-10 RX ORDER — PREDNISONE 20 MG/1
40 TABLET ORAL DAILY
Qty: 10 TABLET | Refills: 0 | Status: SHIPPED | OUTPATIENT
Start: 2024-10-10 | End: 2024-10-15

## 2024-10-10 RX ORDER — DEXTROMETHORPHAN HYDROBROMIDE AND PROMETHAZINE HYDROCHLORIDE 15; 6.25 MG/5ML; MG/5ML
5 SYRUP ORAL 4 TIMES DAILY PRN
Qty: 120 ML | Refills: 0 | Status: SHIPPED | OUTPATIENT
Start: 2024-10-10

## 2024-10-10 RX ORDER — DOXYCYCLINE HYCLATE 100 MG
100 TABLET ORAL 2 TIMES DAILY
Qty: 20 TABLET | Refills: 0 | Status: SHIPPED | OUTPATIENT
Start: 2024-10-10 | End: 2024-10-20

## 2024-10-10 RX ADMIN — IPRATROPIUM BROMIDE AND ALBUTEROL SULFATE 3 ML: 2.5; .5 SOLUTION RESPIRATORY (INHALATION) at 17:07

## 2024-10-10 ASSESSMENT — ENCOUNTER SYMPTOMS
NAUSEA: 0
WEIGHT LOSS: 0
EYE DISCHARGE: 0
MYALGIAS: 0
EYE REDNESS: 0
VOMITING: 0

## 2024-10-10 ASSESSMENT — FIBROSIS 4 INDEX: FIB4 SCORE: 1.43

## 2024-10-28 ENCOUNTER — TELEPHONE (OUTPATIENT)
Dept: RHEUMATOLOGY | Facility: MEDICAL CENTER | Age: 64
End: 2024-10-28
Payer: COMMERCIAL

## 2025-01-20 ENCOUNTER — OFFICE VISIT (OUTPATIENT)
Dept: MEDICAL GROUP | Facility: PHYSICIAN GROUP | Age: 65
End: 2025-01-20
Payer: COMMERCIAL

## 2025-01-20 VITALS
OXYGEN SATURATION: 95 % | TEMPERATURE: 97.1 F | HEIGHT: 62 IN | DIASTOLIC BLOOD PRESSURE: 84 MMHG | SYSTOLIC BLOOD PRESSURE: 118 MMHG | RESPIRATION RATE: 14 BRPM | WEIGHT: 150.8 LBS | BODY MASS INDEX: 27.75 KG/M2 | HEART RATE: 83 BPM

## 2025-01-20 DIAGNOSIS — J44.9 COPD ON LONG-TERM INHALED STEROID THERAPY (HCC): ICD-10-CM

## 2025-01-20 DIAGNOSIS — M54.41 CHRONIC LOW BACK PAIN WITH BILATERAL SCIATICA, UNSPECIFIED BACK PAIN LATERALITY: ICD-10-CM

## 2025-01-20 DIAGNOSIS — J20.9 ACUTE WHEEZY BRONCHITIS: ICD-10-CM

## 2025-01-20 DIAGNOSIS — Z13.9 ENCOUNTER FOR SCREENING: ICD-10-CM

## 2025-01-20 DIAGNOSIS — G89.29 CHRONIC LOW BACK PAIN WITH BILATERAL SCIATICA, UNSPECIFIED BACK PAIN LATERALITY: ICD-10-CM

## 2025-01-20 DIAGNOSIS — M54.42 CHRONIC LOW BACK PAIN WITH BILATERAL SCIATICA, UNSPECIFIED BACK PAIN LATERALITY: ICD-10-CM

## 2025-01-20 DIAGNOSIS — E03.9 HYPOTHYROIDISM, UNSPECIFIED TYPE: ICD-10-CM

## 2025-01-20 DIAGNOSIS — Z12.11 ENCOUNTER FOR SCREENING FOR MALIGNANT NEOPLASM OF COLON: ICD-10-CM

## 2025-01-20 DIAGNOSIS — E55.9 VITAMIN D DEFICIENCY: ICD-10-CM

## 2025-01-20 DIAGNOSIS — F17.210 TOBACCO DEPENDENCE DUE TO CIGARETTES: ICD-10-CM

## 2025-01-20 DIAGNOSIS — Z79.51 COPD ON LONG-TERM INHALED STEROID THERAPY (HCC): ICD-10-CM

## 2025-01-20 PROBLEM — L03.113 CELLULITIS OF RIGHT UPPER EXTREMITY: Status: RESOLVED | Noted: 2022-01-26 | Resolved: 2025-01-20

## 2025-01-20 PROBLEM — L57.0 ACTINIC KERATOSES: Status: RESOLVED | Noted: 2024-01-04 | Resolved: 2025-01-20

## 2025-01-20 PROBLEM — L30.8 PSORIASIFORM ERUPTION: Status: RESOLVED | Noted: 2021-12-07 | Resolved: 2025-01-20

## 2025-01-20 PROBLEM — R06.02 SOB (SHORTNESS OF BREATH): Status: RESOLVED | Noted: 2023-04-11 | Resolved: 2025-01-20

## 2025-01-20 PROBLEM — M05.9 SEROPOSITIVE RHEUMATOID ARTHRITIS (HCC): Status: ACTIVE | Noted: 2022-03-08

## 2025-01-20 PROBLEM — J41.0 SIMPLE CHRONIC BRONCHITIS (HCC): Status: ACTIVE | Noted: 2025-01-20

## 2025-01-20 PROCEDURE — 99214 OFFICE O/P EST MOD 30 MIN: CPT

## 2025-01-20 RX ORDER — BUDESONIDE AND FORMOTEROL FUMARATE DIHYDRATE 80; 4.5 UG/1; UG/1
2 AEROSOL RESPIRATORY (INHALATION) 2 TIMES DAILY
Qty: 3 EACH | Refills: 3 | Status: SHIPPED | OUTPATIENT
Start: 2025-01-20

## 2025-01-20 RX ORDER — ALBUTEROL SULFATE 90 UG/1
2 INHALANT RESPIRATORY (INHALATION) EVERY 4 HOURS PRN
Qty: 1 EACH | Refills: 5 | Status: SHIPPED | OUTPATIENT
Start: 2025-01-20

## 2025-01-20 RX ORDER — LEVOTHYROXINE SODIUM 112 UG/1
112 TABLET ORAL
Qty: 90 TABLET | Refills: 3 | Status: SHIPPED | OUTPATIENT
Start: 2025-01-20

## 2025-01-20 RX ORDER — BENZONATATE 100 MG/1
100 CAPSULE ORAL 3 TIMES DAILY PRN
Qty: 30 CAPSULE | Refills: 0 | Status: SHIPPED | OUTPATIENT
Start: 2025-01-20

## 2025-01-20 RX ORDER — BUPROPION HYDROCHLORIDE 150 MG/1
TABLET, EXTENDED RELEASE ORAL
Qty: 90 TABLET | Refills: 0 | Status: SHIPPED | OUTPATIENT
Start: 2025-01-20

## 2025-01-20 ASSESSMENT — ENCOUNTER SYMPTOMS
CONSTIPATION: 0
DEPRESSION: 0
DIARRHEA: 0
HEADACHES: 0
COUGH: 1
WEIGHT LOSS: 0
SHORTNESS OF BREATH: 0
HEARTBURN: 0
INSOMNIA: 0
WHEEZING: 1
BACK PAIN: 0
SPUTUM PRODUCTION: 0

## 2025-01-20 ASSESSMENT — FIBROSIS 4 INDEX: FIB4 SCORE: 1.43

## 2025-01-20 ASSESSMENT — PATIENT HEALTH QUESTIONNAIRE - PHQ9: CLINICAL INTERPRETATION OF PHQ2 SCORE: 0

## 2025-01-20 NOTE — ASSESSMENT & PLAN NOTE
-Chronic, stable.  -Advised patient to continue taking 6000 mg of vitamin D and her calcium supplement daily  -Plan lab order today includes vitamin D serum level  Orders:    VITAMIN D,25 HYDROXY (DEFICIENCY); Future

## 2025-01-20 NOTE — ASSESSMENT & PLAN NOTE
-Chronic, stable.  Patient denies any medication adjustments within the last 10 years  -Will continue levothyroxine 112 mcg daily  -Relevant lab work ordered today include CBC, lipid profile, and TSH  Orders:    CBC WITH DIFFERENTIAL; Future    Lipid Profile; Future    TSH WITH REFLEX TO FT4; Future    levothyroxine (SYNTHROID) 112 MCG Tab; Take 1 Tablet by mouth every morning on an empty stomach. Take 1 tablet by mouth every morning on an empty stomach

## 2025-01-20 NOTE — ASSESSMENT & PLAN NOTE
-Suspected diagnosis.  Given benefit patient has had with inhaler medications.  Patient with significant smoking history and chronic cough.  Although, she has not been formally diagnosed  -Refilled patient's medications today.  -Order submitted for pulmonary function testing for formal diagnosis  Orders:    PULMONARY FUNCTION TESTS -Test requested: Complete Pulmonary Function Test; Future    albuterol 108 (90 Base) MCG/ACT Aero Soln inhalation aerosol; Inhale 2 Puffs every four hours as needed for Shortness of Breath.    benzonatate (TESSALON) 100 MG Cap; Take 1 Capsule by mouth 3 times a day as needed for Cough.    budesonide-formoterol (SYMBICORT) 80-4.5 MCG/ACT Aerosol; Inhale 2 Puffs 2 times a day.

## 2025-01-20 NOTE — PROGRESS NOTES
Subjective:     CC:  Diagnoses of Vitamin D deficiency, Tobacco dependence due to cigarettes, Hypothyroidism, unspecified type, Chronic low back pain with bilateral sciatica, unspecified back pain laterality, COPD on long-term inhaled steroid therapy (HCC), Acute wheezy bronchitis, Encounter for screening, and Encounter for screening for malignant neoplasm of colon were pertinent to this visit.    HISTORY OF THE PRESENT ILLNESS: Patient is a 64 y.o. female. This pleasant patient is here today to establish care and discuss blood work, chronic cough, and smoking cessation.    Problem   Copd On Long-Term Inhaled Steroid Therapy (Hcc)   Seropositive Rheumatoid Arthritis (Hcc)    Established with Rheumatology, Dr. Josr Mayer with [facility]. Next appointment is 2/26/2025.  Injection of hedlima every other week  Lefunimide PO daily     Vitamin D Deficiency    Patients states this is from her RA. Has been taking 6000 daily.  Last vitamin D serum value was 47 (2/13/2024)     Postmenopausal    Experienced premature ovarian failure. On HRT, has been on since her early 40s.     Tobacco Dependence Due to Cigarettes    Has been smoking 20 years, has averaged a half a pack a day in that time. She is interested quitting, possibly with medication.     Hypothyroidism    >>OVERVIEW FOR HASHIMOTO'S THYROIDITIS WRITTEN ON 1/20/2025  9:06 AM BY MALENA WHELAN D.O.    Has been on thyroid medication over the last 20-25 years. Feels well on the currently dosed 112.  Was established with endocrinologist, last seen 8 years ago.      Actinic Keratoses (Resolved)   Sob (Shortness of Breath) (Resolved)   Cellulitis of Right Upper Extremity (Resolved)   Psoriasiform Eruption (Resolved)       Health Maintenance: Completed    ROS:   Review of Systems   Constitutional:  Negative for weight loss.   Respiratory:  Positive for cough and wheezing. Negative for sputum production and shortness of breath.    Cardiovascular:  Negative for leg  "swelling.   Gastrointestinal:  Negative for constipation, diarrhea and heartburn.   Genitourinary:  Negative for dysuria, frequency and urgency.   Musculoskeletal:  Negative for back pain.   Neurological:  Negative for headaches.   Psychiatric/Behavioral:  Negative for depression. The patient does not have insomnia.          Objective:     Exam: /84   Pulse 83   Temp 36.2 °C (97.1 °F) (Temporal)   Resp 14   Ht 1.575 m (5' 2\")   Wt 68.4 kg (150 lb 12.8 oz)   SpO2 95%  Body mass index is 27.58 kg/m².    Physical Exam  Constitutional:       General: She is not in acute distress.     Appearance: Normal appearance. She is not ill-appearing.   HENT:      Head: Normocephalic and atraumatic.      Right Ear: Tympanic membrane normal. There is no impacted cerumen.      Left Ear: Tympanic membrane normal. There is no impacted cerumen.      Nose: Nose normal.      Mouth/Throat:      Mouth: Mucous membranes are moist.      Pharynx: Oropharynx is clear. No posterior oropharyngeal erythema.   Eyes:      Extraocular Movements: Extraocular movements intact.      Conjunctiva/sclera: Conjunctivae normal.      Pupils: Pupils are equal, round, and reactive to light.   Cardiovascular:      Rate and Rhythm: Normal rate and regular rhythm.      Heart sounds: No murmur heard.  Pulmonary:      Effort: Pulmonary effort is normal.      Breath sounds: Wheezing present.   Abdominal:      General: Abdomen is flat. There is no distension.      Palpations: Abdomen is soft.      Tenderness: There is no abdominal tenderness.   Musculoskeletal:         General: Normal range of motion.      Cervical back: Normal range of motion.   Lymphadenopathy:      Cervical: No cervical adenopathy.   Skin:     General: Skin is warm and dry.      Capillary Refill: Capillary refill takes less than 2 seconds.   Neurological:      General: No focal deficit present.      Mental Status: She is alert and oriented to person, place, and time.      Deep Tendon " Reflexes: Reflexes normal.   Psychiatric:         Mood and Affect: Mood normal.               Assessment & Plan:   64 y.o. female with the following -    Assessment & Plan  Vitamin D deficiency  -Chronic, stable.  -Advised patient to continue taking 6000 mg of vitamin D and her calcium supplement daily  -Plan lab order today includes vitamin D serum level  Orders:    VITAMIN D,25 HYDROXY (DEFICIENCY); Future    Tobacco dependence due to cigarettes  -Patient with a 15 to 20-year smoking history, desiring to quit as her  recently did as well.  -Rx sent for bupropion for smoking cessation, next, benefits, and instructions reviewed with the patient.  -Referral also submitted for lung cancer screening program.  Orders:    REFERRAL TO LUNG CANCER SCREENING PROGRAM    Hypothyroidism, unspecified type  -Chronic, stable.  Patient denies any medication adjustments within the last 10 years  -Will continue levothyroxine 112 mcg daily  -Relevant lab work ordered today include CBC, lipid profile, and TSH  Orders:    CBC WITH DIFFERENTIAL; Future    Lipid Profile; Future    TSH WITH REFLEX TO FT4; Future    levothyroxine (SYNTHROID) 112 MCG Tab; Take 1 Tablet by mouth every morning on an empty stomach. Take 1 tablet by mouth every morning on an empty stomach    Chronic low back pain with bilateral sciatica, unspecified back pain laterality  -Chronic, ongoing somewhat worsening over the last several months.  -Home stretching reviewed with the patient.  -Physical therapy referral submitted today  Orders:    Referral to Physical Therapy    COPD on long-term inhaled steroid therapy (HCC)  -Suspected diagnosis.  Given benefit patient has had with inhaler medications.  Patient with significant smoking history and chronic cough.  Although, she has not been formally diagnosed  -Refilled patient's medications today.  -Order submitted for pulmonary function testing for formal diagnosis  Orders:    PULMONARY FUNCTION TESTS -Test  requested: Complete Pulmonary Function Test; Future    albuterol 108 (90 Base) MCG/ACT Aero Soln inhalation aerosol; Inhale 2 Puffs every four hours as needed for Shortness of Breath.    benzonatate (TESSALON) 100 MG Cap; Take 1 Capsule by mouth 3 times a day as needed for Cough.    budesonide-formoterol (SYMBICORT) 80-4.5 MCG/ACT Aerosol; Inhale 2 Puffs 2 times a day.    Encounter for screening  -Healthcare maintenance screenings reviewed with the patient  -Relevant lab work ordered today includes HIV screening  Orders:    HIV AG/AB COMBO ASSAY SCREENING; Future    Encounter for screening for malignant neoplasm of colon  -Relevant healthcare maintenance screenings reviewed with the patient  -Order submitted for Cologuard today.  Orders:    Cologuard® colon cancer screening      Return in about 4 months (around 5/20/2025) for f/u lab work review, med check.    Please note that this dictation was created using voice recognition software. I have made every reasonable attempt to correct obvious errors, but I expect that there are errors of grammar and possibly content that I did not discover before finalizing the note.        Hernandez Cisneros D.O.

## 2025-01-20 NOTE — ASSESSMENT & PLAN NOTE
-Chronic, ongoing somewhat worsening over the last several months.  -Home stretching reviewed with the patient.  -Physical therapy referral submitted today  Orders:    Referral to Physical Therapy

## 2025-01-20 NOTE — ASSESSMENT & PLAN NOTE
-Patient with a 15 to 20-year smoking history, desiring to quit as her  recently did as well.  -Rx sent for bupropion for smoking cessation, next, benefits, and instructions reviewed with the patient.  -Referral also submitted for lung cancer screening program.  Orders:    REFERRAL TO LUNG CANCER SCREENING PROGRAM

## 2025-01-21 ENCOUNTER — TELEPHONE (OUTPATIENT)
Dept: HEALTH INFORMATION MANAGEMENT | Facility: OTHER | Age: 65
End: 2025-01-21
Payer: COMMERCIAL

## 2025-01-22 NOTE — TELEPHONE ENCOUNTER
Called to verify program eligibility/LVM with Direct line for call back x [phone]/Warm Transfer to Patient Outreach [phone]

## 2025-02-04 DIAGNOSIS — M05.9 SEROPOSITIVE RHEUMATOID ARTHRITIS (HCC): ICD-10-CM

## 2025-02-04 RX ORDER — ADALIMUMAB-BWWD 40 MG/.4ML
1 SOLUTION SUBCUTANEOUS
Qty: 0.8 ML | Refills: 11 | Status: SHIPPED | OUTPATIENT
Start: 2025-02-04

## 2025-02-16 LAB — NONINV COLON CA DNA+OCC BLD SCRN STL QL: NEGATIVE

## 2025-02-18 ENCOUNTER — RESULTS FOLLOW-UP (OUTPATIENT)
Dept: MEDICAL GROUP | Facility: PHYSICIAN GROUP | Age: 65
End: 2025-02-18

## 2025-02-20 ENCOUNTER — HOSPITAL ENCOUNTER (OUTPATIENT)
Dept: LAB | Facility: MEDICAL CENTER | Age: 65
End: 2025-02-20
Attending: STUDENT IN AN ORGANIZED HEALTH CARE EDUCATION/TRAINING PROGRAM
Payer: COMMERCIAL

## 2025-02-20 DIAGNOSIS — M05.9 SEROPOSITIVE RHEUMATOID ARTHRITIS (HCC): ICD-10-CM

## 2025-02-20 DIAGNOSIS — D84.9 IMMUNOSUPPRESSED STATUS (HCC): ICD-10-CM

## 2025-02-20 LAB
BASOPHILS # BLD AUTO: 1.1 % (ref 0–1.8)
BASOPHILS # BLD: 0.09 K/UL (ref 0–0.12)
EOSINOPHIL # BLD AUTO: 0.39 K/UL (ref 0–0.51)
EOSINOPHIL NFR BLD: 4.9 % (ref 0–6.9)
ERYTHROCYTE [DISTWIDTH] IN BLOOD BY AUTOMATED COUNT: 44.1 FL (ref 35.9–50)
ERYTHROCYTE [SEDIMENTATION RATE] IN BLOOD BY WESTERGREN METHOD: 6 MM/HOUR (ref 0–25)
HCT VFR BLD AUTO: 45.3 % (ref 37–47)
HGB BLD-MCNC: 15 G/DL (ref 12–16)
IMM GRANULOCYTES # BLD AUTO: 0.01 K/UL (ref 0–0.11)
IMM GRANULOCYTES NFR BLD AUTO: 0.1 % (ref 0–0.9)
LYMPHOCYTES # BLD AUTO: 1.99 K/UL (ref 1–4.8)
LYMPHOCYTES NFR BLD: 25 % (ref 22–41)
MCH RBC QN AUTO: 30.9 PG (ref 27–33)
MCHC RBC AUTO-ENTMCNC: 33.1 G/DL (ref 32.2–35.5)
MCV RBC AUTO: 93.2 FL (ref 81.4–97.8)
MONOCYTES # BLD AUTO: 0.81 K/UL (ref 0–0.85)
MONOCYTES NFR BLD AUTO: 10.2 % (ref 0–13.4)
NEUTROPHILS # BLD AUTO: 4.67 K/UL (ref 1.82–7.42)
NEUTROPHILS NFR BLD: 58.7 % (ref 44–72)
NRBC # BLD AUTO: 0 K/UL
NRBC BLD-RTO: 0 /100 WBC (ref 0–0.2)
PLATELET # BLD AUTO: 242 K/UL (ref 164–446)
PMV BLD AUTO: 11.5 FL (ref 9–12.9)
RBC # BLD AUTO: 4.86 M/UL (ref 4.2–5.4)
WBC # BLD AUTO: 8 K/UL (ref 4.8–10.8)

## 2025-02-20 PROCEDURE — 36415 COLL VENOUS BLD VENIPUNCTURE: CPT

## 2025-02-20 PROCEDURE — 85652 RBC SED RATE AUTOMATED: CPT

## 2025-02-20 PROCEDURE — 80053 COMPREHEN METABOLIC PANEL: CPT

## 2025-02-20 PROCEDURE — 85025 COMPLETE CBC W/AUTO DIFF WBC: CPT

## 2025-02-20 PROCEDURE — 86140 C-REACTIVE PROTEIN: CPT

## 2025-02-21 LAB
ALBUMIN SERPL BCP-MCNC: 4 G/DL (ref 3.2–4.9)
ALBUMIN/GLOB SERPL: 1.4 G/DL
ALP SERPL-CCNC: 68 U/L (ref 30–99)
ALT SERPL-CCNC: 30 U/L (ref 2–50)
ANION GAP SERPL CALC-SCNC: 15 MMOL/L (ref 7–16)
AST SERPL-CCNC: 26 U/L (ref 12–45)
BILIRUB SERPL-MCNC: 0.3 MG/DL (ref 0.1–1.5)
BUN SERPL-MCNC: 13 MG/DL (ref 8–22)
CALCIUM ALBUM COR SERPL-MCNC: 9.2 MG/DL (ref 8.5–10.5)
CALCIUM SERPL-MCNC: 9.2 MG/DL (ref 8.5–10.5)
CHLORIDE SERPL-SCNC: 105 MMOL/L (ref 96–112)
CO2 SERPL-SCNC: 19 MMOL/L (ref 20–33)
CREAT SERPL-MCNC: 1.04 MG/DL (ref 0.5–1.4)
CRP SERPL HS-MCNC: <0.3 MG/DL (ref 0–0.75)
GFR SERPLBLD CREATININE-BSD FMLA CKD-EPI: 60 ML/MIN/1.73 M 2
GLOBULIN SER CALC-MCNC: 2.9 G/DL (ref 1.9–3.5)
GLUCOSE SERPL-MCNC: 99 MG/DL (ref 65–99)
POTASSIUM SERPL-SCNC: 4.4 MMOL/L (ref 3.6–5.5)
PROT SERPL-MCNC: 6.9 G/DL (ref 6–8.2)
SODIUM SERPL-SCNC: 139 MMOL/L (ref 135–145)

## 2025-02-26 ENCOUNTER — APPOINTMENT (OUTPATIENT)
Dept: RHEUMATOLOGY | Facility: MEDICAL CENTER | Age: 65
End: 2025-02-26
Payer: COMMERCIAL

## 2025-03-10 ASSESSMENT — RHEUMATOLOGY FOLLOW-UP QUESTIONNAIRE: CHIEF_COMPLAINT: FOLLOW UP

## 2025-03-12 ENCOUNTER — OFFICE VISIT (OUTPATIENT)
Dept: RHEUMATOLOGY | Facility: MEDICAL CENTER | Age: 65
End: 2025-03-12
Attending: STUDENT IN AN ORGANIZED HEALTH CARE EDUCATION/TRAINING PROGRAM
Payer: COMMERCIAL

## 2025-03-12 VITALS
DIASTOLIC BLOOD PRESSURE: 70 MMHG | WEIGHT: 152 LBS | OXYGEN SATURATION: 92 % | HEIGHT: 62 IN | TEMPERATURE: 97.8 F | RESPIRATION RATE: 14 BRPM | HEART RATE: 82 BPM | BODY MASS INDEX: 27.97 KG/M2 | SYSTOLIC BLOOD PRESSURE: 124 MMHG

## 2025-03-12 DIAGNOSIS — M05.9 SEROPOSITIVE RHEUMATOID ARTHRITIS (HCC): ICD-10-CM

## 2025-03-12 DIAGNOSIS — D84.9 IMMUNOSUPPRESSED STATUS (HCC): ICD-10-CM

## 2025-03-12 DIAGNOSIS — M15.9 OSTEOARTHRITIS INVOLVING MULTIPLE JOINTS ON BOTH SIDES OF BODY: ICD-10-CM

## 2025-03-12 DIAGNOSIS — R76.8 SEROLOGIC AUTOIMMUNITY: ICD-10-CM

## 2025-03-12 PROBLEM — F17.210 TOBACCO DEPENDENCE DUE TO CIGARETTES: Status: RESOLVED | Noted: 2021-08-02 | Resolved: 2025-03-12

## 2025-03-12 PROCEDURE — 3074F SYST BP LT 130 MM HG: CPT | Performed by: STUDENT IN AN ORGANIZED HEALTH CARE EDUCATION/TRAINING PROGRAM

## 2025-03-12 PROCEDURE — 3078F DIAST BP <80 MM HG: CPT | Performed by: STUDENT IN AN ORGANIZED HEALTH CARE EDUCATION/TRAINING PROGRAM

## 2025-03-12 PROCEDURE — 99212 OFFICE O/P EST SF 10 MIN: CPT | Performed by: STUDENT IN AN ORGANIZED HEALTH CARE EDUCATION/TRAINING PROGRAM

## 2025-03-12 PROCEDURE — 99214 OFFICE O/P EST MOD 30 MIN: CPT | Performed by: STUDENT IN AN ORGANIZED HEALTH CARE EDUCATION/TRAINING PROGRAM

## 2025-03-12 RX ORDER — NAPROXEN 250 MG/1
250 TABLET ORAL
COMMUNITY

## 2025-03-12 RX ORDER — LEFLUNOMIDE 10 MG/1
10 TABLET ORAL DAILY
Qty: 90 TABLET | Refills: 3 | Status: SHIPPED | OUTPATIENT
Start: 2025-03-12

## 2025-03-12 ASSESSMENT — FIBROSIS 4 INDEX: FIB4 SCORE: 1.24

## 2025-03-12 NOTE — PATIENT INSTRUCTIONS
[facility] AFTER [facility]    Below are important guidelines to help you navigate your health care needs and assist us in caring for you safely and effectively. We encourage you to carefully read and understand this information and adhere to them accordingly.    [employer] Messaging and Phone Calls:  Diagnosis and Treatment - For a detailed explanation of your condition and treatment plan from today's visit, refer to the visit note on [employer] via the following steps:  Log in to [employer] and click on “Visits” at the top.  Scroll down to “Past Visits” under Appointments.  Click on “View Notes” under the appropriate visit date.  Questions or Concerns - MyChart messaging is for non-urgent matters that do not require immediate attention and should be brief with no more than two questions or concerns. If you have multiple questions or concerns, we ask that you schedule an appointment to have them properly addressed.  Response to Messages - [employer] messages are addressed throughout the week depending on clinical availability, so we ask that you allow up to one week for a response.  Phone Calls and Voicemails - Phone calls and voicemail messages are reserved for time-sensitive matters that cannot wait to be addressed via [employer]. We ask that you refrain from calling the office multiple times or leaving multiple voicemails regarding the same issue as doing so may lead to delays in response time.  Urgent Issues - For urgent medical matters or medical emergencies that cannot wait, you are advised to go to your nearest Urgent Care or Emergency Department for immediate attention.    Laboratory Tests and Imaging Studies:  Future Lab and Imaging Orders - We ask that you get your lab tests and imaging studies done no later than one week before your follow-up visit unless instructed otherwise.  Results Communication - You may see some test results marked as “abnormal” that are not necessarily significant or concerning. If  there are significant abnormalities on your test results that warrant further action, you will be notified via MyChart or phone call, otherwise they will be addressed at your follow-up visit.    Prescriptions and Refill Requests:  General Prescriptions (e.g. prednisone, hydroxychloroquine, leflunomide, methotrexate, etc.) - These are sent to Retail Pharmacies, so all refill requests of these medications should be directed to your local pharmacy.  Specialty Prescriptions (e.g. Enbrel, Humira, Cosentyx, Xeljanz, etc.) - These are sent to Specialty Pharmacies, so all refill requests of these medications should be directed to your designated specialty pharmacy.  Infusion Prescriptions (e.g. Remicade, Simponi Aria, Rituxan, Saphnelo, etc.) - These are sent to Outpatient Infusion Centers, so all scheduling requests of these medications should be directed to your local infusion center.    Medication Risks and Adverse Effects:  Immunosuppressed Status - Steroids and antirheumatic drugs are immunosuppressants, so they increase the risk of infections and can have side effects on various organ systems in your body, though most of them are uncommon.  Potential Side Effects - Be sure to read the drug package inserts to learn about the potential side effects of your medications before you start taking them and take them exactly as prescribed unless instructed otherwise.  In Case of Side Effects - If you experience any significant side effects, stop taking the medication immediately and promptly notify the prescriber. Depending on the severity of the side effects, consider going to an Urgent Care or Emergency Department for immediate attention.    Immunizations and Health Screening:  Vaccinations - If you are on immunosuppressive therapy, it is important that you are up to date on age-appropriate immunizations, particularly shingles and pneumonia vaccines, which you can request from your primary care provider or from us at your  next appointment.  Screening Tests - It is also important that you are up to date on age-appropriate screening tests, such as pap smear, mammography, and colonoscopy, which you can request from your primary care provider.    Educational and Supportive Resources:  [facility] ([email or web address]-[email or web address]) - Visit our website to learn more about your condition and other rheumatic diseases, and gain access to many helpful resources for them.  Disposal of Old Medications (www.[place].gov/everyday-takeback-day) - Visit the Drug Enforcement Administration website to find a nearby location where you can properly dispose of old medications you no longer need.  Disposal of Used Belding (www.safeneedledisposal.org) - Visit the Safe Needle Disposal Organization website to find a nearby location where you can properly dispose of used needles from your injectable medications.

## 2025-03-12 NOTE — PROGRESS NOTES
[facility] RHEUMATOLOGY  [address], [address], [place] [address]  Phone: [phone] ? Fax: [phone]  [facility]/Health-Services/Rheumatology    FOLLOW-UP VISIT NOTE         Subjective     DATE OF SERVICE: 03/12/2025    PRIMARY CARE PROVIDER:  Hernandez Cisneros D.O.  [address] [facility] [address] [facility] [id number]    PATIENT IDENTIFICATION:  Diane Crowell  [address] [place] [id number]    YOB: 1960    MEDICAL RECORD NUMBER: [id number]         CHIEF COMPLAINT:   Chief Complaint   Patient presents with    Follow-Up     Seropositive rheumatoid arthritis (HCC)       RHEUMATOLOGIC HISTORY:  Diane Crowell is a 64 y.o. female with pertinent history notable for seropositive rheumatoid arthritis diagnosed in 3/2022, osteoarthritis of multiple joints (hands and feet), and Hashimoto's thyroiditis with hypothyroidism. She initially presented on 3/22/22, reported onset of polyarthralgia in 7/2021 preceded by hypersensitivity rash from weeding at her yard. Since then she had been treated with several one-week courses of steroids and was on prednisone 2.5 mg daily when evaluated. With each course of steroid she experienced improvement but flared soon after completing the treatment. In addition, she has been managing with meloxicam 7.5 mg daily which she had been taken for a long time. She reported ongoing pain/swelling in her hands, wrists, and feet, as well as pain in her lower back and hips, associated with morning stiffness lasting all day and improving with activity.     Pertinent treatments: Prednisone 30>20 mg tapers during flares (on/off 3/2022-present, effective), methotrexate 15-20 mg weekly (3/2022-11/2022, ineffective), Enbrel 50 mg SC weekly (1/2023-12/2023, stopped due to formation of anti-Etanercept antibody), leflunomide 20>10 mg daily (11/2022-4/2023, flared on Enbrel alone, so restarted 5/2023, due to disease quiescence dose decreased 3/12/25-present, effective), Humira>Hadlima 40 mg  SC every 2 weeks (ordered 11/22/23-present, effective).     Pertinent positive labs: Positive RF 80, anti-CCP 81, and JORGE 1:320 homogenous/cytoplasmic patterns with negative reflex panel (in 3/2022); positive anti-TPO 51.2 and anti-.8 (in 7/2021); positive anti-Etanercept antibody 540 ng/mL (in 7/2023).     Pertinent negative labs: Negative HLA-B27 (in 10/2022), vitamin D of 49<26 (in 12/2022), HBV, HCV, and QTB (in 1/2023), vitamin D (in 2/2024), anti-Adalimumab antibody (in 8/2024), CRP, ESR, CBC, eGFR, creatinine, and LFTs (in 2/2025).     Pertinent XR imaging: Hands (in 3/2022) with typical osteoarthritis of bilateral 1st carpometacarpal joint and distal interphalangeal joint. Feet (in 3/2022) with postoperative changes of the right 5th proximal phalanx in the left 1st metatarsal head, osteoarthritis of both 1st metatarsophalangeal joint, and right accessory navicular, anatomic variant.      INTERVAL HISTORY:  Reports interval history as noted on the questionnaire below or scanned under media tab.  [facility] Established Patient History Form    3/10/2025  1:55 PM PDT - Filed by Patient   MAIN REASON FOR VISIT Follow up   INTERVAL HISTORY OF ILLNESS   Date of worsening onset:    Preceding incident/ailment:    Describe/list your symptoms: No pain, doing well. Quit smoking 32 days ago.   Exacerbating factors:    Alleviating factors:    Helpful medications:    Ineffective medications:    Severity of pain (scale of 1-10):    Personal/emotional stressors:    Mateusz All The Areas Of Pain    REVIEW OF SYMPTOMS    General   Fevers    Chills    Night sweats    Malaise    Fatigue    Unintentional weight loss    Musculoskeletal   Joint pain    Morning stiffness duration    Morning stiffness characteristic    Joint swelling    Joint instability    Tendon pain    Muscle pain    Body aches    Dermatologic   Hair loss with bald spots    Hair shedding    Skin thickening    Skin plaques    Sunlight-induced skin rash     Cold-induced color changes (white, purple, red on rewarming)    Neurologic/Psychiatric   Weakness    Spasms    Tingling    Burning    Numbness    Insomnia    Anxiety    Depression    Head/Eyes   Headaches    Temple pain    Dizziness    Dry eyes    Eye pain    Eye redness    Blurry vision    Vision loss    Ears/Nose   Ear pain    Ringing in ears    Vertigo    Hearing loss    Nasal ulcers    Nosebleeds    Sinus pain    Nasal congestion    Snoring    Mouth/Throat   Oral ulcers    Bleeding gums    Dry mouth    Cavities    Sore throat    Sticking in throat    Difficulty speaking    Neck/Lymphatics   Thyroid pain    Thyroid swelling    Lymph node swelling    Cardiac/Respiratory   Chest pain with breathing    Dry cough    Cough with bloody phlegm    Shortness of breath    Fast heartbeats    Irregular heartbeats    Gastrointestinal   Nausea    Vomiting    Difficulty swallowing    Heartburn    Abdominal pain    Bloody stool    Mucus stool    Genitourinary   Pelvic pain    Genital ulcers    Abnormal discharge    Burning urination    Frothy urine    Blood in urine        REVIEW OF SYSTEMS:  Except as noted in the history above, relevant review of systems with emphasis on autoimmune rheumatic diseases was otherwise negative.      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) 03/22/2022    Seropositive rheumatoid arthritis ([facility]) 03/08/2022    Vitamin D deficiency 08/02/2021    Postmenopausal 08/02/2021    Hypothyroidism 08/02/2021       PAST MEDICAL HISTORY:  Past Medical History:   Diagnosis Date    Actinic keratoses 01/04/2024    Cellulitis of right upper extremity 01/26/2022    COPD on long-term inhaled steroid  therapy (HCC) 1/20/2025    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 PATIENT NEEDS TO HOLD NSAIDS FOR 5 DAYS PRIOR TO INJECTION;  Surgeon: Shantelle Winkler M.D.;  [facility];  Service: Pain Management       SOCIAL HISTORY:  Social History     Socioeconomic History    Marital status:      Spouse name: Not on file    Number of children: Not on file    Years of education: Not on file    Highest education level: Not on file   Occupational History    Not on file   Tobacco Use    Smoking status: Every Day     Current packs/day: 1.00     Average packs/day: 1 pack/day for 23.0 years (23.0 ttl pk-yrs)     Types: Cigarettes     Passive exposure: Current    Smokeless tobacco: Never   Vaping Use    Vaping status: Never Used   Substance and Sexual Activity    Alcohol use: Yes     Comment: occ    Drug use: Never    Sexual activity: Yes     Partners: Male     Birth control/protection: Post-Menopausal   Other Topics Concern    Not on file   Social History Narrative    Not on file     Social Drivers of Health     Financial Resource Strain: Not on file   Food Insecurity: Not on file   Transportation Needs: Not on file   Physical Activity: Not on file   Stress: Not on file   Social Connections: Not on file   Intimate Partner Violence: Not on file   Housing Stability: Not on file       FAMILY HISTORY:  Family History   Problem Relation Age of Onset    Other Father         ALS    Lung Disease Paternal Grandmother        MEDICATIONS:  Current Outpatient Medications   Medication Sig    naproxen (NAPROSYN) 250 MG Tab Take 250 mg by mouth.    leflunomide (ARAVA) 10 MG Tab  Take 1 Tablet by mouth every day.    HADLIMA PUSHTOUCH 40 MG/0.4ML Solution Auto-injector INJECT 1 PEN UNDER THE SKIN EVERY 14 DAYS    albuterol 108 (90 Base) MCG/ACT Aero Soln inhalation aerosol Inhale 2 Puffs every four hours as needed for Shortness of Breath.    benzonatate (TESSALON) 100 MG Cap Take 1 Capsule by mouth 3 times a day as needed for Cough.    budesonide-formoterol (SYMBICORT) 80-4.5 MCG/ACT Aerosol Inhale 2 Puffs 2 times a day.    levothyroxine (SYNTHROID) 112 MCG Tab Take 1 Tablet by mouth every morning on an empty stomach. Take 1 tablet by mouth every morning on an empty stomach    buPROPion SR (WELLBUTRIN-SR) 150 MG TABLET SR 12 HR sustained-release tablet Take 1 tab by oral route per day for the first 3 days.  After 3 days, increase to 1 tab twice a day with at least 8 hours between doses.  Start this medication 1 to 2 weeks prior to your smoking quit date.  Continue this medication for 12 weeks.    promethazine-dextromethorphan (PROMETHAZINE-DM) 6.25-15 MG/5ML syrup Take 5 mL by mouth 4 times a day as needed for Cough.    estradiol-norethindrone (ACTIVELLA) 1-0.5 MG per tablet Take 1 Tablet by mouth every day.    VITAMIN D PO Take  by mouth.    Calcium Carbonate (CALCIUM 500 PO) Take  by mouth.       ALLERGIES:   Allergies   Allergen Reactions    Codeine     Pcn [Penicillins]        IMMUNIZATIONS:  Immunization History   Administered Date(s) Administered    Influenza Seasonal Injectable - Historical Data 12/18/2012    Influenza Vac Subunit Quad Inj (Pf) 10/15/2018    Influenza Vaccine Quad Inj (Pf) 10/07/2017, 10/18/2019, 10/15/2022, 12/01/2023    Influenza Vaccine Quad Recombinant 10/06/2020, 11/18/2021    Influenza, recombinant, trivalent, PF 12/05/2024    Zoster Vaccine Live (ZVL) (Zostavax) - HISTORICAL DATA 09/20/2016    Zoster Vaccine Recombinant (RZV) (SHINGRIX) 10/15/2022, 01/26/2023            Objective     Vital Signs: /70   Pulse 82   Temp 36.6 °C (97.8 °F) (Temporal)   Resp  "14    1.575 m (5' 2\")   Wt 68.9 kg (152 lb)   SpO2 92% Body mass index is 27.8 kg/m².    General: Appears well and comfortable  Eyes: No scleral or conjunctival lesions  ENT: No apparent oral, nasal, or ear lesions  Head/Neck: No apparent scalp or neck lesions  Cardiovascular: Regular rate and rhythm  Respiratory: Breathing quiet and unlabored  Gastrointestinal: No apparent organomegaly or abdominal masses  Integumentary: No significant cutaneous lesions or dyspigmentation  Musculoskeletal: No significant joint tenderness, swelling, warmth, erythema, or overt synovitis; no significant restriction in range of motion of joints examined  Neurologic: No focal sensory or motor deficits  Psychiatric: Mood and affect appropriate      LABORATORY RESULTS REVIEWED AND INTERPRETED:  Lab Results   Component Value Date/Time    CREACTPROT <0.30 02/20/2025 01:50 PM    SEDRATEWES 6 02/20/2025 01:50 PM     Lab Results   Component Value Date/Time    RHEUMFACTN 80 (H) 03/08/2022 02:32 PM    CCPANTIBODY 81 (H) 03/23/2022 09:46 AM    HPMN43KLKS Negative 10/26/2022 03:55 PM     Lab Results   Component Value Date/Time    ANTINUCAB Detected (A) 03/23/2022 09:46 AM     Lab Results   Component Value Date/Time    ANTIDNADS 15 03/23/2022 09:46 AM    SMITHAB 3 03/23/2022 09:46 AM    RNPAB 7 03/23/2022 09:46 AM    CSJUQPM80 9 03/23/2022 09:46 AM    SSA60 3 03/23/2022 09:46 AM    SSA52 1 03/23/2022 09:46 AM    ANTISSBSJ 0 03/23/2022 09:46 AM    JO1AB 1 03/23/2022 09:46 AM     Lab Results   Component Value Date/Time    MICROSOMALA 51.2 (H) 07/24/2021 09:09 AM    ANTITHYROGL 286.8 (H) 07/24/2021 09:09 AM    TSHULTRASEN 0.080 (L) 02/13/2024 09:20 AM    FREET4 1.69 02/13/2024 09:20 AM     Lab Results   Component Value Date/Time    25HYDROXY 47 02/13/2024 09:20 AM     Lab Results   Component Value Date/Time    WBC 8.0 02/20/2025 01:50 PM    RBC 4.86 02/20/2025 01:50 PM    HEMOGLOBIN 15.0 02/20/2025 01:50 PM    HEMATOCRIT 45.3 02/20/2025 01:50 PM "    MCV 93.2 02/20/2025 01:50 PM    MCH 30.9 02/20/2025 01:50 PM    MCHC 33.1 02/20/2025 01:50 PM    RDW 44.1 02/20/2025 01:50 PM    PLATELETCT 242 02/20/2025 01:50 PM    MPV 11.5 02/20/2025 01:50 PM    NEUTS 4.67 02/20/2025 01:50 PM    LYMPHOCYTES 25.00 02/20/2025 01:50 PM    MONOCYTES 10.20 02/20/2025 01:50 PM    EOSINOPHILS 4.90 02/20/2025 01:50 PM    BASOPHILS 1.10 02/20/2025 01:50 PM     Lab Results   Component Value Date/Time    ASTSGOT 26 02/20/2025 01:50 PM    ALTSGPT 30 02/20/2025 01:50 PM    ALKPHOSPHAT 68 02/20/2025 01:50 PM    TBILIRUBIN 0.3 02/20/2025 01:50 PM    TOTPROTEIN 6.9 02/20/2025 01:50 PM    ALBUMIN 4.0 02/20/2025 01:50 PM     Lab Results   Component Value Date/Time    SODIUM 139 02/20/2025 01:50 PM    POTASSIUM 4.4 02/20/2025 01:50 PM    CHLORIDE 105 02/20/2025 01:50 PM    CO2 19 (L) 02/20/2025 01:50 PM    GLUCOSE 99 02/20/2025 01:50 PM    BUN 13 02/20/2025 01:50 PM    CREATININE 1.04 02/20/2025 01:50 PM    CALCIUM 9.2 02/20/2025 01:50 PM     Lab Results   Component Value Date/Time    HEPBSAG Non-Reactive 01/19/2023 02:50 PM    HEPCAB Non-Reactive 01/19/2023 02:50 PM     Lab Results   Component Value Date/Time    CHOLSTRLTOT 173 02/13/2024 09:20 AM    LDL 97 02/13/2024 09:20 AM    HDL 62 02/13/2024 09:20 AM    TRIGLYCERIDE 68 02/13/2024 09:20 AM       RADIOLOGY RESULTS REVIEWED AND INTERPRETED:  Results for orders placed in visit on 03/23/22    DX-JOINT SURVEY-FEET SINGLE VIEW    Impression  1.  No evidence for inflammatory arthritis    2.  Postoperative changes of the right 5th proximal phalanx in the left 1st metatarsal head    3.  osteoarthritis of both 1st metatarsophalangeal joint    4.  Right accessory navicular, anatomic variant    Results for orders placed in visit on 03/23/22    DX-JOINT SURVEY-HANDS SINGLE VIEW    Impression  1.  No evidence for erosive arthritis    2.  Typical osteoarthritis of bilateral 1st carpometacarpal joint and distal interphalangeal joint    Results for orders  placed during the hospital encounter of 02/06/24    DS-BONE DENSITY STUDY (DEXA)    Impression  According to the World Health Organization classification, bone mineral density of this patient is normal for the lumbar spine and osteopenic for the left femur.    10-year Probability of Fracture:  Major Osteoporotic     17.9%  Hip     3.7%  Population      [place] ()    Based on left femur neck BMD      All relevant laboratory and imaging results reported on this note were reviewed and interpreted by me.         Assessment & Plan     Diane Crowell is a 64 y.o. female with history and physical as noted above whose presentation merits the following clinical impressions and recommendations:    1. Seropositive rheumatoid arthritis (HCC)  Clinically and serologically appears to be quiescent with no significant evidence of disease activity on the current regimen of leflunomide and Hadlima, so reasonable to de-escalate her immunosuppression by decreasing the dose of leflunomide from 20 mg to 10 mg. However, given the potential for smoldering disease activity with limited clinical manifestations, need to occasionally reassess markers of disease activity and risk stratification to gauge overall trajectory.  - CRP QUANTITIVE (NON-CARDIAC); Future  - Sed Rate; Future  - leflunomide (ARAVA) 10 MG Tab; Take 1 Tablet by mouth every day.  Dispense: 90 Tablet; Refill: 3  - Continue Hadlima 40 mg SC every 2 weeks     2. Osteoarthritis involving multiple joints (hands and feet)  Significant etiology of biomechanical arthralgia which does not seem to be much of an issue at this time but can be managed supportively.  - Tylenol Arthritis and Voltaren 1% gel with or without oral NSAIDs as needed  - Consider intra-articular steroid injection if that becomes necessary    3. 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 However, the possibility of preclinical evolving disease cannot be entirely excluded, though considered relatively low risk given she is on DMARD therapy with methotrexate.  - Consider repeat testing depending on clinical trajectory    4. Immunosuppressed status (HCC)  High risk immunosuppressant use requiring routine monitoring labs prior to every visit to assess for possible side effects including but not limited to myelotoxicity, hepatotoxicity, and opportunistic infections.  - CBC WITH DIFFERENTIAL; Future  - Comp Metabolic Panel; Future  - Need to ascertain age-appropriate vaccines in addition to the ones listed above under immunizations      The above assessment and plan were discussed with the patient who acknowledged understanding of the plan.    FOLLOW-UP: Return in about 9 months (around 12/12/2025) for Short.         Thank you for the opportunity to participate in the care of Diane Crowell.    Josr Mayer MD, MS, FACR  Rheumatologist, [facility], [facility]   of Clinical Medicine, Department of Internal Medicine  [facility] [employer]

## 2025-05-19 ENCOUNTER — HOSPITAL ENCOUNTER (OUTPATIENT)
Dept: LAB | Facility: MEDICAL CENTER | Age: 65
End: 2025-05-19
Payer: COMMERCIAL

## 2025-05-19 ENCOUNTER — OFFICE VISIT (OUTPATIENT)
Dept: MEDICAL GROUP | Facility: PHYSICIAN GROUP | Age: 65
End: 2025-05-19
Payer: COMMERCIAL

## 2025-05-19 VITALS
WEIGHT: 154 LBS | SYSTOLIC BLOOD PRESSURE: 128 MMHG | HEART RATE: 73 BPM | DIASTOLIC BLOOD PRESSURE: 86 MMHG | RESPIRATION RATE: 14 BRPM | TEMPERATURE: 97.2 F | OXYGEN SATURATION: 97 % | HEIGHT: 62 IN | BODY MASS INDEX: 28.34 KG/M2

## 2025-05-19 DIAGNOSIS — Z23 ENCOUNTER FOR IMMUNIZATION: Primary | ICD-10-CM

## 2025-05-19 DIAGNOSIS — E55.9 VITAMIN D DEFICIENCY: ICD-10-CM

## 2025-05-19 DIAGNOSIS — E03.9 HYPOTHYROIDISM, UNSPECIFIED TYPE: ICD-10-CM

## 2025-05-19 LAB
25(OH)D3 SERPL-MCNC: 84 NG/ML (ref 30–100)
BASOPHILS # BLD AUTO: 1.2 % (ref 0–1.8)
BASOPHILS # BLD: 0.11 K/UL (ref 0–0.12)
CHOLEST SERPL-MCNC: 204 MG/DL (ref 100–199)
EOSINOPHIL # BLD AUTO: 0.71 K/UL (ref 0–0.51)
EOSINOPHIL NFR BLD: 8 % (ref 0–6.9)
ERYTHROCYTE [DISTWIDTH] IN BLOOD BY AUTOMATED COUNT: 46.5 FL (ref 35.9–50)
FASTING STATUS PATIENT QL REPORTED: NORMAL
HCT VFR BLD AUTO: 47.1 % (ref 37–47)
HDLC SERPL-MCNC: 73 MG/DL
HGB BLD-MCNC: 14.9 G/DL (ref 12–16)
IMM GRANULOCYTES # BLD AUTO: 0.03 K/UL (ref 0–0.11)
IMM GRANULOCYTES NFR BLD AUTO: 0.3 % (ref 0–0.9)
LDLC SERPL CALC-MCNC: 114 MG/DL
LYMPHOCYTES # BLD AUTO: 1.97 K/UL (ref 1–4.8)
LYMPHOCYTES NFR BLD: 22.1 % (ref 22–41)
MCH RBC QN AUTO: 30.3 PG (ref 27–33)
MCHC RBC AUTO-ENTMCNC: 31.6 G/DL (ref 32.2–35.5)
MCV RBC AUTO: 95.9 FL (ref 81.4–97.8)
MONOCYTES # BLD AUTO: 0.86 K/UL (ref 0–0.85)
MONOCYTES NFR BLD AUTO: 9.6 % (ref 0–13.4)
NEUTROPHILS # BLD AUTO: 5.25 K/UL (ref 1.82–7.42)
NEUTROPHILS NFR BLD: 58.8 % (ref 44–72)
NRBC # BLD AUTO: 0 K/UL
NRBC BLD-RTO: 0 /100 WBC (ref 0–0.2)
PLATELET # BLD AUTO: 254 K/UL (ref 164–446)
PMV BLD AUTO: 11.3 FL (ref 9–12.9)
RBC # BLD AUTO: 4.91 M/UL (ref 4.2–5.4)
T4 FREE SERPL-MCNC: 1.7 NG/DL (ref 0.93–1.7)
TRIGL SERPL-MCNC: 85 MG/DL (ref 0–149)
TSH SERPL DL<=0.005 MIU/L-ACNC: 0.03 UIU/ML (ref 0.38–5.33)
WBC # BLD AUTO: 8.9 K/UL (ref 4.8–10.8)

## 2025-05-19 PROCEDURE — 3074F SYST BP LT 130 MM HG: CPT

## 2025-05-19 PROCEDURE — 90715 TDAP VACCINE 7 YRS/> IM: CPT

## 2025-05-19 PROCEDURE — 36415 COLL VENOUS BLD VENIPUNCTURE: CPT

## 2025-05-19 PROCEDURE — 99214 OFFICE O/P EST MOD 30 MIN: CPT | Mod: 25

## 2025-05-19 PROCEDURE — 90471 IMMUNIZATION ADMIN: CPT

## 2025-05-19 PROCEDURE — 84439 ASSAY OF FREE THYROXINE: CPT

## 2025-05-19 PROCEDURE — 3079F DIAST BP 80-89 MM HG: CPT

## 2025-05-19 PROCEDURE — 84443 ASSAY THYROID STIM HORMONE: CPT

## 2025-05-19 PROCEDURE — 82306 VITAMIN D 25 HYDROXY: CPT

## 2025-05-19 PROCEDURE — 85025 COMPLETE CBC W/AUTO DIFF WBC: CPT

## 2025-05-19 PROCEDURE — 80061 LIPID PANEL: CPT

## 2025-05-19 RX ORDER — MONTELUKAST SODIUM 4 MG/1
4 TABLET, CHEWABLE ORAL NIGHTLY
Qty: 30 TABLET | Refills: 1 | Status: SHIPPED | OUTPATIENT
Start: 2025-05-19

## 2025-05-19 ASSESSMENT — FIBROSIS 4 INDEX: FIB4 SCORE: 1.24

## 2025-05-19 NOTE — PROGRESS NOTES
Verbal consent was acquired by the patient to use [employer] ambient listening note generation during this visit     Subjective:     HPI:   History of Present Illness  The patient presents for evaluation of smoking cessation, cough, and skin spots.    Smoking Cessation  She has successfully abstained from smoking for a duration of 3 months and 1 week, attributing this achievement to the use of bupropion. Her , a retired , has also been on the same medication for the past 6 months, which has aided in his smoking cessation as well. She continues to take bupropion and reports that she has not yet undergone the lung cancer screening but plans to do so.  - Onset: Smoking cessation began 3 months and 1 week ago.  - Duration: Abstinent for 3 months and 1 week.  - Alleviating Factors: Use of bupropion.    Persistent Cough  She continues to experience a persistent cough and expectoration, even after a period of over 3 months. She expresses interest in trying Singulair for symptom management.  - Onset: Persistent for over 3 months.  - Duration: Ongoing for over 3 months.  - Character: Persistent cough with expectoration.  - Alleviating Factors: Interested in trying Singulair for symptom management.    Skin Spots  She has identified several small spots on her skin, with one particular spot on her face causing significant discomfort due to its soreness and pain. She also notes the presence of similar spots on her shoulders and legs. She recalls a previous instance where a non-primary care physician had successfully treated similar spots through cryotherapy.  - Location: Face, shoulders, and legs.  - Character: Small spots, with one on the face causing soreness and pain.  - Alleviating Factors: Cryotherapy was effective in treating similar spots previously.    Supplemental Information  She is due for her tetanus vaccine. She will text her rheumatologist to make sure that the pneumococcal vaccine will not interfere  "with her RA medications.    She has rheumatoid arthritis. She had a hepatitis C screen 2 years ago because of the medication for the RA.    SOCIAL HISTORY  The patient has quit smoking for 3 months and 1 week.    FAMILY HISTORY  Her mother has full-on dementia.    Health Maintenance: Completed    Objective:     Exam:  /86   Pulse 73   Temp 36.2 °C (97.2 °F) (Temporal)   Resp 14   Ht 1.575 m (5' 2\")   Wt 69.9 kg (154 lb)   SpO2 97%   BMI 28.17 kg/m²  Body mass index is 28.17 kg/m².    Physical Exam  Constitutional:       General: She is not in acute distress.     Appearance: Normal appearance. She is not ill-appearing.   Eyes:      Conjunctiva/sclera: Conjunctivae normal.   Cardiovascular:      Rate and Rhythm: Normal rate and regular rhythm.      Heart sounds: No murmur heard.  Pulmonary:      Effort: Pulmonary effort is normal. No respiratory distress.      Breath sounds: Wheezing present. No rhonchi.   Skin:     General: Skin is warm and dry.      Capillary Refill: Capillary refill takes less than 2 seconds.   Neurological:      General: No focal deficit present.      Mental Status: She is alert and oriented to person, place, and time.   Psychiatric:         Mood and Affect: Mood normal.             Results      Assessment & Plan:     1. Encounter for immunization  Tdap Vaccine =>8YO IM          Assessment & Plan  1. Smoking cessation: Stable.  - Successfully abstained from smoking for over 3 months with the aid of bupropion.  - Continues to take bupropion as prescribed.  - Discussed the positive response to the medication and the importance of ongoing adherence.  - Will maintain the current regimen of bupropion.    2. Cough: Persistent.  - Slight wheezing detected upon physical examination.  - Discussed the potential benefit of Singulair (montelukast) for symptom management.  - Prescription for Singulair issued, with instructions to take it nightly for 60 days to assess effectiveness.    3. " Seborrheic keratosis.  - Multiple seborrheic keratoses identified on the face and other areas.  - Plan to perform cryotherapy on the identified lesions during the next visit.  - Appointment to be scheduled for cryotherapy.    4. Health maintenance.  - Tetanus vaccine is due and will be administered today.  - Pneumococcal vaccine is also due; it will be ordered for administration at a later date pending confirmation with the RA doctor.  - Laboratory tests will be conducted today to review and follow up on results.    Follow-up  - Schedule follow-up appointment in a month or two to review the effectiveness of Singulair and lab results.      Return in about 1 month (around 6/19/2025) for f/u med check an lab review and for cryotherapy of SKs.    Please note that this dictation was created using voice recognition software. I have made every reasonable attempt to correct obvious errors, but I expect that there are errors of grammar and possibly content that I did not discover before finalizing the note.

## 2025-06-18 ENCOUNTER — TELEPHONE (OUTPATIENT)
Dept: HEALTH INFORMATION MANAGEMENT | Facility: OTHER | Age: 65
End: 2025-06-18
Payer: COMMERCIAL

## 2025-06-23 ENCOUNTER — PATIENT MESSAGE (OUTPATIENT)
Dept: SCHEDULING | Facility: IMAGING CENTER | Age: 65
End: 2025-06-23
Payer: COMMERCIAL

## 2025-07-08 ENCOUNTER — OFFICE VISIT (OUTPATIENT)
Dept: MEDICAL GROUP | Facility: PHYSICIAN GROUP | Age: 65
End: 2025-07-08
Payer: COMMERCIAL

## 2025-07-08 VITALS
TEMPERATURE: 97.9 F | RESPIRATION RATE: 14 BRPM | SYSTOLIC BLOOD PRESSURE: 126 MMHG | BODY MASS INDEX: 28.52 KG/M2 | OXYGEN SATURATION: 91 % | HEART RATE: 80 BPM | HEIGHT: 62 IN | WEIGHT: 155 LBS | DIASTOLIC BLOOD PRESSURE: 84 MMHG

## 2025-07-08 DIAGNOSIS — L82.1 KERATOSIS SEBORRHEICA: Primary | ICD-10-CM

## 2025-07-08 PROBLEM — M81.0 OSTEOPOROSIS: Status: ACTIVE | Noted: 2024-07-01

## 2025-07-08 PROCEDURE — 99213 OFFICE O/P EST LOW 20 MIN: CPT | Mod: 25

## 2025-07-08 PROCEDURE — 17000 DESTRUCT PREMALG LESION: CPT

## 2025-07-08 PROCEDURE — 3079F DIAST BP 80-89 MM HG: CPT

## 2025-07-08 PROCEDURE — 17003 DESTRUCT PREMALG LES 2-14: CPT

## 2025-07-08 PROCEDURE — 3074F SYST BP LT 130 MM HG: CPT

## 2025-07-08 RX ORDER — MONTELUKAST SODIUM 10 MG/1
10 TABLET ORAL DAILY
Qty: 90 TABLET | Refills: 1 | Status: SHIPPED | OUTPATIENT
Start: 2025-07-08

## 2025-07-08 ASSESSMENT — FIBROSIS 4 INDEX: FIB4 SCORE: 1.18

## 2025-07-08 NOTE — PROGRESS NOTES
Verbal consent was acquired by the patient to use [employer] ambient listening note generation during this visit     Subjective:     HPI:   History of Present Illness  The patient presents for a medication check and cryotherapy.    Smoking Cessation and Coughing  She has been using bupropion to aid in smoking cessation and has not smoked since 02/10/2025. However, she reports an increase in coughing since quitting smoking. She is currently on Singulair 4 mg at night for her cough and respiratory symptoms, but it does not seem to be effective. Her rheumatologist suspects that her rheumatoid arthritis may have originated in the lungs, which could explain the persistent cough.  - Onset: Increase in coughing since quitting smoking on 02/10/2025.  - Location: Lungs.  - Character: Persistent cough.  - Alleviating/Aggravating Factors: Singulair 4 mg at night, but not effective.  - Severity: Persistent cough despite medication.    Seborrheic Keratosis and Skin Lesions  She has been diagnosed with seborrheic keratosis and has several skin lesions, some of which are painful. She is particularly concerned about a lesion on her face due to its visibility and the potential risk of skin cancer from her RA medications. She has undergone cryotherapy in the past.  - Onset: Not specified.  - Location: Various skin lesions, particularly concerned about a lesion on her face.  - Character: Painful skin lesions.  - Alleviating/Aggravating Factors: Cryotherapy in the past.  - Severity: Concern about visibility and potential risk of skin cancer.    Rheumatoid Arthritis and Thyroid Condition  She is on leflunomide for her rheumatoid arthritis and levothyroxine for her thyroid condition, which was recently increased to 150 mcg due to low thyroid readings.  - Alleviating/Aggravating Factors: Leflunomide for rheumatoid arthritis; levothyroxine increased to 150 mcg for thyroid condition.  - Timing: Recent increase in levothyroxine dosage.  -  "Severity: Low thyroid readings necessitating increased dosage.    SOCIAL HISTORY  She has not had a cigarette since 02/10/2025.      Objective:     Exam:  /84   Pulse 80   Temp 36.6 °C (97.9 °F) (Temporal)   Resp 14   Ht 1.575 m (5' 2\")   Wt 70.3 kg (155 lb)   SpO2 91%   BMI 28.35 kg/m²  Body mass index is 28.35 kg/m².    Physical Exam  Constitutional:       General: She is not in acute distress.     Appearance: Normal appearance. She is not ill-appearing.   Eyes:      Conjunctiva/sclera: Conjunctivae normal.   Cardiovascular:      Rate and Rhythm: Normal rate and regular rhythm.      Heart sounds: No murmur heard.  Pulmonary:      Effort: Pulmonary effort is normal. No respiratory distress.      Breath sounds: Normal breath sounds. No wheezing or rhonchi.   Skin:     General: Skin is warm and dry.      Capillary Refill: Capillary refill takes less than 2 seconds.   Neurological:      General: No focal deficit present.      Mental Status: She is alert and oriented to person, place, and time.   Psychiatric:         Mood and Affect: Mood normal.             Results      Assessment & Plan:     1. Keratosis seborrheica  Consent for AMB Surgery/Procedure          Assessment & Plan  1. Cough: Chronic.  - Current dose of Singulair (montelukast) 4 mg at night has not been effective.  - Increase dosage of Singulair.  - Take medication in the morning if more convenient.    2. Seborrheic keratosis.  - Cryotherapy performed on six lesions.  - Risks of bleeding, damage to surrounding tissue, and infection discussed; consent form signed.  - Repeat treatments might be necessary, especially for the lesion on the face.    3. Rheumatoid arthritis.  - Respiratory symptoms might be related to RA.  - Continue current RA medications.    4. Hypothyroidism.  - Did not  new prescription for levothyroxine 150 mcg.  - Contact pharmacy to refill and start updated dosage.  - Monitor thyroid function at next visit.    5. " Smoking cessation.  - Successfully quit smoking since 02/10/2025 with the help of bupropion.    6. Health maintenance.  - Due for pneumococcal vaccine.  - Consult RA doctor to ensure it does not interfere with RA treatment.  - Return for vaccine or receive it at a pharmacy.    Follow-up  - Follow-up visit scheduled in 3 months to assess effectiveness of increased Singulair dosage, evaluate need for additional cryotherapy, monitor thyroid function, review smoking cessation progress, and ensure vaccination status is updated.    Return in about 3 months (around 10/8/2025) for f/u Cryotherapy and cough med check.    Please note that this dictation was created using voice recognition software. I have made every reasonable attempt to correct obvious errors, but I expect that there are errors of grammar and possibly content that I did not discover before finalizing the note.

## 2025-07-08 NOTE — PROCEDURES
CRYOTHERAPY:  Discussed the benign nature of these lesions.     Written consent was obtained. Immediately prior to procedure, a time out was called to verify the correct patient, procedure, equipment, support staff and site/side marked as required. Pre-procedure pain reported as 0/10.     Location: 1x left shoulder, 2x left arm, 1x right arm, and 1x right shin  Lesion: 6    Cryotherapy performed using liquid nitrogen, freeze-thaw-freeze technique x 3.  Patient tolerated the procedure well.  Post-procedure was 0/10. Aftercare as well as potential for blistering was discussed.  I explained that the procedure may need to be repeated if there is not complete resolution.